# Patient Record
Sex: FEMALE | Race: WHITE | NOT HISPANIC OR LATINO | Employment: FULL TIME | URBAN - METROPOLITAN AREA
[De-identification: names, ages, dates, MRNs, and addresses within clinical notes are randomized per-mention and may not be internally consistent; named-entity substitution may affect disease eponyms.]

---

## 2017-01-06 ENCOUNTER — ROUTINE PRENATAL (OUTPATIENT)
Dept: OBSTETRICS AND GYNECOLOGY | Facility: CLINIC | Age: 27
End: 2017-01-06
Attending: OBSTETRICS & GYNECOLOGY
Payer: COMMERCIAL

## 2017-01-06 ENCOUNTER — LAB VISIT (OUTPATIENT)
Dept: LAB | Facility: OTHER | Age: 27
End: 2017-01-06
Attending: OBSTETRICS & GYNECOLOGY
Payer: COMMERCIAL

## 2017-01-06 VITALS
WEIGHT: 132.38 LBS | BODY MASS INDEX: 23.45 KG/M2 | DIASTOLIC BLOOD PRESSURE: 76 MMHG | SYSTOLIC BLOOD PRESSURE: 118 MMHG

## 2017-01-06 DIAGNOSIS — Z34.03 ENCOUNTER FOR SUPERVISION OF NORMAL FIRST PREGNANCY IN THIRD TRIMESTER: Primary | ICD-10-CM

## 2017-01-06 DIAGNOSIS — Z34.02 ENCOUNTER FOR SUPERVISION OF NORMAL FIRST PREGNANCY IN SECOND TRIMESTER: ICD-10-CM

## 2017-01-06 LAB
BASOPHILS # BLD AUTO: 0.02 K/UL
BASOPHILS NFR BLD: 0.2 %
DIFFERENTIAL METHOD: ABNORMAL
EOSINOPHIL # BLD AUTO: 0.1 K/UL
EOSINOPHIL NFR BLD: 0.5 %
ERYTHROCYTE [DISTWIDTH] IN BLOOD BY AUTOMATED COUNT: 13.3 %
HCT VFR BLD AUTO: 32.7 %
HGB BLD-MCNC: 11.2 G/DL
LYMPHOCYTES # BLD AUTO: 2.3 K/UL
LYMPHOCYTES NFR BLD: 20.9 %
MCH RBC QN AUTO: 31.5 PG
MCHC RBC AUTO-ENTMCNC: 34.3 %
MCV RBC AUTO: 92 FL
MONOCYTES # BLD AUTO: 0.9 K/UL
MONOCYTES NFR BLD: 7.8 %
NEUTROPHILS # BLD AUTO: 7.8 K/UL
NEUTROPHILS NFR BLD: 70.6 %
PLATELET # BLD AUTO: 213 K/UL
PMV BLD AUTO: 9.9 FL
RBC # BLD AUTO: 3.55 M/UL
WBC # BLD AUTO: 11.1 K/UL

## 2017-01-06 PROCEDURE — 99999 PR PBB SHADOW E&M-EST. PATIENT-LVL I: CPT | Mod: PBBFAC,,, | Performed by: OBSTETRICS & GYNECOLOGY

## 2017-01-06 PROCEDURE — 85025 COMPLETE CBC W/AUTO DIFF WBC: CPT

## 2017-01-06 PROCEDURE — 0502F SUBSEQUENT PRENATAL CARE: CPT | Mod: S$GLB,,, | Performed by: OBSTETRICS & GYNECOLOGY

## 2017-01-09 ENCOUNTER — PATIENT MESSAGE (OUTPATIENT)
Dept: OBSTETRICS AND GYNECOLOGY | Facility: CLINIC | Age: 27
End: 2017-01-09

## 2017-01-16 ENCOUNTER — TELEPHONE (OUTPATIENT)
Dept: OBSTETRICS AND GYNECOLOGY | Facility: CLINIC | Age: 27
End: 2017-01-16

## 2017-01-16 NOTE — TELEPHONE ENCOUNTER
Let pt know i cannot scan into her chart but I can email/fax or give it to her at her appt on Friday. Pt prefers email.

## 2017-01-16 NOTE — TELEPHONE ENCOUNTER
Pt would like to know if she can get an order for a breast pump scanned onto her my [facility] portal.

## 2017-01-20 ENCOUNTER — ROUTINE PRENATAL (OUTPATIENT)
Dept: OBSTETRICS AND GYNECOLOGY | Facility: CLINIC | Age: 27
End: 2017-01-20
Attending: OBSTETRICS & GYNECOLOGY
Payer: COMMERCIAL

## 2017-01-20 VITALS
SYSTOLIC BLOOD PRESSURE: 110 MMHG | BODY MASS INDEX: 24.21 KG/M2 | DIASTOLIC BLOOD PRESSURE: 70 MMHG | WEIGHT: 136.69 LBS

## 2017-01-20 DIAGNOSIS — Z34.03 ENCOUNTER FOR SUPERVISION OF NORMAL FIRST PREGNANCY IN THIRD TRIMESTER: ICD-10-CM

## 2017-01-20 DIAGNOSIS — Z23 NEED FOR TDAP VACCINATION: Primary | ICD-10-CM

## 2017-01-20 PROCEDURE — 90471 IMMUNIZATION ADMIN: CPT | Mod: S$GLB,,, | Performed by: OBSTETRICS & GYNECOLOGY

## 2017-01-20 PROCEDURE — 0502F SUBSEQUENT PRENATAL CARE: CPT | Mod: S$GLB,,, | Performed by: OBSTETRICS & GYNECOLOGY

## 2017-01-20 PROCEDURE — 90715 TDAP VACCINE 7 YRS/> IM: CPT | Mod: S$GLB,,, | Performed by: OBSTETRICS & GYNECOLOGY

## 2017-01-20 PROCEDURE — 99999 PR PBB SHADOW E&M-EST. PATIENT-LVL II: CPT | Mod: PBBFAC,,, | Performed by: OBSTETRICS & GYNECOLOGY

## 2017-01-20 NOTE — PROGRESS NOTES
Ordering Physician: Dr. Sky  Order Type: Verbal     During visit today patient received injection of Tdap to left deltoid. Patient tolerated well, no allergic reaction noted. Requested patient to remain 10 minutes after injection.     Pre-Pain Scale:None     Post Pain Scale:None

## 2017-01-20 NOTE — LETTER
January 20, 2017    Earlene Nassar  [address]  [place] [address]         [facility]  [address]  [address] [place] [address]  Phone: [phone]  Fax: [phone] January 20, 2017     Patient: Earlene Nassar   YOB: 1990   Date of Visit: 1/20/2017       To Whom It May Concern:    Please allow my patient, Earlene Nassar, to wear tennis shoes at work to prevent swelling during the remainder of her pregnancy.    If you have any questions or concerns, please don't hesitate to call.    Sincerely,        Martin Sky MD

## 2017-01-20 NOTE — MR AVS SNAPSHOT
[facility]  [address]  [address]  [facility] [address]  Phone: [phone]  Fax: [phone]                  Earlene Nassar   2017 2:45 PM   Routine Prenatal    Description:  Female : 1990   Provider:  Martin Sky MD   Department:  [facility] [place]           Reason for Visit     Routine Prenatal Visit           Diagnoses this Visit        Comments    Need for Tdap vaccination    -  Primary     Encounter for supervision of normal first pregnancy in third trimester                To Do List           Future Appointments        Provider Department Dept Phone    2017 3:30 PM NURSE SCHEDULE, [facility] [phone]    2/3/2017 3:15 PM Martin Syk MD [facility] [phone]    2017 3:15 PM Martin Sky MD [facility] [phone]      Goals (5 Years of Data)     None      Follow-Up and Disposition     Return in about 2 weeks (around 2/3/2017).      [facility]     [facility] Line -  Assistance  Registered nurses in the [facility] provide clinical advisement, health education, appointment booking, and other advisory services.  Call for this free service at [phone].             Medications           Message regarding Medications     Verify the changes and/or additions to your medication regime listed below are the same as discussed with your clinician today.  If any of these changes or additions are incorrect, please notify your healthcare provider.             Verify that the below list of medications is an accurate representation of the medications you are currently taking.  If none reported, the list may be blank. If incorrect, please contact your healthcare provider. Carry this list with you in case of emergency.           Current Medications     doxylamine-pyridoxine (DICLEGIS) 10-10 mg TbEC 1 tab PO q nightly for nausea. Add 1 tab PO in am in nausea  persists. Add additional 1 tab PO in afternoon if needed    prenatal #108-iron,carbonyl-FA 30-1 mg Tab Take by mouth once daily.           Clinical Reference Information           Prenatal Vitals     Enc. Date GA Prenatal Vitals Prenatal Pulse Pain Level Urine Albumin/Glucose Edema Presentation Dilation/Effacement/Station    1/20/17 31w0d 110/70 / 62 kg (136 lb 11 oz) 31 cm / 148 / Present   Negative / Negative None / None / None      1/6/17 29w0d 118/76 / 60.1 kg (132 lb 6.2 oz) 28 cm / 133 / Present   Negative / Negative None / None / None      12/2/16 24w0d 110/68 / 56.1 kg (123 lb 10.9 oz) 24 cm / 132 / Present   Negative / Negative None / None / None      11/4/16 20w0d 112/70 / 53.4 kg (117 lb 9.9 oz) 20 cm / 148 / Present   Negative / Negative None / None / None      10/6/16 15w6d 104/62 / 51 kg (112 lb 5.2 oz)  / 156 / Absent   Negative / Negative None / None / None      9/6/16 11w4d 116/78  /  / Absent   Negative / Negative None / None / None      8/19/16 7w6d 116/76 / 47.8 kg (105 lb 6.1 oz)             Vital Signs - Last Recorded  Most recent update: 1/20/2017  2:57 PM by Dee Heredia MA    BP Wt LMP BMI       110/70 62 kg (136 lb 11 oz) 06/17/2016 (Exact Date) 24.21 kg/m2       Allergies as of 1/20/2017     Codeine      Immunizations Administered on Date of Encounter - 1/20/2017     Name Date Dose VIS Date Route    TDAP  Incomplete 0.5 mL 2/24/2015 Intramuscular      Orders Placed During Today's Visit      Normal Orders This Visit    Tdap Vaccine (Adult)

## 2017-02-03 ENCOUNTER — ROUTINE PRENATAL (OUTPATIENT)
Dept: OBSTETRICS AND GYNECOLOGY | Facility: CLINIC | Age: 27
End: 2017-02-03
Attending: OBSTETRICS & GYNECOLOGY
Payer: COMMERCIAL

## 2017-02-03 VITALS
DIASTOLIC BLOOD PRESSURE: 76 MMHG | WEIGHT: 142.94 LBS | BODY MASS INDEX: 25.33 KG/M2 | SYSTOLIC BLOOD PRESSURE: 110 MMHG

## 2017-02-03 DIAGNOSIS — Z34.03 ENCOUNTER FOR SUPERVISION OF NORMAL FIRST PREGNANCY IN THIRD TRIMESTER: ICD-10-CM

## 2017-02-03 DIAGNOSIS — O47.03 THREATENED PREMATURE LABOR, THIRD TRIMESTER: Primary | ICD-10-CM

## 2017-02-03 PROCEDURE — 0502F SUBSEQUENT PRENATAL CARE: CPT | Mod: S$GLB,,, | Performed by: OBSTETRICS & GYNECOLOGY

## 2017-02-03 PROCEDURE — 99999 PR PBB SHADOW E&M-EST. PATIENT-LVL II: CPT | Mod: PBBFAC,,, | Performed by: OBSTETRICS & GYNECOLOGY

## 2017-02-03 PROCEDURE — 87086 URINE CULTURE/COLONY COUNT: CPT

## 2017-02-03 NOTE — LETTER
February 3, 2017    Earlene Nassar  [address]  [place] [address]         [facility]  [address]  [address] [place] [address]  Phone: [phone]  Fax: [phone] February 3, 2017     Patient: Earlene Nassar   YOB: 1990   Date of Visit: 2/3/2017       To Whom It May Concern:    It is my medical opinion that Earlene Nassar is unable to travel for work at this time due to complications in pregnancy. If you have any questions or concerns, please don't hesitate to call.    Sincerely,        Martin Sky MD

## 2017-02-03 NOTE — MR AVS SNAPSHOT
[facility]  [address]  [address]  [facility] [address]  Phone: [phone]  Fax: [phone]                  Earlene Nassar   2/3/2017 2:45 PM   Routine Prenatal    Description:  Female : 1990   Provider:  Martin Sky MD   Department:  [facility]           Reason for Visit     Routine Prenatal Visit           Diagnoses this Visit        Comments    Threatened premature labor, third trimester    -  Primary     Encounter for supervision of normal first pregnancy in third trimester                To Do List           Future Appointments        Provider Department Dept Phone    2017 1:00 PM Yas Pruitt NP [facility] [phone]    2017 3:15 PM Martin Sky MD [facility] [phone]    2017 9:15 AM Martin Sky MD [facility] [phone]    3/3/2017 3:45 PM Meli Fernandes MD [facility] [phone]      Goals (5 Years of Data)     None      Follow-Up and Disposition     Return in about 1 week (around 2/10/2017).    Follow-up and Disposition History      [facility]     [facility] On Call Nurse Care Line -  Assistance  Registered nurses in the [facility] On Call Center provide clinical advisement, health education, appointment booking, and other advisory services.  Call for this free service at [phone].             Medications           Message regarding Medications     Verify the changes and/or additions to your medication regime listed below are the same as discussed with your clinician today.  If any of these changes or additions are incorrect, please notify your healthcare provider.             Verify that the below list of medications is an accurate representation of the medications you are currently taking.  If none reported, the list may be blank. If incorrect, please contact your healthcare provider. Carry this list with you in case of emergency.           Current  Medications     doxylamine-pyridoxine (DICLEGIS) 10-10 mg TbEC 1 tab PO q nightly for nausea. Add 1 tab PO in am in nausea persists. Add additional 1 tab PO in afternoon if needed    prenatal #108-iron,carbonyl-FA 30-1 mg Tab Take by mouth once daily.           Clinical Reference Information           Prenatal Vitals     Enc. Date GA Prenatal Vitals Prenatal Pulse Pain Level Urine Albumin/Glucose Edema Presentation Dilation/Effacement/Station    2/3/17 33w0d 110/76 / 64.8 kg (142 lb 15.5 oz) 33 cm / 142 / Present   Negative / Negative None / None / None      1/20/17 31w0d 110/70 / 62 kg (136 lb 11 oz) 31 cm / 148 / Present   Negative / Negative None / None / None      1/6/17 29w0d 118/76 / 60.1 kg (132 lb 6.2 oz) 28 cm / 133 / Present   Negative / Negative None / None / None      12/2/16 24w0d 110/68 / 56.1 kg (123 lb 10.9 oz) 24 cm / 132 / Present   Negative / Negative None / None / None      11/4/16 20w0d 112/70 / 53.4 kg (117 lb 9.9 oz) 20 cm / 148 / Present   Negative / Negative None / None / None      10/6/16 15w6d 104/62 / 51 kg (112 lb 5.2 oz)  / 156 / Absent   Negative / Negative None / None / None      9/6/16 11w4d 116/78  /  / Absent   Negative / Negative None / None / None      8/19/16 7w6d 116/76 / 47.8 kg (105 lb 6.1 oz)             Your Vitals Were     BP Weight Last Period BMI       110/76 64.8 kg (142 lb 15.5 oz) 06/17/2016 (Exact Date) 25.33 kg/m2       Allergies as of 2/3/2017     Codeine      Immunizations Administered on Date of Encounter - 2/3/2017     None      Orders Placed During Today's Visit      Normal Orders This Visit    Urine culture       Language Assistance Services     ATTENTION: Language assistance services are available, free of charge. Please call [phone].      ATENCIÓN: Si habla orsarioañol, tiene a peres disposición servicios gratuitos de asistencia lingüística. Llame al [phone].     RAMA Ý: N?u b?n nói Ti?ng Vi?t, có các d?ch v? h? tr? ngôn ng? mi?n phí dành cho b?n. G?i  s? [phone].         [facility] complies with applicable Federal civil rights laws and does not discriminate on the basis of race, color, national origin, age, disability, or sex.

## 2017-02-03 NOTE — PROGRESS NOTES
Having upper back pain, worse with sitting better with standing. Drives on hour back and forth to work which does not help; neg CVAT recommend ortho consult and PT.  SVE 1-2/70/-2.  Recommend stopping traveling to [facility] for work,  Okay to work in [employer] at this time.  Come back early next week for repeat SVE and FFN and BMZ injection.

## 2017-02-03 NOTE — LETTER
February 3, 2017    Earlene Nassar  [address]  [place] [address]         [facility]  [address]  [address] [place] [address]  Phone: [phone]  Fax: [phone] February 3, 2017     Patient: Earlene Nassar   YOB: 1990   Date of Visit: 2/3/2017       To Whom It May Concern:    It is my medical opinion that Earlene Nassar is unable to travel to the [place] office for work at this time due to complications in pregnancy. If you have any questions or concerns, please don't hesitate to call.    Sincerely,        Martin Sky MD

## 2017-02-07 ENCOUNTER — ROUTINE PRENATAL (OUTPATIENT)
Dept: OBSTETRICS AND GYNECOLOGY | Facility: CLINIC | Age: 27
End: 2017-02-07
Payer: COMMERCIAL

## 2017-02-07 VITALS
BODY MASS INDEX: 25.03 KG/M2 | WEIGHT: 141.31 LBS | DIASTOLIC BLOOD PRESSURE: 82 MMHG | SYSTOLIC BLOOD PRESSURE: 120 MMHG

## 2017-02-07 DIAGNOSIS — Z3A.33 33 WEEKS GESTATION OF PREGNANCY: ICD-10-CM

## 2017-02-07 DIAGNOSIS — Z34.00 GRAVIDA 1, CURRENTLY PREGNANT: ICD-10-CM

## 2017-02-07 DIAGNOSIS — O26.893 BACK PAIN DURING PREGNANCY IN THIRD TRIMESTER: Primary | ICD-10-CM

## 2017-02-07 DIAGNOSIS — M54.9 BACK PAIN DURING PREGNANCY IN THIRD TRIMESTER: Primary | ICD-10-CM

## 2017-02-07 DIAGNOSIS — O47.03 THREATENED PRETERM LABOR, THIRD TRIMESTER: ICD-10-CM

## 2017-02-07 LAB — BACTERIA UR CULT: NO GROWTH

## 2017-02-07 PROCEDURE — 99999 PR PBB SHADOW E&M-EST. PATIENT-LVL II: CPT | Mod: PBBFAC,,, | Performed by: NURSE PRACTITIONER

## 2017-02-07 PROCEDURE — 82731 ASSAY OF FETAL FIBRONECTIN: CPT

## 2017-02-07 PROCEDURE — 0502F SUBSEQUENT PRENATAL CARE: CPT | Mod: S$GLB,,, | Performed by: NURSE PRACTITIONER

## 2017-02-07 NOTE — PROGRESS NOTES
Chief Complaint:  Obstetric Problem Visit    HPI: Earlene Nassar is a 26 y.o., , at 33w4d wks here for FU visit and FFN. At her last visit she reported bilateral upper back discomfort that was worse with sitting and relieved with standing. She was traveling long distances in the car at this time as well. She reports the upper back discomfort is more musculoskeletal related and not associated with contractions and she denies contractions, vaginal bleeding, leaking fluids, abnormal vaginal discharge,  or GI complaints. Fetal movement detected at this time.     Physical Exam:   FHT: 148  FH: 33  Cervix: 1    Plan:  1. Upper back pain and dilation-- exam with no change since previous visit. FFN today and pending results steroid injection. Discussed/Precautions given. Pt is Rh Positive. UA in office neg. No other reported symptoms, keep fu apt with dr. Sky and also discussed spotting can happen after cervical exam

## 2017-02-07 NOTE — PATIENT INSTRUCTIONS
FETAL KICK COUNTS  You are the best monitor for how well your baby is doing. The American Congress of Obstetricians and Gynecologists (ACOG) recommends that you time how long it takes you to feel 10 kicks, flutters, swishes, or rolls. Ideally, you want to feel at least 10 movements within 2 hours. You will likely feel 10 movements in less time than that. Please start counting your babys movements twice a day using the following guidelines:  Fetal Kick Counts:  1. Count in the morning to make sure baby moves 5 times within one hour  2. Count in the evening to make sure baby moves 5 times within on hour  NOTE: count movements of any kind: kicks, clutters, swishes, rolls  If at any time you feel the baby is not moving as much as he/she usually does please follow the below:  1. Have something to eat/drink  2. Lie down on your left side in a quiet room with you hand on uterus  3. Count the movements your baby makes in the next hour  4. If you are not able to feel 10 movements in the hour notify provider immediately  As baby grows and you get closer to your due date, the movements will change. With less room inside of your uterus your baby becomes less vigorous. Continue to monitor movements as outlined above.  Do Not compare your babys movements to other pregnant women or previous pregnancies. Each baby has an individual pattern of activity and development. R  Remember you know your jesus better than anyone else. If there is sudden changes in the movement of your baby, notify provider immediately.

## 2017-02-08 ENCOUNTER — TELEPHONE (OUTPATIENT)
Dept: OBSTETRICS AND GYNECOLOGY | Facility: CLINIC | Age: 27
End: 2017-02-08

## 2017-02-08 ENCOUNTER — PATIENT MESSAGE (OUTPATIENT)
Dept: OBSTETRICS AND GYNECOLOGY | Facility: CLINIC | Age: 27
End: 2017-02-08

## 2017-02-08 ENCOUNTER — CLINICAL SUPPORT (OUTPATIENT)
Dept: OBSTETRICS AND GYNECOLOGY | Facility: CLINIC | Age: 27
End: 2017-02-08
Payer: COMMERCIAL

## 2017-02-08 DIAGNOSIS — R87.89 POSITIVE FETAL FIBRONECTIN AT 22 WEEKS TO 34 WEEKS GESTATION: Primary | ICD-10-CM

## 2017-02-08 DIAGNOSIS — O09.899 POSITIVE FETAL FIBRONECTIN AT 22 WEEKS TO 34 WEEKS GESTATION: Primary | ICD-10-CM

## 2017-02-08 DIAGNOSIS — O47.03 THREATENED PRETERM LABOR, THIRD TRIMESTER: Primary | ICD-10-CM

## 2017-02-08 LAB — FIBRONECTIN FETAL SPEC QL: POSITIVE

## 2017-02-08 PROCEDURE — 96372 THER/PROPH/DIAG INJ SC/IM: CPT | Mod: S$GLB,,, | Performed by: NURSE PRACTITIONER

## 2017-02-08 PROCEDURE — 99999 PR PBB SHADOW E&M-EST. PATIENT-LVL I: CPT | Mod: PBBFAC,,,

## 2017-02-08 RX ORDER — BETAMETHASONE SODIUM PHOSPHATE AND BETAMETHASONE ACETATE 3; 3 MG/ML; MG/ML
12 INJECTION, SUSPENSION INTRA-ARTICULAR; INTRALESIONAL; INTRAMUSCULAR; SOFT TISSUE DAILY
Qty: 4 ML | Refills: 0 | Status: SHIPPED | OUTPATIENT
Start: 2017-02-08 | End: 2017-02-10

## 2017-02-08 RX ORDER — BETAMETHASONE SODIUM PHOSPHATE AND BETAMETHASONE ACETATE 3; 3 MG/ML; MG/ML
12 INJECTION, SUSPENSION INTRA-ARTICULAR; INTRALESIONAL; INTRAMUSCULAR; SOFT TISSUE EVERY 24 HOURS
Status: COMPLETED | OUTPATIENT
Start: 2017-02-08 | End: 2017-02-09

## 2017-02-08 RX ADMIN — BETAMETHASONE SODIUM PHOSPHATE AND BETAMETHASONE ACETATE 12 MG: 3; 3 INJECTION, SUSPENSION INTRA-ARTICULAR; INTRALESIONAL; INTRAMUSCULAR; SOFT TISSUE at 03:02

## 2017-02-08 NOTE — TELEPHONE ENCOUNTER
Discussed with Dr. Sky and patient about positive FFN. Education provided of reliability/predictability of FFN, precautions given and pelvic rest discussed. Pt to work in [place] office and start steroid. Orders entered and pt to come today for steroid injection

## 2017-02-08 NOTE — PROGRESS NOTES
Ordering Physician: Asya    Order Type: Verbal     During visit today patient received injection of celestone to right ventrogluteal. Patient tolerated well, no allergic reaction noted. Requested patient to remain 10 minutes after injection.     Pre-Pain Scale:None     Post Pain Scale:None

## 2017-02-09 ENCOUNTER — CLINICAL SUPPORT (OUTPATIENT)
Dept: OBSTETRICS AND GYNECOLOGY | Facility: CLINIC | Age: 27
End: 2017-02-09
Payer: COMMERCIAL

## 2017-02-09 DIAGNOSIS — O47.03 THREATENED PRETERM LABOR, THIRD TRIMESTER: Primary | ICD-10-CM

## 2017-02-09 PROCEDURE — 96372 THER/PROPH/DIAG INJ SC/IM: CPT | Mod: S$GLB,,, | Performed by: OBSTETRICS & GYNECOLOGY

## 2017-02-09 RX ADMIN — BETAMETHASONE SODIUM PHOSPHATE AND BETAMETHASONE ACETATE 12 MG: 3; 3 INJECTION, SUSPENSION INTRA-ARTICULAR; INTRALESIONAL; INTRAMUSCULAR; SOFT TISSUE at 03:02

## 2017-02-09 NOTE — PROGRESS NOTES
Ordering Physician: Dr. Sky    Order Type: Verbal     During visit today patient received injection of Betamethasone to left glut. Patient tolerated well, no allergic reaction noted. Requested patient to remain 10 minutes after injection.     Pre-Pain Scale:None     Post Pain Scale:None

## 2017-02-13 ENCOUNTER — TELEPHONE (OUTPATIENT)
Dept: OBSTETRICS AND GYNECOLOGY | Facility: CLINIC | Age: 27
End: 2017-02-13

## 2017-02-13 ENCOUNTER — ROUTINE PRENATAL (OUTPATIENT)
Dept: OBSTETRICS AND GYNECOLOGY | Facility: CLINIC | Age: 27
End: 2017-02-13
Payer: COMMERCIAL

## 2017-02-13 VITALS
BODY MASS INDEX: 24.86 KG/M2 | WEIGHT: 140.31 LBS | DIASTOLIC BLOOD PRESSURE: 90 MMHG | SYSTOLIC BLOOD PRESSURE: 120 MMHG

## 2017-02-13 DIAGNOSIS — R87.89 POSITIVE FETAL FIBRONECTIN AT 22 WEEKS TO 34 WEEKS GESTATION: ICD-10-CM

## 2017-02-13 DIAGNOSIS — Z34.00 GRAVIDA 1, CURRENTLY PREGNANT: ICD-10-CM

## 2017-02-13 DIAGNOSIS — Z3A.34 34 WEEKS GESTATION OF PREGNANCY: ICD-10-CM

## 2017-02-13 DIAGNOSIS — O28.8 NON-REACTIVE NST (NON-STRESS TEST): Primary | ICD-10-CM

## 2017-02-13 DIAGNOSIS — O09.899 POSITIVE FETAL FIBRONECTIN AT 22 WEEKS TO 34 WEEKS GESTATION: ICD-10-CM

## 2017-02-13 PROCEDURE — 99999 PR PBB SHADOW E&M-EST. PATIENT-LVL II: CPT | Mod: PBBFAC,,, | Performed by: NURSE PRACTITIONER

## 2017-02-13 PROCEDURE — 59025 FETAL NON-STRESS TEST: CPT | Mod: S$GLB,,, | Performed by: NURSE PRACTITIONER

## 2017-02-13 PROCEDURE — 0502F SUBSEQUENT PRENATAL CARE: CPT | Mod: S$GLB,,, | Performed by: NURSE PRACTITIONER

## 2017-02-13 NOTE — PROGRESS NOTES
Chief Complaint  NST Monitoring    HPI:  Pt is a 26 y.o.  at 34w3d gestation here for NST monitoring. Patient is currently getting NSTs for cramping. Was seen in office about 1.5 weeks ago with back pain and was dilated 1 at that visit. Seen on FU with reports of MS back pain, no changes in dilation, but FFN was positive. Since that time she has completed her steroids and she reports starting with pelivc pressure low in the pelvis last night around 8/9 oclock and feels she will have this pressure every hour. Denies VB/leaking fluids/GI/ complaints. She also reports she isn't sure if this is fetal movement in the pelvis. She doesn't describe as painful but mor of an hourly pressure that resolves in about 30 seconds.    Procdedure:  Indication: Pelvic pressure/cramping/+FFN  Baseline: 135  Variability: Moderate  Accelerations: present  Decelerations: absent  Tocometry: no contractions   Cervix: SVE with no changes in cervix since last visit with cervix 1.5 cm  Manual 118/76  Assessment:  1. NST finding: reactive. Reassurance provided today, case discussed with Dr. Mejia. Hydration and discussed real/vs false labor contracetion. Keep apt with Dr. Sky on Friday

## 2017-02-13 NOTE — TELEPHONE ENCOUNTER
34 3/7 week OB c/o cramping every hour and pressure since last night.  She spoke with the after hours nurse but wanted to come in to be checked.  Reassured her that it was normal, instructed her to increase PO hydration and rest.  Labor precautions given.  Scheduled with Yas today.

## 2017-02-17 ENCOUNTER — ROUTINE PRENATAL (OUTPATIENT)
Dept: OBSTETRICS AND GYNECOLOGY | Facility: CLINIC | Age: 27
End: 2017-02-17
Attending: OBSTETRICS & GYNECOLOGY
Payer: COMMERCIAL

## 2017-02-17 VITALS
BODY MASS INDEX: 25.19 KG/M2 | SYSTOLIC BLOOD PRESSURE: 118 MMHG | WEIGHT: 142.19 LBS | DIASTOLIC BLOOD PRESSURE: 80 MMHG

## 2017-02-17 DIAGNOSIS — Z36.85 ANTENATAL SCREENING FOR STREPTOCOCCUS B: Primary | ICD-10-CM

## 2017-02-17 DIAGNOSIS — Z34.03 ENCOUNTER FOR SUPERVISION OF NORMAL FIRST PREGNANCY IN THIRD TRIMESTER: ICD-10-CM

## 2017-02-17 PROCEDURE — 87081 CULTURE SCREEN ONLY: CPT

## 2017-02-17 PROCEDURE — 0502F SUBSEQUENT PRENATAL CARE: CPT | Mod: S$GLB,,, | Performed by: OBSTETRICS & GYNECOLOGY

## 2017-02-17 PROCEDURE — 99999 PR PBB SHADOW E&M-EST. PATIENT-LVL I: CPT | Mod: PBBFAC,,, | Performed by: OBSTETRICS & GYNECOLOGY

## 2017-02-17 NOTE — MR AVS SNAPSHOT
[facility]  [address]  [address]  [facility] [address]  Phone: [phone]  Fax: [phone]                  Earlene Nassar   2017 3:15 PM   Routine Prenatal    Description:  Female : 1990   Provider:  Martin Sky MD   Department:  [facility]           Diagnoses this Visit        Comments     screening for streptococcus B    -  Primary     Encounter for supervision of normal first pregnancy in third trimester                To Do List           Future Appointments        Provider Department Dept Phone    2017 9:15 AM Martin Sky MD [facility] [phone]    3/3/2017 3:45 PM Meli Fernandes MD [facility] [phone]    3/10/2017 3:30 PM Martin Sky MD [facility] [phone]      Goals (5 Years of Data)     None      Follow-Up and Disposition     Return in about 1 week (around 2017).    Follow-up and Disposition History      [facility]     [facility] -  Assistance  Registered nurses in the [facility] On Call Center provide clinical advisement, health education, appointment booking, and other advisory services.  Call for this free service at [phone].             Medications           Message regarding Medications     Verify the changes and/or additions to your medication regime listed below are the same as discussed with your clinician today.  If any of these changes or additions are incorrect, please notify your healthcare provider.        STOP taking these medications     doxylamine-pyridoxine (DICLEGIS) 10-10 mg TbEC 1 tab PO q nightly for nausea. Add 1 tab PO in am in nausea persists. Add additional 1 tab PO in afternoon if needed           Verify that the below list of medications is an accurate representation of the medications you are currently taking.  If none reported, the list may be blank. If incorrect, please contact your healthcare provider.  Carry this list with you in case of emergency.           Current Medications     prenatal #108-iron,carbonyl-FA 30-1 mg Tab Take by mouth once daily.           Clinical Reference Information           Prenatal Vitals     Enc. Date GA Prenatal Vitals Prenatal Pulse Pain Level Urine Albumin/Glucose Edema Presentation Dilation/Effacement/Station    2/17/17 35w0d 118/80 / 64.5 kg (142 lb 3.2 oz) 34 cm / 142 / Present   Negative / Negative None / None / None Vertex 4 / 90 / -2    2/13/17 34w3d 120/90 (A) / 63.6 kg (140 lb 5.2 oz)  /  / Present   Negative / Negative None / None / None  1    2/7/17 33w4d 120/82 / 64.1 kg (141 lb 5 oz) 33 cm / 148 / Present   Negative / Negative None / None / None  1    2/3/17 33w0d 110/76 / 64.8 kg (142 lb 15.5 oz) 33 cm / 142 / Present   Negative / Negative None / None / None      1/20/17 31w0d 110/70 / 62 kg (136 lb 11 oz) 31 cm / 148 / Present   Negative / Negative None / None / None      1/6/17 29w0d 118/76 / 60.1 kg (132 lb 6.2 oz) 28 cm / 133 / Present   Negative / Negative None / None / None      12/2/16 24w0d 110/68 / 56.1 kg (123 lb 10.9 oz) 24 cm / 132 / Present   Negative / Negative None / None / None      11/4/16 20w0d 112/70 / 53.4 kg (117 lb 9.9 oz) 20 cm / 148 / Present   Negative / Negative None / None / None      10/6/16 15w6d 104/62 / 51 kg (112 lb 5.2 oz)  / 156 / Absent   Negative / Negative None / None / None      9/6/16 11w4d 116/78  /  / Absent   Negative / Negative None / None / None      8/19/16 7w6d 116/76 / 47.8 kg (105 lb 6.1 oz)             Your Vitals Were     BP Weight Last Period BMI       118/80 64.5 kg (142 lb 3.2 oz) 06/17/2016 (Exact Date) 25.19 kg/m2       Allergies as of 2/17/2017     Codeine      Immunizations Administered on Date of Encounter - 2/17/2017     None      Orders Placed During Today's Visit      Normal Orders This Visit    Strep B Screen, Vaginal / Rectal       Language Assistance Services     ATTENTION: Language assistance services are  available, free of charge. Please call [phone].      ATENCIÓN: Si habla lizet, tiene a peres disposición servicios gratuitos de asistencia lingüística. Llame al [phone].     CHÚ Ý: N?u b?n nói Ti?ng Vi?t, có các d?ch v? h? tr? ngôn ng? mi?n phí dành cho b?n. G?i s? [phone].         [facility] complies with applicable Federal civil rights laws and does not discriminate on the basis of race, color, national origin, age, disability, or sex.

## 2017-02-18 ENCOUNTER — HOSPITAL ENCOUNTER (EMERGENCY)
Facility: OTHER | Age: 27
Discharge: HOME | End: 2017-02-18
Attending: OBSTETRICS & GYNECOLOGY
Payer: COMMERCIAL

## 2017-02-18 DIAGNOSIS — O47.03 THREATENED PRETERM LABOR, THIRD TRIMESTER: ICD-10-CM

## 2017-02-18 DIAGNOSIS — O26.893 PREGNANCY RELATED LOW BACK PAIN IN THIRD TRIMESTER, ANTEPARTUM: ICD-10-CM

## 2017-02-18 DIAGNOSIS — O09.519 PREGNANCY, HIGH-RISK, MATERNAL AGE 35+ PRIMIGRAVIDA: Primary | ICD-10-CM

## 2017-02-18 DIAGNOSIS — M54.50 PREGNANCY RELATED LOW BACK PAIN IN THIRD TRIMESTER, ANTEPARTUM: ICD-10-CM

## 2017-02-18 LAB
BILIRUB SERPL-MCNC: NORMAL MG/DL
BLOOD URINE, POC: NORMAL
COLOR, POC UA: NORMAL
GLUCOSE UR QL STRIP: NORMAL
KETONES UR QL STRIP: NORMAL
LEUKOCYTE ESTERASE URINE, POC: NORMAL
NITRITE, POC UA: NORMAL
PH, POC UA: 6
PROTEIN, POC: NORMAL
SPECIFIC GRAVITY, POC UA: 1
UROBILINOGEN, POC UA: NORMAL

## 2017-02-18 PROCEDURE — 59025 FETAL NON-STRESS TEST: CPT

## 2017-02-18 PROCEDURE — 99284 EMERGENCY DEPT VISIT MOD MDM: CPT | Mod: 25

## 2017-02-18 PROCEDURE — 59025 FETAL NON-STRESS TEST: CPT | Mod: 26,,, | Performed by: OBSTETRICS & GYNECOLOGY

## 2017-02-18 PROCEDURE — 99282 EMERGENCY DEPT VISIT SF MDM: CPT | Mod: 25,,, | Performed by: OBSTETRICS & GYNECOLOGY

## 2017-02-18 NOTE — ED AVS SNAPSHOT
[facility]  [address]  [facility] [address]               Earlene Nassar   2017 10:58 PM   ED    Description:  Female : 1990   Department:  [facility]           Your Care was Coordinated By:     Provider Role From To    Olinda Beckwith MD Attending Provider 17 [id number] --      Reason for Visit     Contractions           Diagnoses this Visit        Comments    Pregnancy, high-risk, maternal age 35+ primigravida    -  Primary     Pregnancy related low back pain in third trimester, antepartum         Threatened  labor, third trimester           ED Disposition     ED Disposition Condition Comment    Discharge             To Do List           Follow-up Information     Follow up with Martin Sky MD In 5 days.    Specialties:  Obstetrics and Gynecology, Obstetrics, Gynecology    Contact information:    [address]  [address]  [facility] [address]  [phone]        [facility] On Call     [facility] On Call Nurse Care Line -  Assistance  Registered nurses in the [facility] provide clinical advisement, health education, appointment booking, and other advisory services.  Call for this free service at [phone].             Medications           Message regarding Medications     Verify the changes and/or additions to your medication regime listed below are the same as discussed with your clinician today.  If any of these changes or additions are incorrect, please notify your healthcare provider.             Verify that the below list of medications is an accurate representation of the medications you are currently taking.  If none reported, the list may be blank. If incorrect, please contact your healthcare provider. Carry this list with you in case of emergency.           Current Medications     prenatal #108-iron,carbonyl-FA 30-1 mg Tab Take by mouth once daily.           Clinical Reference Information            Your Vitals Were     Last Period                   06/17/2016 (Exact Date)           Allergies as of 2/18/2017        Reactions    Codeine     Per pt's mother she had rash as a child but took narcotics after Tonsillectomy without problems      Immunizations Administered on Date of Encounter - 2/18/2017     None      ED Micro, Lab, POCT     Start Ordered       Status Ordering Provider    02/18/17 0104 02/18/17 230  POCT urinalysis, dipstick or tablet reag  Once      Final result       ED Imaging Orders     None        Discharge Instructions       Contact your primary OB or after hours at [phone] if you experience any of the following:    Contractions every 7-10 minutes for 1 or more hours.   A sudden gush or constant leaking of fluid.  Heavy vaginal bleeding.   If you experience a constant headache, blurry vision, pain underneath your right rib, or sudden swelling of your hands, feet, and face.   If you are 28 weeks pregnant or greater, you can measure kick counts with a goal of 10 or more movements within 2 hours.     Remember to stay hydrated; drink 8-10 bottles of water a day.     Maintain all follow-up appointments.     Discharge References/Attachments     BACK PAIN (LOW): SELF-CARE (ENGLISH)    BACK PAIN DURING PREGNANCY, RELIEVING: TAILOR SIT, TRUNK TURN (ENGLISH)    BACK PAIN DURING PREGNANCY, RELIEVING: WALL STRETCH, BODY BEND (ENGLISH)      Your Scheduled Appointments     Feb 22, 2017  9:15 AM CST   Routine Prenatal Visit with Martin Sky MD   [facility] ([facility]    [address]  [address]  [facility] [id number] [phone]            Mar 03, 2017  3:45 PM CST   Routine Prenatal Visit with Meli Fernandes MD   [facility] ([facility])    [address]  [address]  [facility] [address] [phone]            Mar 10, 2017  3:30 PM CST   Routine Prenatal Visit with Mratin Sky MD   [facility]  ([facility])    [address]  [address]  [facility] [address]   [phone]               [facility] complies with applicable Federal civil rights laws and does not discriminate on the basis of race, color, national origin, age, disability, or sex.        Language Assistance Services     ATTENTION: Language assistance services are available, free of charge. Please call [phone].      ATENCIÓN: Si habla español, tiene a peres disposición servicios gratuitos de asistencia lingüística. Llame al [phone].     CHÚ Ý: N?u b?n nói Ti?ng Vi?t, có các d?ch v? h? tr? ngôn ng? mi?n phí dành cho b?n. G?i s? [phone].

## 2017-02-19 NOTE — ED PROVIDER NOTES
Encounter Date: 2/18/2017       History     Chief Complaint   Patient presents with    Contractions     Every 20 minutes, irregular. All day long.      Review of patient's allergies indicates:   Allergen Reactions    Codeine      Per pt's mother she had rash as a child but took narcotics after Tonsillectomy without problems     HPI Comments: 25 yo G1 @ 35 1/7 wga presents for evaluation of low back pain x 1 day. Patient also notes she has been feeling uterine contractions every 20 minutes off and on. She was seen in the office yesterday and noted to be dilated 4 cm. The back pain is constant but has occasional waves of sharper pain. The pain does not radiate.      2 weeks ago, she had a + FFN and was dilated 1-2 cm and received a course betamethasone.      The history is provided by the patient.     History reviewed. No pertinent past medical history.  No past medical history pertinent negatives.  Past Surgical History   Procedure Laterality Date    Tonsillectomy      Adenoidectomy       Family History   Problem Relation Age of Onset    No Known Problems Mother     Heart disease Father     No Known Problems Maternal Grandmother     No Known Problems Maternal Grandfather     No Known Problems Paternal Grandmother     No Known Problems Paternal Grandfather     Breast cancer Neg Hx     Colon cancer Neg Hx     Ovarian cancer Neg Hx      Social History   Substance Use Topics    Smoking status: Never Smoker    Smokeless tobacco: Never Used    Alcohol use No     Review of Systems   Constitutional: Negative.    Respiratory: Negative.    Gastrointestinal: Negative for abdominal pain, diarrhea, nausea and vomiting.   Genitourinary: Negative for vaginal bleeding, vaginal discharge and vaginal pain.   Musculoskeletal: Positive for back pain. Negative for gait problem.   Neurological: Negative.        Physical Exam   Initial Vitals   BP Pulse Resp Temp SpO2   -- -- -- -- --          BP: 123/84 Pulse: 90s RR:  14  Visit Vitals    LMP 2016 (Exact Date)       Physical Exam    Nursing note and vitals reviewed.  Constitutional: She appears well-developed and well-nourished.   HENT:   Head: Normocephalic and atraumatic.   Nose: Nose normal.   Cardiovascular: Normal rate and regular rhythm.   Pulmonary/Chest: Breath sounds normal.   Abdominal: Soft. There is no tenderness.   gravid   Musculoskeletal: She exhibits no edema or tenderness.   Neurological: She is alert and oriented to person, place, and time.   Skin: Skin is warm and dry.   Psychiatric: She has a normal mood and affect. Her behavior is normal. Judgment and thought content normal.     OB LABOR EXAM:   Pre-Term Labor: No.   Membranes ruptured: No.   Method: Sterile vaginal exam per MD.   Vaginal Bleeding: none present.     Dilatation: 4.   Station: -2.   Amniotic Fluid Color: no fluid.     Comments: / soft, posterior       ED Course   Procedures    FETAL ASSESSMENT REPORT    RE: Earlene Nassar  MRN:  [id number]  :  1990  AGE:  26 y.o.    Date:  2017    Allergies: Flavio Henao is a 26 y.o.  at 35w1d gestational age here today for a NST.    3/24/2017, by Last Menstrual Period    MEDICATIONS AT TIME OF TEST:  No current facility-administered medications for this encounter.      Current Outpatient Prescriptions   Medication Sig Dispense Refill    prenatal #108-iron,carbonyl-FA 30-1 mg Tab Take by mouth once daily.         Indication: rule out uterine contractions    Interpretation:  130 BPM baseline    Variability:  Good {> 6 bpm)    Accelerations:  Reactive    Decelerations:  none  Tocodynometry: Acontractile  Assessment: reactive    Plan:  NST reactive      Olinda Beckwith MD  2017; 11:33 PM          Labs Reviewed   POCT URINALYSIS, DIPSTICK OR TABLET REAGENT, AUTOMATED, WITH MICROSCOP - Normal             Medical Decision Making:   History:   Old Medical Records: I decided to obtain old medical records.  Old  Records Summarized: records from clinic visits.       <> Summary of Records: Prenatal record reviewed.  Initial Assessment:   Low back pain in pregnancy  Advanced cervical dilation  acontractile  Clinical Tests:   Medical Tests: Ordered and Reviewed  ED Management:  Patient evaluated; very comfortable, mostly concerned as she was so dilated yesterday and is not sure if low back pain could be contractions.  It does not appear to be today.      PPROM & PTL precautions reviewed. Pelvic rest advised.     Patient ok to go home. Fetal status reassuring.                    ED Course     Clinical Impression:   The primary encounter diagnosis was Pregnancy, high-risk, maternal age 35+ primigravida. Diagnoses of Pregnancy related low back pain in third trimester, antepartum and Threatened  labor, third trimester were also pertinent to this visit.          Olinda Beckwith MD  17 [id number]

## 2017-02-19 NOTE — DISCHARGE INSTRUCTIONS
Contact your primary OB or after hours at [phone] if you experience any of the following:    Contractions every 7-10 minutes for 1 or more hours.   A sudden gush or constant leaking of fluid.  Heavy vaginal bleeding.   If you experience a constant headache, blurry vision, pain underneath your right rib, or sudden swelling of your hands, feet, and face.   If you are 28 weeks pregnant or greater, you can measure kick counts with a goal of 10 or more movements within 2 hours.     Remember to stay hydrated; drink 8-10 bottles of water a day.     Maintain all follow-up appointments.

## 2017-02-20 LAB — BACTERIA SPEC AEROBE CULT: NORMAL

## 2017-02-22 ENCOUNTER — ROUTINE PRENATAL (OUTPATIENT)
Dept: OBSTETRICS AND GYNECOLOGY | Facility: CLINIC | Age: 27
End: 2017-02-22
Attending: OBSTETRICS & GYNECOLOGY
Payer: COMMERCIAL

## 2017-02-22 VITALS
DIASTOLIC BLOOD PRESSURE: 82 MMHG | BODY MASS INDEX: 25.64 KG/M2 | SYSTOLIC BLOOD PRESSURE: 120 MMHG | WEIGHT: 144.75 LBS

## 2017-02-22 DIAGNOSIS — Z34.03 ENCOUNTER FOR SUPERVISION OF NORMAL FIRST PREGNANCY IN THIRD TRIMESTER: Primary | ICD-10-CM

## 2017-02-22 PROCEDURE — 0502F SUBSEQUENT PRENATAL CARE: CPT | Mod: S$GLB,,, | Performed by: OBSTETRICS & GYNECOLOGY

## 2017-02-22 PROCEDURE — 99999 PR PBB SHADOW E&M-EST. PATIENT-LVL I: CPT | Mod: PBBFAC,,, | Performed by: OBSTETRICS & GYNECOLOGY

## 2017-02-22 NOTE — MR AVS SNAPSHOT
[facility]  [address]  [address]  [facility] [address]  Phone: [phone]  Fax: [phone]                  Earlene Nassar   2017 9:15 AM   Routine Prenatal    Description:  Female : 1990   Provider:  Martin Sky MD   Department[facility]  [facility]           Diagnoses this Visit        Comments    Encounter for supervision of normal first pregnancy in third trimester    -  Primary            To Do List           Future Appointments        Provider Department Dept Phone    3/3/2017 3:45 PM Meli Fernandes MD [facility] [phone]    3/10/2017 3:30 PM Martin Sky MD [facility] [phone]      Goals (5 Years of Data)     None      [facility]     [facility] Care Line - / Assistance  Registered nurses in the [facility] provide clinical advisement, health education, appointment booking, and other advisory services.  Call for this free service at [phone].             Medications           Message regarding Medications     Verify the changes and/or additions to your medication regime listed below are the same as discussed with your clinician today.  If any of these changes or additions are incorrect, please notify your healthcare provider.             Verify that the below list of medications is an accurate representation of the medications you are currently taking.  If none reported, the list may be blank. If incorrect, please contact your healthcare provider. Carry this list with you in case of emergency.           Current Medications     prenatal #108-iron,carbonyl-FA 30-1 mg Tab Take by mouth once daily.           Clinical Reference Information           Prenatal Vitals     Enc. Date GA Prenatal Vitals Prenatal Pulse Pain Level Urine Albumin/Glucose Edema Presentation Dilation/Effacement/Station    17 35w5d 120/82 / 65.6 kg (144 lb 11.7 oz) 36 cm / 152 / Present   Negative / Negative  None / None / None Vertex 4 / 90 / -2    2/18/17 35w1d Admission Dept: [facility] OB ER    2/17/17 35w0d 118/80 / 64.5 kg (142 lb 3.2 oz) 34 cm / 142 / Present   Negative / Negative None / None / None Vertex 4 / 90 / -2    2/13/17 34w3d 120/90 (A) / 63.6 kg (140 lb 5.2 oz)  /  / Present   Negative / Negative None / None / None  1    2/7/17 33w4d 120/82 / 64.1 kg (141 lb 5 oz) 33 cm / 148 / Present   Negative / Negative None / None / None  1    2/3/17 33w0d 110/76 / 64.8 kg (142 lb 15.5 oz) 33 cm / 142 / Present   Negative / Negative None / None / None      1/20/17 31w0d 110/70 / 62 kg (136 lb 11 oz) 31 cm / 148 / Present   Negative / Negative None / None / None      1/6/17 29w0d 118/76 / 60.1 kg (132 lb 6.2 oz) 28 cm / 133 / Present   Negative / Negative None / None / None      12/2/16 24w0d 110/68 / 56.1 kg (123 lb 10.9 oz) 24 cm / 132 / Present   Negative / Negative None / None / None      11/4/16 20w0d 112/70 / 53.4 kg (117 lb 9.9 oz) 20 cm / 148 / Present   Negative / Negative None / None / None      10/6/16 15w6d 104/62 / 51 kg (112 lb 5.2 oz)  / 156 / Absent   Negative / Negative None / None / None      9/6/16 11w4d 116/78  /  / Absent   Negative / Negative None / None / None      8/19/16 7w6d 116/76 / 47.8 kg (105 lb 6.1 oz)             Your Vitals Were     BP Weight Last Period BMI       120/82 65.6 kg (144 lb 11.7 oz) 06/17/2016 (Exact Date) 25.64 kg/m2       Allergies as of 2/22/2017     Codeine      Immunizations Administered on Date of Encounter - 2/22/2017     None      Language Assistance Services     ATTENTION: Language assistance services are available, free of charge. Please call [phone].      ATENCIÓN: Si habla español, tiene a peres disposición servicios gratuitos de asistencia lingüística. Llame al [phone].     CHÚ Ý: N?u b?n nói Ti?ng Vi?t, có các d?ch v? h? tr? ngôn ng? mi?n phí dành cho b?n. G?i s? [phone].         [facility] complies with applicable Federal civil  rights laws and does not discriminate on the basis of race, color, national origin, age, disability, or sex.

## 2017-03-03 ENCOUNTER — ROUTINE PRENATAL (OUTPATIENT)
Dept: OBSTETRICS AND GYNECOLOGY | Facility: CLINIC | Age: 27
End: 2017-03-03
Payer: COMMERCIAL

## 2017-03-03 VITALS
DIASTOLIC BLOOD PRESSURE: 82 MMHG | WEIGHT: 147.81 LBS | BODY MASS INDEX: 26.18 KG/M2 | SYSTOLIC BLOOD PRESSURE: 114 MMHG

## 2017-03-03 DIAGNOSIS — Z34.03 ENCOUNTER FOR SUPERVISION OF NORMAL FIRST PREGNANCY IN THIRD TRIMESTER: Primary | ICD-10-CM

## 2017-03-03 DIAGNOSIS — Z3A.37 37 WEEKS GESTATION OF PREGNANCY: ICD-10-CM

## 2017-03-03 PROCEDURE — 0502F SUBSEQUENT PRENATAL CARE: CPT | Mod: S$GLB,,, | Performed by: OBSTETRICS & GYNECOLOGY

## 2017-03-03 PROCEDURE — 99999 PR PBB SHADOW E&M-EST. PATIENT-LVL II: CPT | Mod: PBBFAC,,, | Performed by: OBSTETRICS & GYNECOLOGY

## 2017-03-03 NOTE — MR AVS SNAPSHOT
[facility]  [address]  [address]  [facility] [address]  Phone: [phone]  Fax: [phone]                  Earlene Nassar   3/3/2017 3:45 PM   Routine Prenatal    Description:  Female : 1990   Provider:  Meli Fernandes MD   Department:  [facility]           Reason for Visit     Routine Prenatal Visit                To Do List           Future Appointments        Provider Department Dept Phone    3/10/2017 3:30 PM Martin Sky MD [facility] [phone]      Goals (5 Years of Data)     None      [facility]     [facility] On Call Nurse [facility] Line -  Assistance  Registered nurses in the [facility] On Call Center provide clinical advisement, health education, appointment booking, and other advisory services.  Call for this free service at [phone].             Medications           Message regarding Medications     Verify the changes and/or additions to your medication regime listed below are the same as discussed with your clinician today.  If any of these changes or additions are incorrect, please notify your healthcare provider.             Verify that the below list of medications is an accurate representation of the medications you are currently taking.  If none reported, the list may be blank. If incorrect, please contact your healthcare provider. Carry this list with you in case of emergency.           Current Medications     prenatal #108-iron,carbonyl-FA 30-1 mg Tab Take by mouth once daily.           Clinical Reference Information           Prenatal Vitals     Enc. Date GA Prenatal Vitals Prenatal Pulse Pain Level Urine Albumin/Glucose Edema Presentation Dilation/Effacement/Station    3/3/17 37w0d 114/82 / 67 kg (147 lb 13.1 oz)    Negative / Negative       17 35w5d 120/82 / 65.6 kg (144 lb 11.7 oz) 36 cm / 152 / Present   Negative / Negative None / None / None Vertex  / -2    17 35w1d Admission Dept: [facility]  OB ER    2/17/17 35w0d 118/80 / 64.5 kg (142 lb 3.2 oz) 34 cm / 142 / Present   Negative / Negative None / None / None Vertex 4 / 90 / -2    2/13/17 34w3d 120/90 (A) / 63.6 kg (140 lb 5.2 oz)  /  / Present   Negative / Negative None / None / None  1    2/7/17 33w4d 120/82 / 64.1 kg (141 lb 5 oz) 33 cm / 148 / Present   Negative / Negative None / None / None  1    2/3/17 33w0d 110/76 / 64.8 kg (142 lb 15.5 oz) 33 cm / 142 / Present   Negative / Negative None / None / None      1/20/17 31w0d 110/70 / 62 kg (136 lb 11 oz) 31 cm / 148 / Present   Negative / Negative None / None / None      1/6/17 29w0d 118/76 / 60.1 kg (132 lb 6.2 oz) 28 cm / 133 / Present   Negative / Negative None / None / None      12/2/16 24w0d 110/68 / 56.1 kg (123 lb 10.9 oz) 24 cm / 132 / Present   Negative / Negative None / None / None      11/4/16 20w0d 112/70 / 53.4 kg (117 lb 9.9 oz) 20 cm / 148 / Present   Negative / Negative None / None / None      10/6/16 15w6d 104/62 / 51 kg (112 lb 5.2 oz)  / 156 / Absent   Negative / Negative None / None / None      9/6/16 11w4d 116/78  /  / Absent   Negative / Negative None / None / None      8/19/16 7w6d 116/76 / 47.8 kg (105 lb 6.1 oz)             Your Vitals Were     BP Weight Last Period BMI       114/82 67 kg (147 lb 13.1 oz) 06/17/2016 (Exact Date) 26.18 kg/m2       Allergies as of 3/3/2017     Codeine      Immunizations Administered on Date of Encounter - 3/3/2017     None      Language Assistance Services     ATTENTION: Language assistance services are available, free of charge. Please call [phone].      ATENCIÓN: Si habla español, tiene a peres disposición servicios gratuitos de asistencia lingüística. Lexis fitzpatrick [phone].     RAMA Ý: N?u b?n nói Ti?ng Vi?t, có các d?ch v? h? tr? ngôn ng? mi?n phí dành cho b?n. G?i s? [phone].         [facility] complies with applicable Federal civil rights laws and does not discriminate on the basis of race, color, national origin,  age, disability, or sex.

## 2017-03-09 ENCOUNTER — ROUTINE PRENATAL (OUTPATIENT)
Dept: OBSTETRICS AND GYNECOLOGY | Facility: CLINIC | Age: 27
End: 2017-03-09
Attending: OBSTETRICS & GYNECOLOGY
Payer: COMMERCIAL

## 2017-03-09 ENCOUNTER — HOSPITAL ENCOUNTER (OUTPATIENT)
Dept: PERINATAL CARE | Facility: OTHER | Age: 27
Discharge: HOME | End: 2017-03-09
Attending: OBSTETRICS & GYNECOLOGY
Payer: COMMERCIAL

## 2017-03-09 VITALS — WEIGHT: 149.5 LBS | DIASTOLIC BLOOD PRESSURE: 84 MMHG | BODY MASS INDEX: 26.48 KG/M2 | SYSTOLIC BLOOD PRESSURE: 122 MMHG

## 2017-03-09 DIAGNOSIS — O36.8130 DECREASED FETAL MOVEMENT, THIRD TRIMESTER, NOT APPLICABLE OR UNSPECIFIED FETUS: ICD-10-CM

## 2017-03-09 DIAGNOSIS — Z34.90 PREGNANCY: ICD-10-CM

## 2017-03-09 DIAGNOSIS — Z3A.39 39 WEEKS GESTATION OF PREGNANCY: Primary | ICD-10-CM

## 2017-03-09 DIAGNOSIS — O16.3 HYPERTENSION AFFECTING PREGNANCY IN THIRD TRIMESTER: ICD-10-CM

## 2017-03-09 PROCEDURE — 76815 OB US LIMITED FETUS(S): CPT | Mod: 26,,, | Performed by: OBSTETRICS & GYNECOLOGY

## 2017-03-09 PROCEDURE — 99999 PR PBB SHADOW E&M-EST. PATIENT-LVL II: CPT | Mod: PBBFAC,,, | Performed by: OBSTETRICS & GYNECOLOGY

## 2017-03-09 PROCEDURE — 59025 FETAL NON-STRESS TEST: CPT | Mod: 26,59,, | Performed by: OBSTETRICS & GYNECOLOGY

## 2017-03-09 PROCEDURE — 76815 OB US LIMITED FETUS(S): CPT

## 2017-03-09 PROCEDURE — 59025 FETAL NON-STRESS TEST: CPT

## 2017-03-09 PROCEDURE — 0502F SUBSEQUENT PRENATAL CARE: CPT | Mod: S$GLB,,, | Performed by: OBSTETRICS & GYNECOLOGY

## 2017-03-09 RX ORDER — METHYLERGONOVINE MALEATE 0.2 MG/ML
200 INJECTION INTRAVENOUS
Status: CANCELLED | OUTPATIENT
Start: 2017-03-09

## 2017-03-09 RX ORDER — OXYTOCIN/RINGER'S LACTATE 20/1000 ML
41.65 PLASTIC BAG, INJECTION (ML) INTRAVENOUS CONTINUOUS
Status: CANCELLED | OUTPATIENT
Start: 2017-03-09 | End: 2017-03-10

## 2017-03-09 RX ORDER — ONDANSETRON 4 MG/1
8 TABLET, ORALLY DISINTEGRATING ORAL EVERY 8 HOURS PRN
Status: CANCELLED | OUTPATIENT
Start: 2017-03-09

## 2017-03-09 RX ORDER — OXYCODONE AND ACETAMINOPHEN 10; 325 MG/1; MG/1
1 TABLET ORAL EVERY 4 HOURS PRN
Status: CANCELLED | OUTPATIENT
Start: 2017-03-09

## 2017-03-09 RX ORDER — CARBOPROST TROMETHAMINE 250 UG/ML
250 INJECTION, SOLUTION INTRAMUSCULAR
Status: CANCELLED | OUTPATIENT
Start: 2017-03-09

## 2017-03-09 RX ORDER — OXYCODONE AND ACETAMINOPHEN 5; 325 MG/1; MG/1
1 TABLET ORAL EVERY 4 HOURS PRN
Status: CANCELLED | OUTPATIENT
Start: 2017-03-09

## 2017-03-09 RX ORDER — MISOPROSTOL 100 UG/1
200 TABLET ORAL
Status: CANCELLED | OUTPATIENT
Start: 2017-03-09

## 2017-03-09 RX ORDER — OXYTOCIN/RINGER'S LACTATE 20/1000 ML
20 PLASTIC BAG, INJECTION (ML) INTRAVENOUS ONCE
Status: CANCELLED | OUTPATIENT
Start: 2017-03-09 | End: 2017-03-09

## 2017-03-09 NOTE — MR AVS SNAPSHOT
[facility]  [address]  [address]  [facility] [address]  Phone: [phone]  Fax: [phone]                  Earlene Nassar   3/9/2017 2:15 PM   Routine Prenatal    Description:  Female : 1990   Provider:  Martin Sky MD   Department:  [facility]           Reason for Visit     Routine Prenatal Visit           Diagnoses this Visit        Comments    Decreased fetal movement, third trimester, not applicable or unspecified fetus    -  Primary            To Do List           Goals (5 Years of Data)     None      [facility]     [facility] Nurse Care Line -  Assistance  Registered nurses in the [facility] provide clinical advisement, health education, appointment booking, and other advisory services.  Call for this free service at [phone].             Medications           Message regarding Medications     Verify the changes and/or additions to your medication regime listed below are the same as discussed with your clinician today.  If any of these changes or additions are incorrect, please notify your healthcare provider.             Verify that the below list of medications is an accurate representation of the medications you are currently taking.  If none reported, the list may be blank. If incorrect, please contact your healthcare provider. Carry this list with you in case of emergency.           Current Medications     prenatal #108-iron,carbonyl-FA 30-1 mg Tab Take by mouth once daily.           Clinical Reference Information           Prenatal Vitals     Enc. Date GA Prenatal Vitals Prenatal Pulse Pain Level Urine Albumin/Glucose Edema Presentation Dilation/Effacement/Station    3/9/17 37w6d 122/84 / 67.8 kg (149 lb 7.9 oz) 37 cm / 145 / Present  0 Negative / Negative None / None / None Vertex 5 / 90 / -2    3/3/17 37w0d 114/82 / 67 kg (147 lb 13.1 oz) 37 cm / 145 / Present   Negative / Negative None / None / None Vertex 4.5  / 90 / -2    2/22/17 35w5d 120/82 / 65.6 kg (144 lb 11.7 oz) 36 cm / 152 / Present   Negative / Negative None / None / None Vertex 4 / 90 / -2    2/18/17 35w1d Admission Dept: [facility] OB ER    2/17/17 35w0d 118/80 / 64.5 kg (142 lb 3.2 oz) 34 cm / 142 / Present   Negative / Negative None / None / None Vertex 4 / 90 / -2    2/13/17 34w3d 120/90 (A) / 63.6 kg (140 lb 5.2 oz)  /  / Present   Negative / Negative None / None / None  1    2/7/17 33w4d 120/82 / 64.1 kg (141 lb 5 oz) 33 cm / 148 / Present   Negative / Negative None / None / None  1    2/3/17 33w0d 110/76 / 64.8 kg (142 lb 15.5 oz) 33 cm / 142 / Present   Negative / Negative None / None / None      1/20/17 31w0d 110/70 / 62 kg (136 lb 11 oz) 31 cm / 148 / Present   Negative / Negative None / None / None      1/6/17 29w0d 118/76 / 60.1 kg (132 lb 6.2 oz) 28 cm / 133 / Present   Negative / Negative None / None / None      12/2/16 24w0d 110/68 / 56.1 kg (123 lb 10.9 oz) 24 cm / 132 / Present   Negative / Negative None / None / None      11/4/16 20w0d 112/70 / 53.4 kg (117 lb 9.9 oz) 20 cm / 148 / Present   Negative / Negative None / None / None      10/6/16 15w6d 104/62 / 51 kg (112 lb 5.2 oz)  / 156 / Absent   Negative / Negative None / None / None      9/6/16 11w4d 116/78  /  / Absent   Negative / Negative None / None / None      8/19/16 7w6d 116/76 / 47.8 kg (105 lb 6.1 oz)             Your Vitals Were     BP Weight Last Period BMI       122/84 67.8 kg (149 lb 7.9 oz) 06/17/2016 (Exact Date) 26.48 kg/m2       Allergies as of 3/9/2017     Codeine      Immunizations Administered on Date of Encounter - 3/9/2017     None      Orders Placed During Today's Visit     Future Labs/Procedures Expected by Expires    Prenatal Testing-Future (1 visit)  As directed 3/9/2018    US OB/GYN Procedure (Viewpoint) - Extended List - Future  As directed 3/9/2018      Language Assistance Services     ATTENTION: Language assistance services are available, free of charge. Please call  [phone].      ATENCIÓN: Si habla español, tiene a peres disposición servicios gratuitos de asistencia lingüística. Llame al [phone].     CHÚ Ý: N?u b?n nói Ti?ng Vi?t, có các d?ch v? h? tr? ngôn ng? mi?n phí dành cho b?n. G?i s? [phone].         [facility] complies with applicable Federal civil rights laws and does not discriminate on the basis of race, color, national origin, age, disability, or sex.

## 2017-03-09 NOTE — PROGRESS NOTES
Reports fetal movement but not as much.  Ultrasound backed up.  To PN testing for NST/HEIDY if all is well then wants induction next week at 39 wks.

## 2017-03-09 NOTE — H&P
[facility]  History & Physical  Obstetrics      SUBJECTIVE:     Chief Complaint/Reason for Admission: induction of labor    History of Present Illness:  Earlene Nassar is a 26 y.o.  female with an Estimated Date of Delivery: 3/24/17 admitted for induction of labor.  Her current obstetrical history is significant for advanced cervical dilation s/p BMZ at 34 wks.          (Not in a hospital admission)    Review of patient's allergies indicates:   Allergen Reactions    Codeine      Per pt's mother she had rash as a child but took narcotics after Tonsillectomy without problems        History reviewed. No pertinent past medical history.  Past Surgical History:   Procedure Laterality Date    ADENOIDECTOMY      TONSILLECTOMY       Family History   Problem Relation Age of Onset    No Known Problems Mother     Heart disease Father     No Known Problems Maternal Grandmother     No Known Problems Maternal Grandfather     No Known Problems Paternal Grandmother     No Known Problems Paternal Grandfather     Breast cancer Neg Hx     Colon cancer Neg Hx     Ovarian cancer Neg Hx      Social History   Substance Use Topics    Smoking status: Never Smoker    Smokeless tobacco: Never Used    Alcohol use No       Review of Systems  Constitutional: no fever or chills  Eyes: no visual changes  Respiratory: no cough or shortness of breath  Cardiovascular: no chest pain or palpitations  Gastrointestinal: no nausea or vomiting, tolerating diet  Genitourinary: no hematuria or dysuria     OBJECTIVE:     Vital Signs (Most Recent):  BP: 122/84 (17 1429)    Physical Exam:  General:  alert and normal appearing gravid female   Skin:  Skin color, texture, turgor normal. No rashes or lesions   HEENT:  conjunctivae/corneas clear. PERRL.   Lungs:  clear to auscultation bilaterally   Heart:  regular rate and rhythm, S1, S2 normal, no murmur, click, rub or gallop   Breasts:     Abdomen:  soft, non-tender; bowel  sounds normal   Uterine Size:     Presentations:  cephalic   FHT:  Reactive   Pelvis:    Cervix:     Dilation: 5    Effacement: 90    Station:  -2    Consistency: soft    Position: anterior     Laboratory:  Lab Results   Component Value Date    GROUPTRH A POS 2016    HEPBSAG Negative 2016        Diagnostic Results:      ASSESSMENT/PLAN:     39 gestation.     Conditions: N/A     Risks, benefits, alternatives and possible complications have been discussed in detail with the patient.  Pre-admission, admission, and post admission procedures and expectations were discussed in detail.  All questions answered, all appropriate consents will be signed at the Hospital. Admit start pitocin and AROM once patient is yumiko.

## 2017-03-10 ENCOUNTER — TELEPHONE (OUTPATIENT)
Dept: OBSTETRICS AND GYNECOLOGY | Facility: CLINIC | Age: 27
End: 2017-03-10

## 2017-03-10 NOTE — TELEPHONE ENCOUNTER
----- Message from Martin Sky MD sent at 3/10/2017  8:39 AM CST -----  Call patient her labs from yesterday are normal I will see her next week at her induction.  Please schedule her 6 wk pp visit today.

## 2017-03-15 ENCOUNTER — TELEPHONE (OUTPATIENT)
Dept: OBSTETRICS AND GYNECOLOGY | Facility: CLINIC | Age: 27
End: 2017-03-15

## 2017-03-15 ENCOUNTER — ROUTINE PRENATAL (OUTPATIENT)
Dept: OBSTETRICS AND GYNECOLOGY | Facility: CLINIC | Age: 27
End: 2017-03-15
Payer: COMMERCIAL

## 2017-03-15 VITALS
DIASTOLIC BLOOD PRESSURE: 78 MMHG | WEIGHT: 150.69 LBS | SYSTOLIC BLOOD PRESSURE: 118 MMHG | BODY MASS INDEX: 26.69 KG/M2

## 2017-03-15 DIAGNOSIS — O26.893 VAGINAL DISCHARGE DURING PREGNANCY IN THIRD TRIMESTER: Primary | ICD-10-CM

## 2017-03-15 DIAGNOSIS — N89.8 VAGINAL DISCHARGE DURING PREGNANCY IN THIRD TRIMESTER: Primary | ICD-10-CM

## 2017-03-15 LAB
CANDIDA RRNA VAG QL PROBE: NEGATIVE
G VAGINALIS RRNA GENITAL QL PROBE: NEGATIVE
T VAGINALIS RRNA GENITAL QL PROBE: NEGATIVE

## 2017-03-15 PROCEDURE — 99999 PR PBB SHADOW E&M-EST. PATIENT-LVL III: CPT | Mod: PBBFAC,,, | Performed by: NURSE PRACTITIONER

## 2017-03-15 PROCEDURE — 0502F SUBSEQUENT PRENATAL CARE: CPT | Mod: S$GLB,,, | Performed by: NURSE PRACTITIONER

## 2017-03-15 NOTE — PATIENT INSTRUCTIONS
Topic  General Pregnancy Information Recommended   (Unless Otherwise Contraindicated Or Restrictions Given To You By Your OB Doctor)      1. Anticipated course of prenatal care       Visits: will be Every 4 wks until 28 weeks, then every 2 weeks until 36 weeks, and then weekly until delivery.    Urine will be collected at each Obstetric visit        2. Nutrition and weight gain     Daily pre-igor vitamin (recommend taking at night)    Additional 300 calories needed daily   No Sushi, hotdogs, unpasteurized products (milk/cheeses). No large fish such as: shark, marcos mackerel, tile, sword fish    Incorporate 12 ounces of smaller seafood/week and no more than 6oz of albacore tuna    Caffeine: 200 mg/day or 2 cups of caffeine/day    Weight gain recommendations are based off of BMI before pregnancy. Generally patients who with normal weight prior pregnancy it is recommended 25-35 pounds of weight gain during the pregnancy with an estimated weekly gain of 1 pound/wk in 2nd and 3rd Trimester.    4. Sexual Activity   Sexual activity is okay unless you are put on restrictions by your provider. I recommend urinating after intercourse    5. Exercise   Generally pre-pregnancy routine is okay to continue    Drink plenty fluids for hydration    Stop any activity that causes heavy cramping like a period or bright red bleeding and contact your provider   No extreme or contact sports    No exercise on your back for an extended period of time after 20 weeks    6. Hot Tub/Saunas   Avoid hot tubes and saunas    7. Vaccines   Influenza vaccine is recommended by CDC during flu season    Tdap (pertussis or whopping cough) recommended each pregnancy between 27 and 36 weeks    Tdap booster recommended for family and other planned direct caregivers    8. Water   Water is an important nutrient in a good diet. However, it cannot be stressed enough that during pregnancy water is essential. The body has increased circulation  through blood vessels, and without a large increase in water, pregnant women will be dehydrated. It plays an important role in decreasing constipation, preventing  contractions, decreasing swelling, and preventing dizziness. We recommend that you drink 8-10 glasses of water per day.    9. Smoking/Alcohol/Illicit Drug Use   No safe Level    Can lead to problems with pregnancy    Growth of the developing fetus     labor (delivery before 37 weeks)     rupture of the membranes (water breaking before 37 weeks)    Premature separation of the placenta (which may cause bleeding)    American College of Obstetricians and Gynecologists endorses abstinence    Can lead to babies with disabilities    10. Environmental or work hazards   Unless otherwise restricted you may continue work throughout the pregnancy    Notify your provider of any work hazards or chemical exposure concerns   11. Travel     Safe to travel up to 35 weeks    Continue to wear a seatbelt and airbags are still recommended    Drink plenty fluids    Blood clots are a concern during pregnancy with long travel. Recommend compression stockings and moving around at least every 2 hours and staying hydrated.    12. Domestic Violence     Please notify office immediately of any concerns or violence so that we can help direct you to assistance needed    [place] [employer]: [phone]    13. Childbirth classes     List of Childbirth classes from [facility] is available    14. Selecting a Pediatrician   Selecting a pediatrician before delivery is recommended   You can interview pediatricians before delivery    15. Fetal Monitoring     A simple test of your babys well-being is a kick count. After 26 weeks, fetal motion of any kind should be monitored. Further discussion at that time   16.  Labor Signs     Water break, leaking fluids from Vagina prior 37 weeks   Regular contractions,  Contractions that are more than 5-6/hour, getting stronger and painful with lower back pain, does not go away with rest and fluids    17. Postpartum Family Planning     Multiple options available from short term methods to long term reversible and irreversible methods    Discuss with provider as you get closer to delivery    18. Breastfeeding     Classes offered at [facility] and it is recommended to take a class    19. Lifting  In 2013, the National [place] for Occupational Safety and Health (NIOSH) published clinical guidelines for occupational lifting in uncomplicated pregnancies. The recommended weight limits are based on gestational age, intermittent versus repetitive lifting, time (hours/day) spent lifting, and lifting height from floor and distance in 3 front of body. In this guideline, the maximum permissible weight for a woman less than 20 weeks of gestation performing infrequent lifting is 36 pounds (16 kgs) and the maximum permissible weight at ?20 weeks is 26 pounds (12 kgs). For repetitive lifting ?1 hour/day, the maximum weights in the first and second half of pregnancy are 18 pounds (8 kgs) and 13 pounds (6 kgs), respectively, and for repetitive lifting <1 hour/day, the maximum weights are 30 pounds (14 kgs) and 22 pounds (10 kgs), respectively. Although not based on high quality evidence, these guidelines are a reasonable reference for counseling pregnant women     20. Scheduling and Provider Availability     Your Obstetric Doctor is usually here weekly but not every day. We recommend you make 3-4 advanced appointments at a time to accommodate your personal needs and work/school obligations.    We ask that you come 15 minutes prior your scheduled appointment.    For same day appointments (not routine appointments) there is a Nurse Practitioner or another obstetric provider available. Please let the  aware you are an OB patient requesting a same day appointment.

## 2017-03-15 NOTE — MR AVS SNAPSHOT
[facility]  [address]  [place] [address]  Phone: [phone]  Fax: [phone]                  Earlene Nassar   3/15/2017 1:20 PM   Routine Prenatal    Description:  Female : 1990   Provider:  Yas Pruitt NP   Department:  [facility]           Reason for Visit     Vaginal Discharge           Diagnoses this Visit        Comments    Vaginal discharge during pregnancy in third trimester    -  Primary            To Do List           Future Appointments        Provider Department Dept Phone    2017 9:45 AM Martin Sky MD [facility] [phone]      Goals (5 Years of Data)     None      Follow-Up and Disposition     Return for Routine OB.    Follow-up and Disposition History      [facility]     [facility] -  Assistance  Registered nurses in the [facility] provide clinical advisement, health education, appointment booking, and other advisory services.  Call for this free service at [phone].             Medications           Message regarding Medications     Verify the changes and/or additions to your medication regime listed below are the same as discussed with your clinician today.  If any of these changes or additions are incorrect, please notify your healthcare provider.             Verify that the below list of medications is an accurate representation of the medications you are currently taking.  If none reported, the list may be blank. If incorrect, please contact your healthcare provider. Carry this list with you in case of emergency.           Current Medications     prenatal #108-iron,carbonyl-FA 30-1 mg Tab Take by mouth once daily.           Clinical Reference Information           Prenatal Vitals     Enc. Date GA Prenatal Vitals Prenatal Pulse Pain Level Urine Albumin/Glucose Edema Presentation Dilation/Effacement/Station    3/15/17 38w5d 118/78 / 68.3 kg (150 lb 11 oz)   / 150 / Present  0 Negative / Negative None / None / None  5 / 90    3/9/17 37w6d 122/84 / 67.8 kg (149 lb 7.9 oz) 37 cm / 145 / Present  0 Negative / Negative None / None / None Vertex 5 / 90 / -2    3/3/17 37w0d 114/82 / 67 kg (147 lb 13.1 oz) 37 cm / 145 / Present   Negative / Negative None / None / None Vertex 4.5 / 90 / -2    2/22/17 35w5d 120/82 / 65.6 kg (144 lb 11.7 oz) 36 cm / 152 / Present   Negative / Negative None / None / None Vertex 4 / 90 / -2    2/18/17 35w1d Admission Dept: [facility] OB ER    2/17/17 35w0d 118/80 / 64.5 kg (142 lb 3.2 oz) 34 cm / 142 / Present   Negative / Negative None / None / None Vertex 4 / 90 / -2    2/13/17 34w3d 120/90 (A) / 63.6 kg (140 lb 5.2 oz)  /  / Present   Negative / Negative None / None / None  1    2/7/17 33w4d 120/82 / 64.1 kg (141 lb 5 oz) 33 cm / 148 / Present   Negative / Negative None / None / None  1    2/3/17 33w0d 110/76 / 64.8 kg (142 lb 15.5 oz) 33 cm / 142 / Present   Negative / Negative None / None / None      1/20/17 31w0d 110/70 / 62 kg (136 lb 11 oz) 31 cm / 148 / Present   Negative / Negative None / None / None      1/6/17 29w0d 118/76 / 60.1 kg (132 lb 6.2 oz) 28 cm / 133 / Present   Negative / Negative None / None / None      12/2/16 24w0d 110/68 / 56.1 kg (123 lb 10.9 oz) 24 cm / 132 / Present   Negative / Negative None / None / None      11/4/16 20w0d 112/70 / 53.4 kg (117 lb 9.9 oz) 20 cm / 148 / Present   Negative / Negative None / None / None      10/6/16 15w6d 104/62 / 51 kg (112 lb 5.2 oz)  / 156 / Absent   Negative / Negative None / None / None      9/6/16 11w4d 116/78  /  / Absent   Negative / Negative None / None / None      8/19/16 7w6d 116/76 / 47.8 kg (105 lb 6.1 oz)             Your Vitals Were     BP Weight Last Period BMI       118/78 68.3 kg (150 lb 11 oz) 06/17/2016 (Exact Date) 26.69 kg/m2       Allergies as of 3/15/2017     Codeine      Immunizations Administered on Date of Encounter - 3/15/2017     None      Orders Placed During  Today's Visit      Normal Orders This Visit    Vaginosis Screen by DNA Probe       Instructions    Topic  General Pregnancy Information Recommended   (Unless Otherwise Contraindicated Or Restrictions Given To You By Your OB Doctor)      1. Anticipated course of prenatal care       Visits: will be Every 4 wks until 28 weeks, then every 2 weeks until 36 weeks, and then weekly until delivery.    Urine will be collected at each Obstetric visit        2. Nutrition and weight gain     Daily pre- vitamin (recommend taking at night)    Additional 300 calories needed daily   No Sushi, hotdogs, unpasteurized products (milk/cheeses). No large fish such as: shark, marcos mackerel, tile, sword fish    Incorporate 12 ounces of smaller seafood/week and no more than 6oz of albacore tuna    Caffeine: 200 mg/day or 2 cups of caffeine/day    Weight gain recommendations are based off of BMI before pregnancy. Generally patients who with normal weight prior pregnancy it is recommended 25-35 pounds of weight gain during the pregnancy with an estimated weekly gain of 1 pound/wk in 2nd and 3rd Trimester.    4. Sexual Activity   Sexual activity is okay unless you are put on restrictions by your provider. I recommend urinating after intercourse    5. Exercise   Generally pre-pregnancy routine is okay to continue    Drink plenty fluids for hydration    Stop any activity that causes heavy cramping like a period or bright red bleeding and contact your provider   No extreme or contact sports    No exercise on your back for an extended period of time after 20 weeks    6. Hot Tub/Saunas   Avoid hot tubes and saunas    7. Vaccines   Influenza vaccine is recommended by CDC during flu season    Tdap (pertussis or whopping cough) recommended each pregnancy between 27 and 36 weeks    Tdap booster recommended for family and other planned direct caregivers    8. Water   Water is an important nutrient in a good diet. However, it  cannot be stressed enough that during pregnancy water is essential. The body has increased circulation through blood vessels, and without a large increase in water, pregnant women will be dehydrated. It plays an important role in decreasing constipation, preventing  contractions, decreasing swelling, and preventing dizziness. We recommend that you drink 8-10 glasses of water per day.    9. Smoking/Alcohol/Illicit Drug Use   No safe Level    Can lead to problems with pregnancy    Growth of the developing fetus     labor (delivery before 37 weeks)     rupture of the membranes (water breaking before 37 weeks)    Premature separation of the placenta (which may cause bleeding)    American College of Obstetricians and Gynecologists endorses abstinence    Can lead to babies with disabilities    10. Environmental or work hazards   Unless otherwise restricted you may continue work throughout the pregnancy    Notify your provider of any work hazards or chemical exposure concerns   11. Travel     Safe to travel up to 35 weeks    Continue to wear a seatbelt and airbags are still recommended    Drink plenty fluids    Blood clots are a concern during pregnancy with long travel. Recommend compression stockings and moving around at least every 2 hours and staying hydrated.    12. Domestic Violence     Please notify office immediately of any concerns or violence so that we can help direct you to assistance needed    [place] [employer] Domestic [employer]: [phone]    13. Childbirth classes     List of Childbirth classes from [facility] is available    14. Selecting a Pediatrician   Selecting a pediatrician before delivery is recommended   You can interview pediatricians before delivery    15. Fetal Monitoring     A simple test of your babys well-being is a kick count. After 26 weeks, fetal motion of any kind should be monitored. Further discussion at that time   16.  Labor  Signs     Water break, leaking fluids from Vagina prior 37 weeks   Regular contractions, Contractions that are more than 5-6/hour, getting stronger and painful with lower back pain, does not go away with rest and fluids    17. Postpartum Family Planning     Multiple options available from short term methods to long term reversible and irreversible methods    Discuss with provider as you get closer to delivery    18. Breastfeeding     Classes offered at [facility] and it is recommended to take a class    19. Lifting  In 2013, the National [place] for Occupational Safety and Health (NIOSH) published clinical guidelines for occupational lifting in uncomplicated pregnancies. The recommended weight limits are based on gestational age, intermittent versus repetitive lifting, time (hours/day) spent lifting, and lifting height from floor and distance in 3 front of body. In this guideline, the maximum permissible weight for a woman less than 20 weeks of gestation performing infrequent lifting is 36 pounds (16 kgs) and the maximum permissible weight at ?20 weeks is 26 pounds (12 kgs). For repetitive lifting ?1 hour/day, the maximum weights in the first and second half of pregnancy are 18 pounds (8 kgs) and 13 pounds (6 kgs), respectively, and for repetitive lifting <1 hour/day, the maximum weights are 30 pounds (14 kgs) and 22 pounds (10 kgs), respectively. Although not based on high quality evidence, these guidelines are a reasonable reference for counseling pregnant women     20. Scheduling and Provider Availability     Your Obstetric Doctor is usually here weekly but not every day. We recommend you make 3-4 advanced appointments at a time to accommodate your personal needs and work/school obligations.    We ask that you come 15 minutes prior your scheduled appointment.    For same day appointments (not routine appointments) there is a Nurse Practitioner or another obstetric provider available. Please let the   aware you are an OB patient requesting a same day appointment.             Language Assistance Services     ATTENTION: Language assistance services are available, free of charge. Please call [phone].      ATENCIÓN: Si habla lizet, tiene a peres disposición servicios gratuitos de asistencia lingüística. Llame al [phone].     CHÚ Ý: N?u b?n nói Ti?ng Vi?t, có các d?ch v? h? tr? ngôn ng? mi?n phí dành cho b?n. G?i s? [phone].         [facility] complies with applicable Federal civil rights laws and does not discriminate on the basis of race, color, national origin, age, disability, or sex.

## 2017-03-15 NOTE — TELEPHONE ENCOUNTER
[facility], 38 weeks and having foul smelling green discharge, pt being induced on Friday at midnight.

## 2017-03-15 NOTE — PROGRESS NOTES
Chief Complaint:  Obstetric Problem Visit    HPI: Earlene Nassar is a 26 y.o., , at 38w5d wks here reporting having a light green/yellow discharge clump that resolved, now with normal white discharge. She denies any vaginal bleeding, leaking fluids, abnormal vaginal discharge,  or GI complaints. Fetal movement detected at this time. She is scheduled for induction tomorrow night. She was previously 5 cm and 90% effaced    Physical Exam:   FHT: 150  Cervix: 5 CM  Thick white vaginal discharge, no odor  Wt 68.3 kg (150 lb 11 oz)  LMP 2016 (Exact Date)  BMI 26.69 kg/m2  BP: 118/78      Plan:  1. Vaginal Discharge vs mucus plug--affirm ordered. Possible more of mucus plug this morning. Pt continue to be 5cm. She is scheduled for induction tomorrow at midnight. Will call with affirm results    RTC prn and as jun

## 2017-03-15 NOTE — TELEPHONE ENCOUNTER
38 5/7 week OB states she has a green discharge with an odor.  sometimes its watery but also can be a clump of discharge.  She is being induced Thursday night/Friday AM at midnight.  Scheduled with Yas today.

## 2017-03-16 ENCOUNTER — TELEPHONE (OUTPATIENT)
Dept: OBSTETRICS AND GYNECOLOGY | Facility: CLINIC | Age: 27
End: 2017-03-16

## 2017-03-17 ENCOUNTER — ANESTHESIA EVENT (OUTPATIENT)
Dept: OBSTETRICS AND GYNECOLOGY | Facility: OTHER | Age: 27
End: 2017-03-17
Payer: COMMERCIAL

## 2017-03-17 ENCOUNTER — ANESTHESIA (OUTPATIENT)
Dept: OBSTETRICS AND GYNECOLOGY | Facility: OTHER | Age: 27
End: 2017-03-17
Payer: COMMERCIAL

## 2017-03-17 ENCOUNTER — HOSPITAL ENCOUNTER (INPATIENT)
Facility: OTHER | Age: 27
LOS: 2 days | Discharge: HOME | End: 2017-03-19
Attending: OBSTETRICS & GYNECOLOGY | Admitting: OBSTETRICS & GYNECOLOGY
Payer: COMMERCIAL

## 2017-03-17 DIAGNOSIS — Z3A.39 39 WEEKS GESTATION OF PREGNANCY: ICD-10-CM

## 2017-03-17 DIAGNOSIS — Z34.90 PREGNANCY: ICD-10-CM

## 2017-03-17 LAB
ABO + RH BLD: NORMAL
BASOPHILS # BLD AUTO: 0.01 K/UL
BASOPHILS # BLD AUTO: 0.01 K/UL
BASOPHILS NFR BLD: 0.1 %
BASOPHILS NFR BLD: 0.1 %
BLD GP AB SCN CELLS X3 SERPL QL: NORMAL
DIFFERENTIAL METHOD: ABNORMAL
DIFFERENTIAL METHOD: ABNORMAL
EOSINOPHIL # BLD AUTO: 0 K/UL
EOSINOPHIL # BLD AUTO: 0 K/UL
EOSINOPHIL NFR BLD: 0.1 %
EOSINOPHIL NFR BLD: 0.2 %
ERYTHROCYTE [DISTWIDTH] IN BLOOD BY AUTOMATED COUNT: 13.2 %
ERYTHROCYTE [DISTWIDTH] IN BLOOD BY AUTOMATED COUNT: 13.3 %
HCT VFR BLD AUTO: 29.7 %
HCT VFR BLD AUTO: 34.8 %
HGB BLD-MCNC: 10 G/DL
HGB BLD-MCNC: 11.9 G/DL
HIV 1+2 AB+HIV1 P24 AG SERPL QL IA: NEGATIVE
HIV1+2 IGG SERPL QL IA.RAPID: NEGATIVE
LYMPHOCYTES # BLD AUTO: 3.4 K/UL
LYMPHOCYTES # BLD AUTO: 4.3 K/UL
LYMPHOCYTES NFR BLD: 22.8 %
LYMPHOCYTES NFR BLD: 26.1 %
MCH RBC QN AUTO: 31.1 PG
MCH RBC QN AUTO: 31.5 PG
MCHC RBC AUTO-ENTMCNC: 33.7 %
MCHC RBC AUTO-ENTMCNC: 34.2 %
MCV RBC AUTO: 92 FL
MCV RBC AUTO: 92 FL
MONOCYTES # BLD AUTO: 1.7 K/UL
MONOCYTES # BLD AUTO: 2 K/UL
MONOCYTES NFR BLD: 15.2 %
MONOCYTES NFR BLD: 9.2 %
NEUTROPHILS # BLD AUTO: 12.7 K/UL
NEUTROPHILS # BLD AUTO: 7.4 K/UL
NEUTROPHILS NFR BLD: 57.5 %
NEUTROPHILS NFR BLD: 67.4 %
PLATELET # BLD AUTO: 178 K/UL
PLATELET # BLD AUTO: 192 K/UL
PMV BLD AUTO: 10.1 FL
PMV BLD AUTO: 10.5 FL
RBC # BLD AUTO: 3.22 M/UL
RBC # BLD AUTO: 3.78 M/UL
RPR SER QL: NORMAL
WBC # BLD AUTO: 12.88 K/UL
WBC # BLD AUTO: 18.87 K/UL

## 2017-03-17 PROCEDURE — 3E033VJ INTRODUCTION OF OTHER HORMONE INTO PERIPHERAL VEIN, PERCUTANEOUS APPROACH: ICD-10-PCS | Performed by: OBSTETRICS & GYNECOLOGY

## 2017-03-17 PROCEDURE — 59409 OBSTETRICAL CARE: CPT | Mod: QK,,, | Performed by: ANESTHESIOLOGY

## 2017-03-17 PROCEDURE — 27200710 HC EPIDURAL INFUSION PUMP SET: Performed by: ANESTHESIOLOGY

## 2017-03-17 PROCEDURE — 11000001 HC ACUTE MED/SURG PRIVATE ROOM

## 2017-03-17 PROCEDURE — 72100002 HC LABOR CARE, 1ST 8 HOURS

## 2017-03-17 PROCEDURE — 0DQP0ZZ REPAIR RECTUM, OPEN APPROACH: ICD-10-PCS | Performed by: OBSTETRICS & GYNECOLOGY

## 2017-03-17 PROCEDURE — 59400 OBSTETRICAL CARE: CPT | Mod: 22,GB,, | Performed by: OBSTETRICS & GYNECOLOGY

## 2017-03-17 PROCEDURE — 63600175 PHARM REV CODE 636 W HCPCS: Performed by: OBSTETRICS & GYNECOLOGY

## 2017-03-17 PROCEDURE — 25000003 PHARM REV CODE 250

## 2017-03-17 PROCEDURE — 25000003 PHARM REV CODE 250: Performed by: ANESTHESIOLOGY

## 2017-03-17 PROCEDURE — 36415 COLL VENOUS BLD VENIPUNCTURE: CPT

## 2017-03-17 PROCEDURE — 25000003 PHARM REV CODE 250: Performed by: OBSTETRICS & GYNECOLOGY

## 2017-03-17 PROCEDURE — 85025 COMPLETE CBC W/AUTO DIFF WBC: CPT

## 2017-03-17 PROCEDURE — 10907ZC DRAINAGE OF AMNIOTIC FLUID, THERAPEUTIC FROM PRODUCTS OF CONCEPTION, VIA NATURAL OR ARTIFICIAL OPENING: ICD-10-PCS | Performed by: OBSTETRICS & GYNECOLOGY

## 2017-03-17 PROCEDURE — 86850 RBC ANTIBODY SCREEN: CPT

## 2017-03-17 PROCEDURE — 51702 INSERT TEMP BLADDER CATH: CPT

## 2017-03-17 PROCEDURE — 63600175 PHARM REV CODE 636 W HCPCS

## 2017-03-17 PROCEDURE — 27800517 HC TRAY,EPIDURAL-CONTINUOUS: Performed by: ANESTHESIOLOGY

## 2017-03-17 PROCEDURE — 86703 HIV-1/HIV-2 1 RESULT ANTBDY: CPT

## 2017-03-17 PROCEDURE — 86701 HIV-1ANTIBODY: CPT

## 2017-03-17 PROCEDURE — 62326 NJX INTERLAMINAR LMBR/SAC: CPT | Performed by: ANESTHESIOLOGY

## 2017-03-17 PROCEDURE — 86592 SYPHILIS TEST NON-TREP QUAL: CPT

## 2017-03-17 PROCEDURE — 72200005 HC VAGINAL DELIVERY LEVEL II

## 2017-03-17 PROCEDURE — 86900 BLOOD TYPING SEROLOGIC ABO: CPT

## 2017-03-17 RX ORDER — OXYTOCIN/RINGER'S LACTATE 20/1000 ML
2 PLASTIC BAG, INJECTION (ML) INTRAVENOUS CONTINUOUS
Status: DISCONTINUED | OUTPATIENT
Start: 2017-03-17 | End: 2017-03-19

## 2017-03-17 RX ORDER — OXYCODONE AND ACETAMINOPHEN 5; 325 MG/1; MG/1
1 TABLET ORAL EVERY 4 HOURS PRN
Qty: 30 TABLET | Refills: 0 | Status: SHIPPED | OUTPATIENT
Start: 2017-03-17 | End: 2017-05-18

## 2017-03-17 RX ORDER — METHYLERGONOVINE MALEATE 0.2 MG/ML
200 INJECTION INTRAVENOUS
Status: DISCONTINUED | OUTPATIENT
Start: 2017-03-17 | End: 2017-03-19 | Stop reason: HOSPADM

## 2017-03-17 RX ORDER — OXYCODONE AND ACETAMINOPHEN 10; 325 MG/1; MG/1
1 TABLET ORAL EVERY 4 HOURS PRN
Status: DISCONTINUED | OUTPATIENT
Start: 2017-03-17 | End: 2017-03-19

## 2017-03-17 RX ORDER — OXYCODONE AND ACETAMINOPHEN 5; 325 MG/1; MG/1
1 TABLET ORAL EVERY 4 HOURS PRN
Status: DISCONTINUED | OUTPATIENT
Start: 2017-03-17 | End: 2017-03-19 | Stop reason: HOSPADM

## 2017-03-17 RX ORDER — OXYTOCIN/RINGER'S LACTATE 20/1000 ML
41.65 PLASTIC BAG, INJECTION (ML) INTRAVENOUS CONTINUOUS
Status: ACTIVE | OUTPATIENT
Start: 2017-03-17 | End: 2017-03-17

## 2017-03-17 RX ORDER — DIPHENHYDRAMINE HYDROCHLORIDE 50 MG/ML
25 INJECTION INTRAMUSCULAR; INTRAVENOUS EVERY 4 HOURS PRN
Status: DISCONTINUED | OUTPATIENT
Start: 2017-03-17 | End: 2017-03-19 | Stop reason: HOSPADM

## 2017-03-17 RX ORDER — METOCLOPRAMIDE HYDROCHLORIDE 5 MG/ML
10 INJECTION INTRAMUSCULAR; INTRAVENOUS ONCE
Status: DISCONTINUED | OUTPATIENT
Start: 2017-03-17 | End: 2017-03-19

## 2017-03-17 RX ORDER — SIMETHICONE 80 MG
1 TABLET,CHEWABLE ORAL EVERY 6 HOURS PRN
Status: DISCONTINUED | OUTPATIENT
Start: 2017-03-17 | End: 2017-03-19 | Stop reason: HOSPADM

## 2017-03-17 RX ORDER — BUPIVACAINE HYDROCHLORIDE 2.5 MG/ML
INJECTION, SOLUTION EPIDURAL; INFILTRATION; INTRACAUDAL
Status: COMPLETED
Start: 2017-03-17 | End: 2017-03-17

## 2017-03-17 RX ORDER — CALCIUM CARBONATE 500(1250)
500 TABLET,CHEWABLE ORAL DAILY PRN
Status: DISCONTINUED | OUTPATIENT
Start: 2017-03-17 | End: 2017-03-17

## 2017-03-17 RX ORDER — DIPHENHYDRAMINE HCL 25 MG
25 CAPSULE ORAL EVERY 4 HOURS PRN
Status: DISCONTINUED | OUTPATIENT
Start: 2017-03-17 | End: 2017-03-19 | Stop reason: HOSPADM

## 2017-03-17 RX ORDER — HYDROCORTISONE 25 MG/G
CREAM TOPICAL 3 TIMES DAILY PRN
Status: DISCONTINUED | OUTPATIENT
Start: 2017-03-17 | End: 2017-03-19 | Stop reason: HOSPADM

## 2017-03-17 RX ORDER — FAMOTIDINE 10 MG/ML
20 INJECTION INTRAVENOUS ONCE
Status: DISCONTINUED | OUTPATIENT
Start: 2017-03-17 | End: 2017-03-19

## 2017-03-17 RX ORDER — FENTANYL CITRATE 50 UG/ML
INJECTION, SOLUTION INTRAMUSCULAR; INTRAVENOUS
Status: COMPLETED
Start: 2017-03-17 | End: 2017-03-17

## 2017-03-17 RX ORDER — ACETAMINOPHEN 325 MG/1
650 TABLET ORAL EVERY 6 HOURS PRN
Status: DISCONTINUED | OUTPATIENT
Start: 2017-03-17 | End: 2017-03-19 | Stop reason: HOSPADM

## 2017-03-17 RX ORDER — ZOLPIDEM TARTRATE 5 MG/1
5 TABLET ORAL NIGHTLY PRN
Status: DISCONTINUED | OUTPATIENT
Start: 2017-03-17 | End: 2017-03-19 | Stop reason: HOSPADM

## 2017-03-17 RX ORDER — POLYETHYLENE GLYCOL 3350 17 G/17G
17 POWDER, FOR SOLUTION ORAL DAILY
Status: DISCONTINUED | OUTPATIENT
Start: 2017-03-17 | End: 2017-03-19 | Stop reason: HOSPADM

## 2017-03-17 RX ORDER — SODIUM CHLORIDE, SODIUM LACTATE, POTASSIUM CHLORIDE, CALCIUM CHLORIDE 600; 310; 30; 20 MG/100ML; MG/100ML; MG/100ML; MG/100ML
INJECTION, SOLUTION INTRAVENOUS CONTINUOUS
Status: DISCONTINUED | OUTPATIENT
Start: 2017-03-17 | End: 2017-03-19

## 2017-03-17 RX ORDER — ONDANSETRON 8 MG/1
8 TABLET, ORALLY DISINTEGRATING ORAL EVERY 8 HOURS PRN
Status: DISCONTINUED | OUTPATIENT
Start: 2017-03-17 | End: 2017-03-19 | Stop reason: HOSPADM

## 2017-03-17 RX ORDER — FENTANYL/BUPIVACAINE/NS/PF 2MCG/ML-.1
PLASTIC BAG, INJECTION (ML) INJECTION CONTINUOUS PRN
Status: DISCONTINUED | OUTPATIENT
Start: 2017-03-17 | End: 2017-03-17

## 2017-03-17 RX ORDER — FENTANYL/BUPIVACAINE/NS/PF 2MCG/ML-.1
PLASTIC BAG, INJECTION (ML) INJECTION CONTINUOUS
Status: DISCONTINUED | OUTPATIENT
Start: 2017-03-17 | End: 2017-03-19

## 2017-03-17 RX ORDER — AMOXICILLIN 250 MG
1 CAPSULE ORAL NIGHTLY
Status: DISCONTINUED | OUTPATIENT
Start: 2017-03-17 | End: 2017-03-19 | Stop reason: HOSPADM

## 2017-03-17 RX ORDER — CALCIUM CARBONATE 200(500)MG
500 TABLET,CHEWABLE ORAL DAILY PRN
Status: DISCONTINUED | OUTPATIENT
Start: 2017-03-17 | End: 2017-03-19 | Stop reason: HOSPADM

## 2017-03-17 RX ORDER — IBUPROFEN 600 MG/1
600 TABLET ORAL EVERY 6 HOURS PRN
Status: DISCONTINUED | OUTPATIENT
Start: 2017-03-17 | End: 2017-03-19 | Stop reason: HOSPADM

## 2017-03-17 RX ORDER — SODIUM CITRATE AND CITRIC ACID MONOHYDRATE 334; 500 MG/5ML; MG/5ML
30 SOLUTION ORAL ONCE
Status: DISCONTINUED | OUTPATIENT
Start: 2017-03-17 | End: 2017-03-19

## 2017-03-17 RX ORDER — MISOPROSTOL 200 UG/1
800 TABLET ORAL ONCE
Status: COMPLETED | OUTPATIENT
Start: 2017-03-17 | End: 2017-03-17

## 2017-03-17 RX ORDER — ONDANSETRON 8 MG/1
8 TABLET, ORALLY DISINTEGRATING ORAL EVERY 8 HOURS PRN
Status: DISCONTINUED | OUTPATIENT
Start: 2017-03-17 | End: 2017-03-17

## 2017-03-17 RX ORDER — CARBOPROST TROMETHAMINE 250 UG/ML
250 INJECTION, SOLUTION INTRAMUSCULAR
Status: DISCONTINUED | OUTPATIENT
Start: 2017-03-17 | End: 2017-03-19 | Stop reason: HOSPADM

## 2017-03-17 RX ORDER — OXYCODONE AND ACETAMINOPHEN 5; 325 MG/1; MG/1
1 TABLET ORAL EVERY 4 HOURS PRN
Status: DISCONTINUED | OUTPATIENT
Start: 2017-03-17 | End: 2017-03-19

## 2017-03-17 RX ORDER — CEFAZOLIN SODIUM 2 G/50ML
2 SOLUTION INTRAVENOUS
Status: COMPLETED | OUTPATIENT
Start: 2017-03-17 | End: 2017-03-17

## 2017-03-17 RX ORDER — LIDOCAINE HYDROCHLORIDE 10 MG/ML
INJECTION INFILTRATION; PERINEURAL
Status: COMPLETED
Start: 2017-03-17 | End: 2017-03-17

## 2017-03-17 RX ORDER — FENTANYL/BUPIVACAINE/NS/PF 2MCG/ML-.1
PLASTIC BAG, INJECTION (ML) INJECTION
Status: DISPENSED
Start: 2017-03-17 | End: 2017-03-17

## 2017-03-17 RX ORDER — OXYTOCIN/RINGER'S LACTATE 20/1000 ML
20 PLASTIC BAG, INJECTION (ML) INTRAVENOUS ONCE
Status: DISCONTINUED | OUTPATIENT
Start: 2017-03-17 | End: 2017-03-17

## 2017-03-17 RX ORDER — OXYCODONE AND ACETAMINOPHEN 10; 325 MG/1; MG/1
1 TABLET ORAL EVERY 4 HOURS PRN
Status: DISCONTINUED | OUTPATIENT
Start: 2017-03-17 | End: 2017-03-19 | Stop reason: HOSPADM

## 2017-03-17 RX ORDER — MISOPROSTOL 200 UG/1
200 TABLET ORAL
Status: DISCONTINUED | OUTPATIENT
Start: 2017-03-17 | End: 2017-03-19 | Stop reason: HOSPADM

## 2017-03-17 RX ADMIN — Medication 10 ML/HR: at 04:03

## 2017-03-17 RX ADMIN — IBUPROFEN 600 MG: 600 TABLET, FILM COATED ORAL at 04:03

## 2017-03-17 RX ADMIN — CEFAZOLIN SODIUM 2 G: 2 SOLUTION INTRAVENOUS at 10:03

## 2017-03-17 RX ADMIN — MISOPROSTOL 800 MCG: 200 TABLET ORAL at 09:03

## 2017-03-17 RX ADMIN — BUPIVACAINE HYDROCHLORIDE 5 ML: 2.5 INJECTION, SOLUTION EPIDURAL; INFILTRATION; INTRACAUDAL; PERINEURAL at 04:03

## 2017-03-17 RX ADMIN — STANDARDIZED SENNA CONCENTRATE AND DOCUSATE SODIUM 1 TABLET: 8.6; 5 TABLET, FILM COATED ORAL at 08:03

## 2017-03-17 RX ADMIN — POLYETHYLENE GLYCOL 3350 17 G: 17 POWDER, FOR SOLUTION ORAL at 04:03

## 2017-03-17 RX ADMIN — Medication 333 MILLI-UNITS/MIN: at 09:03

## 2017-03-17 RX ADMIN — IBUPROFEN 600 MG: 600 TABLET, FILM COATED ORAL at 10:03

## 2017-03-17 RX ADMIN — CEFAZOLIN SODIUM 2 G: 2 SOLUTION INTRAVENOUS at 04:03

## 2017-03-17 RX ADMIN — SODIUM CHLORIDE, SODIUM LACTATE, POTASSIUM CHLORIDE, AND CALCIUM CHLORIDE: .6; .31; .03; .02 INJECTION, SOLUTION INTRAVENOUS at 01:03

## 2017-03-17 RX ADMIN — FENTANYL CITRATE 100 MCG: 50 INJECTION, SOLUTION INTRAMUSCULAR; INTRAVENOUS at 04:03

## 2017-03-17 RX ADMIN — CALCIUM CARBONATE (ANTACID) CHEW TAB 500 MG 500 MG: 500 CHEW TAB at 04:03

## 2017-03-17 RX ADMIN — LIDOCAINE HYDROCHLORIDE 200 MG: 10 INJECTION, SOLUTION INFILTRATION; PERINEURAL at 09:03

## 2017-03-17 RX ADMIN — Medication 2 MILLI-UNITS/MIN: at 01:03

## 2017-03-17 RX ADMIN — OXYCODONE AND ACETAMINOPHEN 1 TABLET: 5; 325 TABLET ORAL at 12:03

## 2017-03-17 NOTE — PLAN OF CARE
Problem: Patient Care Overview  Goal: Plan of Care Review  Outcome: Ongoing (interventions implemented as appropriate)  Requested patient call lactation for latch assistance; developed the following breastfeeding plan of care with patient: she will breastfeed baby on cue until content at least 8 times in 24 hours observing for signs of milk transfer; she will wake baby prn;

## 2017-03-17 NOTE — L&D DELIVERY NOTE
Rapid progression to complete.  Patient pushing initially with good maternal effort with the vtx  on the perineum but after 6 contractions, no delivery of the head and maternal exhaustion decision was made to cut episitomy which facilitated delivery of the vtx and body without difficulty.  Nuchal loose and reduced.  Cord clamped and cut.  Placenta delivered spontaneous intact.  Mild atony responded to IV pitocin, uterine massage and cytotec 800mcg pr.  Perineum inspected 4th degree midline laceration extending less than 1cm deep into mucosa.  Rectal mucosa repaired with running stitch 4-0 vicryl.  External and internal anal sphincter repaired using 0 vicryl in an overlapping fashion.  Then 2nd degree laceration with succal laceration repaired with 2-0 vicryl.  Excellent hemostasis.  Lap and needle count correct x 2.         Delivery Information for  Max Nassar    Birth information:  YOB: 2015   Time of birth: 9:15 AM   Sex: male   Head Delivery Date/Time: 3/17/2017  9:15 AM   Delivery type: Vaginal, Spontaneous Delivery   Gestational Age: 39w0d    Delivery Providers    Delivering clinician:  CHANTEL PANG   Other personnel:   Provider Role   GRACE TRIPLETT JENNIFER L. MCCAFFERY, RENETTA RAMIREZ, JOEL KIMBLE                    Measurements    Weight:  3402 g Length:  50.8 cm   Head circum.:  34.3 cm Chest circum.:  33 cm           Assessment    Living status:  Yes   Apgars    1 Minute:   5 Minute:   10 Minute 15 Minute 20 Minute   Skin Color: 1  1       Heart Rate: 2  2       Reflex Irritability: 2  2       Muscle Tone: 2  2       Respiratory Effort: 2  2       Total: 9  9                  Apgars Assigned By:  KASHIF ROY RN/ DANNIE RAMIREZ RN          Assisted Delivery Details:    Forceps attempted?:  No   Vacuum extractor attempted?:  No             Shoulder Dystocia    Shoulder dystocia present?:  No                                             Presentation  and Position    Presentation: Vertex   Position:                    Interventions/Resuscitation    Method:  Bulb Suctioning, Tactile Stimulation        Cord    Vessels:  3 vessels   Complications:  Nuchal   Nuchal Intervention:  reduced   Nuchal Cord Description:  loose nuchal cord   Number of Loops:  1   Delayed Cord Clamping?:  No   Cord Clamped Date/Time:  3/17/2017  9:15 AM   Cord Blood Disposition:  Sent with Baby   Gases Sent?:  No   Stem Cell Collection (by MD):  No        Placenta    Date and time:  3/17/2017  9:19 AM   Removal:  Spontaneous   Appearance:  Intact   Placenta disposition:  Discarded            Labor Events:       labor: No     Labor Onset Date/Time:         Dilation Complete Date/Time:         Start Pushing Date/Time:       Rupture Date/Time: 17  0450         Rupture type:           Fluid Amount:        Fluid Color:        Fluid Odor:        Membrane Status (PeriCalm): ARM (Artificial Rupture)      Rupture Date/Time (PeriCalm): 2017 04:51:00      Fluid Amount (PeriCalm): Moderate      Fluid Color (PeriCalm): Clear       steroids: Full Course     Antibiotics given for GBS: No     Induction: oxytocin     Indications for induction:  Elective     Augmentation: amniotomy     Indications for augmentation:       Labor complications: None     Additional complications:          Cervical ripening:                     Delivery:      Episiotomy: Median     Indication for Episiotomy: Instrumented Delivery     Perineal Lacerations: 4th Repaired:  Yes   Periurethral Laceration: none Repaired:     Labial Laceration: none Repaired:     Sulcus Laceration: none Repaired:     Vaginal Laceration: No Repaired:     Cervical Laceration: No Repaired:     Repair suture:       Repair # of packets: 5     Blood loss (ml): 690     Vaginal Sweep Performed: Yes     Surgicount Correct: Yes       Other providers:       Anesthesia    Method:  Epidural              Details (if  applicable):  Trial of Labor      Categorization:      Priority:     Indications for :     Incision Type:       Additional  information:  Forceps:    Vacuum:    Breech:    Observed anomalies    Other (Comments):

## 2017-03-17 NOTE — ANESTHESIA PREPROCEDURE EVALUATION
2017  Earlene Nassar is a 26 y.o. female G1 who presents at 39 weeks for IOL secondary to cervical dilation (5 cm). No previous problems with anesthesia (T&A as a child)    OB History    Para Term  AB SAB TAB Ectopic Multiple Living   1               # Outcome Date GA Lbr Andrew/2nd Weight Sex Delivery Anes PTL Lv   1 Current               Obstetric Comments   Menarche ~14       Wt Readings from Last 1 Encounters:   17 0100 68 kg (150 lb)       BP Readings from Last 3 Encounters:   17 127/88   03/15/17 118/78   17 122/84       Patient Active Problem List   Diagnosis    Pregnancy       Past Surgical History:   Procedure Laterality Date    ADENOIDECTOMY      TONSILLECTOMY         Social History     Social History    Marital status: Single     Spouse name: N/A    Number of children: N/A    Years of education: N/A     Occupational History    Not on file.     Social History Main Topics    Smoking status: Never Smoker    Smokeless tobacco: Never Used    Alcohol use No    Drug use: No    Sexual activity: Not Currently     Partners: Male     Other Topics Concern    Not on file     Social History Narrative         Chemistry        Component Value Date/Time     2017 1647    K 3.5 2017 1647     2017 1647    CO2 21 (L) 2017 1647    BUN 6 2017 1647    CREATININE 0.5 2017 1647    GLU 86 2017 1647        Component Value Date/Time    CALCIUM 9.1 2017 1647    ALKPHOS 178 (H) 2017 1647    AST 16 2017 1647    ALT 15 2017 1647    BILITOT 0.3 2017 1647            Lab Results   Component Value Date    WBC 12.88 (H) 2017    HGB 11.9 (L) 2017    HCT 34.8 (L) 2017    MCV 92 2017     2017       [facility] Anesthesia Evaluation    I have reviewed the Patient Summary  Reports.     I have reviewed the Medications.     Review of Systems  Anesthesia Hx:  No problems with previous Anesthesia  Neg history of prior surgery. Denies Family Hx of Anesthesia complications.   Denies Personal Hx of Anesthesia complications.   Hematology/Oncology:  Hematology Normal   Oncology Normal     EENT/Dental:EENT/Dental Normal   Cardiovascular:  Cardiovascular Normal     Pulmonary:  Pulmonary Normal    Renal/:  Renal/ Normal     Hepatic/GI:  Hepatic/GI Normal    Musculoskeletal:  Musculoskeletal Normal    Neurological:  Neurology Normal    Endocrine:  Endocrine Normal    Psych:  Psychiatric Normal           Physical Exam  General:  Well nourished    Airway/Jaw/Neck:  Airway Findings: Mouth Opening: Normal Tongue: Normal  General Airway Assessment: Adult  Mallampati: II  Improves to II with phonation.  TM Distance: 4 - 6 cm      Dental:  Dental Findings: In tact   Chest/Lungs:  Chest/Lungs Findings: Normal Respiratory Rate     Heart/Vascular:  Heart Findings: Rate: Normal  Rhythm: Regular Rhythm  Vascular Findings:  Vascular Access: Peripheral IV(s)        Mental Status:  Mental Status Findings:  Cooperative, Alert and Oriented         Anesthesia Plan  Type of Anesthesia, risks & benefits discussed:  Anesthesia Type:  CSE, epidural, general, spinal  Patient's Preference:   Intra-op Monitoring Plan: standard ASA monitors  Intra-op Monitoring Plan Comments:   Post Op Pain Control Plan:   Post Op Pain Control Plan Comments:   Induction:   IV  Beta Blocker:  Patient is not currently on a Beta-Blocker (No further documentation required).       Informed Consent: Patient understands risks and agrees with Anesthesia plan.  Questions answered. Anesthesia consent signed with patient.  ASA Score: 2     Day of Surgery Review of History & Physical:    H&P update referred to the surgeon.         Ready For Surgery From Anesthesia Perspective.

## 2017-03-17 NOTE — PROGRESS NOTES
Labor Progress Note        Subjective:      Patient currently doing well without complaints    Objective:      Temp:  [97.3 °F (36.3 °C)-97.9 °F (36.6 °C)] 97.9 °F (36.6 °C)  Pulse:  [] 77  Resp:  [16] 16  SpO2:  [87 %-100 %] 100 %  BP: ()/(60-98) 116/79  Body mass index is 26.57 kg/(m^2).     General: no acute distress  Electronic Fetal Monitoring:  FHT: 140 bpm, accelerations present, decelerations absent Category: 2, overall reassuring occ mild variable noted                 TOCO: Contractions: irregular, every 2 minutes   Cx: C/C/0 vtx     Assessment:     1. IUP at  here for labor induction for adv cervical dilation  2.   Group & Rh   Date Value Ref Range Status   2017 A POS  Final     3.   Lab Results   Component Value Date    RUBELLAIGGAN 26.1 (H) 2016    immune  4. GBS negative  5. Category 2, overall reassuring FHT     Plan:     1. Continue active management of labor  2. Expect  soon  Will set up delivery table and begin pushing

## 2017-03-17 NOTE — LACTATION NOTE
Provided basic lactation education; requested patient call lactation for assistance with breastfeeding;

## 2017-03-17 NOTE — ANESTHESIA PROCEDURE NOTES
Epidural    Patient location during procedure: OB   Reason for block: primary anesthetic   Diagnosis: iup in labor   Start time: 3/17/2017 4:22 AM  Timeout: 3/17/2017 4:21 AM  End time: 3/17/2017 4:40 AM  Staffing  Anesthesiologist: SHEMAR COHEN  Resident/CRNA: AMANDA LOW  Performed by: resident/CRNA   Preanesthetic Checklist  Completed: patient identified, site marked, surgical consent, pre-op evaluation, timeout performed, IV checked, risks and benefits discussed, monitors and equipment checked, anesthesia consent given, hand hygiene performed and patient being monitored  Preparation  Patient position: sitting  Prep: ChloraPrep  Patient monitoring: Pulse Ox and Blood Pressure  Epidural  Skin Anesthetic: lidocaine 1%  Skin Wheal: 3 mL  Administration type: single shot  Approach: midline  Interspace: L3-4  Injection technique: FRANKIE saline  Needle and Epidural Catheter  Needle type: Tuohy   Needle gauge: 17  Needle length: 3.5 inches  Needle insertion depth: 5 cm  Catheter type: springwound  Catheter size: 19 G  Catheter at skin depth: 10 cm  Test dose: 3 mL of lidocaine 1.5% with Epi 1-to-200,000  Additional Documentation: incremental injection, negative aspiration for heme and CSF, no paresthesia on injection, no signs/symptoms of IV or SA injection, no significant complaints from patient and no significant pain on injection  Needle localization: anatomical landmarks  Medications:  Bolus administered: 10 mL of 0.125% bupivacaine  Opioid administered: 100 mcg of   fentanyl  Volume per aspiration: 5 mL  Time between aspirations: 3 minutes  Assessment  Ease of block: easy  Patient's tolerance of the procedure: comfortable throughout block and no complaints

## 2017-03-17 NOTE — H&P
H&P Addendum to H&P Dated 3/9/2017    Patient has been seen and examined. NO changes from prior exam on 3/9/17.    LMP 06/17/2016 (Exact Date)  FHR: reassuring.     A: Pregnancy 39  0/7 wga, advanced cervical dilation  GBS negative  Plan: will proceed with elective induction of labor with pitocin & AROM once in regular contraction pattern.

## 2017-03-17 NOTE — IP AVS SNAPSHOT
[facility], [place], operated by [facility]  [address] [place], [place] [address]  Phone: [phone]           Patient Discharge Instructions     Our goal is to set you up for success. This packet includes information on your condition, medications, and your home care. It will help you to care for yourself so you don't get sicker and need to go back to the hospital.     Please ask your nurse if you have any questions.        There are many details to remember when preparing to leave the hospital. Here is what you will need to do:    1. Take your medicine. If you are prescribed medications, review your Medication List in the following pages. You may have new medications to  at the pharmacy and others that you'll need to stop taking. Review the instructions for how and when to take your medications. Talk with your doctor or nurses if you are unsure of what to do.     2. Go to your follow-up appointments. Specific follow-up information is listed in the following pages. Your may be contacted by a transition nurse or clinical provider about future appointments. Be sure we have all of the phone numbers to reach you, if needed. Please contact your provider's office if you are unable to make an appointment.     3. Watch for warning signs. Your doctor or nurse will give you detailed warning signs to watch for and when to call for assistance. These instructions may also include educational information about your condition. If you experience any of warning signs to your health, call your doctor.               [facility]  Unless otherwise directed by your provider, please contact [facility], our nurse care line that is available for 24/7 assistance.     [phone] (toll-free)    Registered nurses in the [facility] provide clinical advisement, health education, appointment booking, and other advisory services.                    ** Verify the list of medication(s) below is accurate and up to  date. Carry this with you in case of emergency. If your medications have changed, please notify your healthcare provider.             Medication List      START taking these medications        Additional Info                      ibuprofen 600 MG tablet   Commonly known as:  ADVIL,MOTRIN   Quantity:  60 tablet   Refills:  0   Dose:  600 mg    Last time this was given:  600 mg on 3/18/2017  7:45 PM   Instructions:  Take 1 tablet (600 mg total) by mouth every 6 (six) hours as needed for Pain (cramping).     Begin Date    AM    Noon    PM    Bedtime       oxycodone-acetaminophen 5-325 mg per tablet   Commonly known as:  PERCOCET   Quantity:  30 tablet   Refills:  0   Dose:  1 tablet    Last time this was given:  1 tablet on 3/17/2017 12:09 PM   Instructions:  Take 1 tablet by mouth every 4 (four) hours as needed for Pain.     Begin Date    AM    Noon    PM    Bedtime       polyethylene glycol 17 gram Pwpk   Commonly known as:  GLYCOLAX   Quantity:  24 each   Refills:  0   Dose:  17 g    Last time this was given:  17 g on 3/19/2017  9:24 AM   Instructions:  Take 17 g by mouth once daily.     Begin Date    AM    Noon    PM    Bedtime         CONTINUE taking these medications        Additional Info                      prenatal #108-iron,carbonyl-FA 30-1 mg Tab   Refills:  0    Instructions:  Take by mouth once daily.     Begin Date    AM    Noon    PM    Bedtime            Where to Get Your Medications      These medications were sent to [facility]/pharmacy #[id number] - [place] - [address] [facility]  [address] [address]     Phone:  [phone]     ibuprofen 600 MG tablet    polyethylene glycol 17 gram Pwpk         You can get these medications from any pharmacy     Bring a paper prescription for each of these medications     oxycodone-acetaminophen 5-325 mg per tablet                  Please bring to all follow up appointments:    1. A copy of your discharge instructions.  2. All medicines you are currently  taking in their original bottles.  3. Identification and insurance card.    Please arrive 15 minutes ahead of scheduled appointment time.    Please call 24 hours in advance if you must reschedule your appointment and/or time.        Your Scheduled Appointments     Apr 28, 2017  9:45 AM CDT   Post Partum with Martin Sky MD   [facility] ([facility])    [address]  [address]  [facility] [address]   [phone]              Follow-up Information     Follow up with Martin Sky MD In 6 weeks.    Specialties:  Obstetrics and Gynecology, Obstetrics, Gynecology    Why:  post partum exam    Contact information:    [address]  [address]  [facility] [address] [phone]          Follow up with Martin Sky MD In 3 weeks.    Specialties:  Obstetrics and Gynecology, Obstetrics, Gynecology    Why:  laceration check    Contact information:    [address]  [address]  [facility] [id number] [phone]          Discharge Instructions     Future Orders    Activity as tolerated     Call MD for:  difficulty breathing or increased cough     Call MD for:  persistent dizziness, light-headedness, or visual disturbances     Call MD for:  persistent nausea and vomiting or diarrhea     Call MD for:  redness, tenderness, or signs of infection (pain, swelling, redness, odor or green/yellow discharge around incision site)     Call MD for:  severe persistent headache     Call MD for:  severe uncontrolled pain     Call MD for:  temperature >100.4     Diet general     Questions:    Total calories:      Fat restriction, if any:      Protein restriction, if any:      Na restriction, if any:      Fluid restriction:      Additional restrictions:      No dressing needed     Other restrictions (specify):     Comments:    Pelvic rest for 6 weeks.  No heavy lifting great than 30 lbs for 6 week.  No driving for at least 2 weeks, or as long as patient is taking Narcotics.   No baths as long as  having vaginal bleeding.        Discharge Instructions       Breastfeeding Discharge Instructions       Feed the baby at the earliest sign of hunger or comfort  o Hands to mouth, sucking motions  o Rooting or searching for something to suck on  o Dont wait for crying - it is a sign of distress     The feedings may be 8-12 times per 24hrs and will not follow a schedule   Avoid pacifiers and bottles for the first 4 weeks   Alternate the breast you start the feeding with, or start with the breast that feels the fullest   Switch breasts when the baby takes himself off the breast or falls asleep   Keep offering breasts until the baby looks full, no longer gives hunger signs, and stays asleep when placed on his back in the crib   If the baby is sleepy and wont wake for a feeding, put the baby skin-to-skin dressed in a diaper against the mothers bare chest   Sleep near your baby   The baby should be positioned and latched on to the breast correctly  o Chest-to-chest, chin in the breast  o Babys lips are flipped outward  o Babys mouth is stretched open wide like a shout  o Babys sucking should feel like tugging to the mother  - The baby should be drinking at the breast:  o You should hear swallowing or gulping throughout the feeding  o You should see milk on the babys lips when he comes off the breast  o Your breasts should be softer when the baby is finished feeding  o The baby should look relaxed at the end of feedings  o After the 4th day and your milk is in:  o The babys poop should turn bright yellow and be loose, watery, and seedy  o The baby should have at least 3-4 poops the size of the palm of your hand per day  o The baby should have at least 5-6 wet diapers per day  o The urine should be light yellow in color  You should drink when you are thirsty and eat a healthy diet when you are    hungry.     Take naps to get the rest you need.   Take medications and/or drink alcohol only with permission  of your obstetrician    or the babys pediatrician.  You can also call the Infant Risk Center,   ([phone]), Monday-Friday, 8am-5pm Central time, to get the most   up-to-date evidence-based information on the use of medications during   pregnancy and breastfeeding.      The baby should be examined by a pediatrician at 3-5 days of age.  Once your   milk comes in, the baby should be gaining at least ½ - 1oz each day and should be back to birthweight no later than 10-14 days of age.          Community Resources    [facility] Breastfeeding Warmline: [phone]   Local [facility] clinics: provide incentives and breastpumps to eligible mothers  [employer]he [employer] (LLLI):  mother-to-mother support group website        www.Little Borrowed Dressl.X-1  [facility] La Leche League mother-to-mother support groups:        www.mySBX.gamesGRABR        La Leche League [facility]   Dr. Mukund Curry website for latch videos and general information:        www.breastfeedinginc.ca  Infant Risk Center is a call center that provides information about the safety of taking medications while breastfeeding.  Call 8-200-656-0889, M-F, 8am-5pm, CT.  International Lactation Consultant Association provides resources for assistance:        www.ilca.org  [facility] Breastfeeding Coalition provides informationand resources for parents  and the community    http://louisianabreastfeeding.org     [employer] is a mom-to-mom support group:                             www.nolanesting.gamesGRABR//breastfeedng-support/  [employer] for Healthy Babies:  4-650-519-BABY(5407)  [employer]: a breastfeeding support group for women of color, 336.584.7180          Primary Diagnosis     Your primary diagnosis was:  Normal Vaginal Delivery      Admission Information     Date & Time Provider Department CSN    3/17/2017 12:03 AM Martin Sky MD [facility] [id number]      Care Providers     Provider Role Specialty Primary office phone    Martin Sky MD  "Attending Provider Obstetrics and Gynecology [phone]      Your Vitals Were     BP Pulse Temp Resp Height Weight    124/81 93 97.9 °F (36.6 °C) (Oral) 18 5' 3" (1.6 m) 68 kg (150 lb)    Last Period SpO2 BMI          06/17/2016 (Exact Date) 97% 26.57 kg/m2        Recent Lab Values     No lab values to display.      Allergies as of 3/19/2017     No Known Allergies      Advance Directives     An advance directive is a document which, in the event you are no longer able to make decisions for yourself, tells your healthcare team what kind of treatment you do or do not want to receive, or who you would like to make those decisions for you.  If you do not currently have an advance directive, [facility] encourages you to create one.  For more information call:  [phone]), [phone] ([phone]),  or log on to www.[email or web address].[email or web address]/vivian.        Language Assistance Services     ATTENTION: Language assistance services are available, free of charge. Please call [phone].      ATENCIÓN: Si habla español, tiene a peres disposición servicios gratuitos de asistencia lingüística. Llame al [phone].     RAMA Ý: N?u b?n nói Ti?ng Vi?t, có các d?ch v? h? tr? ngôn ng? mi?n phí dành cho b?n. G?i s? [phone].         [facility] complies with applicable Federal civil rights laws and does not discriminate on the basis of race, color, national origin, age, disability, or sex.        "

## 2017-03-17 NOTE — PROGRESS NOTES
Dr. Sky at patient's bedside. MD requesting donut or egg crate mattress for patient's bed; patient is having discomfort to her perineum. Charge RN notified. Will ask if available in hospital. In the mean time, assisted patient to sitting on pillow for extra comfort.

## 2017-03-17 NOTE — DISCHARGE INSTRUCTIONS
Breastfeeding Discharge Instructions       Feed the baby at the earliest sign of hunger or comfort  o Hands to mouth, sucking motions  o Rooting or searching for something to suck on  o Dont wait for crying - it is a sign of distress     The feedings may be 8-12 times per 24hrs and will not follow a schedule   Avoid pacifiers and bottles for the first 4 weeks   Alternate the breast you start the feeding with, or start with the breast that feels the fullest   Switch breasts when the baby takes himself off the breast or falls asleep   Keep offering breasts until the baby looks full, no longer gives hunger signs, and stays asleep when placed on his back in the crib   If the baby is sleepy and wont wake for a feeding, put the baby skin-to-skin dressed in a diaper against the mothers bare chest   Sleep near your baby   The baby should be positioned and latched on to the breast correctly  o Chest-to-chest, chin in the breast  o Babys lips are flipped outward  o Babys mouth is stretched open wide like a shout  o Babys sucking should feel like tugging to the mother  - The baby should be drinking at the breast:  o You should hear swallowing or gulping throughout the feeding  o You should see milk on the babys lips when he comes off the breast  o Your breasts should be softer when the baby is finished feeding  o The baby should look relaxed at the end of feedings  o After the 4th day and your milk is in:  o The babys poop should turn bright yellow and be loose, watery, and seedy  o The baby should have at least 3-4 poops the size of the palm of your hand per day  o The baby should have at least 5-6 wet diapers per day  o The urine should be light yellow in color  You should drink when you are thirsty and eat a healthy diet when you are    hungry.     Take naps to get the rest you need.   Take medications and/or drink alcohol only with permission of your obstetrician    or the babys pediatrician.  You can  also call the [facility] Risk [facility],   ([phone]), Monday-Friday, 8am-5pm Central time, to get the most   up-to-date evidence-based information on the use of medications during   pregnancy and breastfeeding.      The baby should be examined by a pediatrician at 3-5 days of age.  Once your   milk comes in, the baby should be gaining at least ½ - 1oz each day and should be back to birthweight no later than 10-14 days of age.          Community Resources    [facility] Breastfeeding Warmline: [phone]   Local [facility] clinics: provide incentives and breastpumps to eligible mothers  [employer] (LLLI):  mother-to-mother support group website        www.[employer]iders  Local La Leche League mother-to-mother support groups:        www.Primo1D        La Leche League [facility]   Dr. Mukund Curry website for latch videos and general information:        www.breastfeedinginc.ca  [facility] is a call center that provides information about the safety of taking medications while breastfeeding.  Call 1-786.866.2375, M-F, 8am-5pm, [place].  International Lactation Consultant Association provides resources for assistance:        www.ilca.org  [facility] Breastfeeding Coalition provides informationand resources for parents  and the community    http://Nemours Foundationastfeeding.org     [employer] is a mom-to-mom support group:                             www.[employer].com//breastfeedng-support/  [employer] for Healthy Babies:  0-102-177-BABY(7085)  [employer]: a breastfeeding support group for women of color, 651.510.5196

## 2017-03-18 PROCEDURE — 25000003 PHARM REV CODE 250: Performed by: OBSTETRICS & GYNECOLOGY

## 2017-03-18 PROCEDURE — 11000001 HC ACUTE MED/SURG PRIVATE ROOM

## 2017-03-18 RX ADMIN — POLYETHYLENE GLYCOL 3350 17 G: 17 POWDER, FOR SOLUTION ORAL at 09:03

## 2017-03-18 RX ADMIN — STANDARDIZED SENNA CONCENTRATE AND DOCUSATE SODIUM 1 TABLET: 8.6; 5 TABLET, FILM COATED ORAL at 07:03

## 2017-03-18 RX ADMIN — IBUPROFEN 600 MG: 600 TABLET, FILM COATED ORAL at 04:03

## 2017-03-18 RX ADMIN — IBUPROFEN 600 MG: 600 TABLET, FILM COATED ORAL at 07:03

## 2017-03-18 NOTE — PLAN OF CARE
Problem: Patient Care Overview  Goal: Plan of Care Review  Outcome: Ongoing (interventions implemented as appropriate)  Pt ambulating without difficulty; morgan in place. Patient safety maintained, side rails up, bed low and locked position.  Pain well controlled with PRN pain medication. Fundus midline, firm, with moderate lochia. VSS. Significant other at bedside; parents responding to infant cues and bonding appropriately. Will continue to monitor.

## 2017-03-18 NOTE — LACTATION NOTE
"   03/18/17 1300   Maternal Infant Assessment   Breast Density soft   Nipple(s) everted   Infant Assessment   Sucking Reflex present   Rooting Reflex present   Swallow Reflex present   LATCH Score   Latch 1-->repeated attempts, holds nipple in mouth, stimulate to suck   Audible Swallowing 1-->a few with stimulation   Type Of Nipple 2-->everted (after stimulation)   Comfort (Breast/Nipple) 1-->filling, red/small blisters/bruises, mild/mod discomfort   Hold (Positioning) 1-->minimal assist, teach one side: mother does other, staff holds   Score (less than 7 for 2/more consecutive times, consult Lactation Consultant) 6   Maternal Infant Feeding   Maternal Emotional State assist needed   Infant Positioning cross-cradle;clutch/"football"   Signs of Milk Transfer audible swallow;infant jaw motion present   Time Spent (min) 30-60 min   Nipple Shape After Feeding, Left compression, improves with positioning, asymmetric latch   Nipple Shape After Feeding, Right compression, improves with positioning and asymmetric latch   Latch Assistance yes   Feeding Infant   Effective Latch During Feeding yes   Audible Swallow yes   Suck/Swallow Coordination present   Lactation Interventions   Attachment Promotion breastfeeding assistance provided;counseling provided;skin-to-skin contact encouraged;face-to-face positioning promoted   Breastfeeding Assistance assisted with positioning;feeding cue recognition promoted;feeding on demand promoted;feeding session observed;infant latch-on verified;infant suck/swallow verified;support offered   Maternal Breastfeeding Support lactation counseling provided   Latch Promotion positioning assisted;infant moved to breast     "

## 2017-03-18 NOTE — LACTATION NOTE
03/18/17 1800   Maternal Infant Assessment   Breast Density soft   Areola elastic   Nipple(s) everted   Infant Assessment   Sucking Reflex present   Rooting Reflex present   Swallow Reflex present   LATCH Score   Latch 2-->grasps breast, tongue down, lips flanged, rhythmic sucking   Audible Swallowing 2-->spontaneous and intermittent (24 hrs old)   Type Of Nipple 2-->everted (after stimulation)   Comfort (Breast/Nipple) 1-->filling, red/small blisters/bruises, mild/mod discomfort   Hold (Positioning) 1-->minimal assist, teach one side: mother does other, staff holds   Score (less than 7 for 2/more consecutive times, consult Lactation Consultant) 8   Maternal Infant Feeding   Maternal Emotional State assist needed;relaxed   Infant Positioning cross-cradle   Signs of Milk Transfer audible swallow;infant jaw motion present   Time Spent (min) 30-60 min   Nipple Shape After Feeding, Left very minimal compression   Nipple Shape After Feeding, Right very minimal compression   Latch Assistance yes  (on first side, independent on 2nd side)   Breastfeeding Education milk expression, hand   Feeding Infant   Effective Latch During Feeding yes   Audible Swallow yes   Suck/Swallow Coordination present   Lactation Interventions   Attachment Promotion breastfeeding assistance provided;counseling provided;skin-to-skin contact encouraged   Breastfeeding Assistance assisted with positioning;feeding cue recognition promoted;feeding on demand promoted;feeding session observed;infant latch-on verified;infant suck/swallow verified   Maternal Breastfeeding Support lactation counseling provided   Latch Promotion positioning assisted;infant moved to breast

## 2017-03-18 NOTE — PROGRESS NOTES
PostPartum Progress Note        Subjective:      Post-Partum Day #1 after vaginal delivery complicated by fourth degree perineal laceration.    Patient is without complaints. Lochia decreasing. Breast feeding. Pain is well controlled. Patient is ambulating. Tolerating Full Regular diet.Overall mother and baby are doing well.     Objective:      Temp:  [97.6 °F (36.4 °C)-98.8 °F (37.1 °C)] 98 °F (36.7 °C)  Pulse:  [] 93  Resp:  [16-18] 18  BP: (115-137)/(71-87) 121/78    Intake/Output Summary (Last 24 hours) at 17 1043  Last data filed at 17 0600   Gross per 24 hour   Intake                0 ml   Output             4950 ml   Net            -4950 ml     Body mass index is 26.57 kg/(m^2).    General: no acute distress  Abdomen: soft, non-tender, non-distended; Fundus firm and below the umbilicus  Extremities: non-tender, symmetric, trace edema    Group & Rh   Date Value Ref Range Status   2017 A POS  Final     Recent Results (from the past 336 hour(s))   CBC auto differential    Collection Time: 17  7:31 PM   Result Value Ref Range    WBC 18.87 (H) 3.90 - 12.70 K/uL    Hemoglobin 10.0 (L) 12.0 - 16.0 g/dL    Hematocrit 29.7 (L) 37.0 - 48.5 %    Platelets 178 150 - 350 K/uL   CBC with Auto Differential    Collection Time: 17  1:00 AM   Result Value Ref Range    WBC 12.88 (H) 3.90 - 12.70 K/uL    Hemoglobin 11.9 (L) 12.0 - 16.0 g/dL    Hematocrit 34.8 (L) 37.0 - 48.5 %    Platelets 192 150 - 350 K/uL   CBC auto differential    Collection Time: 17  4:47 PM   Result Value Ref Range    WBC 13.07 (H) 3.90 - 12.70 K/uL    Hemoglobin 12.5 12.0 - 16.0 g/dL    Hematocrit 37.6 37.0 - 48.5 %    Platelets 229 150 - 350 K/uL          Assessment:     26 y.o.  S/P  Post-Partum Day #1  - Doing Well      Plan:     1. Continue routine postpartum care  2. Plan for D/C in AM   3. Plan for circumcision of  today

## 2017-03-18 NOTE — ANESTHESIA POSTPROCEDURE EVALUATION
"Anesthesia Post Evaluation    Patient: Earlene Nassar    Procedure(s) Performed: * No procedures listed *    Final Anesthesia Type: epidural  Patient location during evaluation: labor & delivery  Patient participation: Yes- Able to Participate  Level of consciousness: awake and alert and oriented  Post-procedure vital signs: reviewed and stable  Pain management: adequate  Airway patency: patent  PONV status at discharge: No PONV  Anesthetic complications: no      Cardiovascular status: blood pressure returned to baseline  Respiratory status: unassisted and spontaneous ventilation  Hydration status: euvolemic  Follow-up not needed.        Visit Vitals    /78    Pulse 93    Temp 36.7 °C (98 °F) (Oral)    Resp 18    Ht 5' 3" (1.6 m)    Wt 68 kg (150 lb)    LMP 06/17/2016 (Exact Date)    SpO2 97%    Breastfeeding Yes    BMI 26.57 kg/m2       Pain/Janet Score: Pain Rating Prior to Med Admin: 2 (3/18/2017  4:28 AM)  Pain Rating Post Med Admin: 0 (3/17/2017 11:30 PM)      "

## 2017-03-18 NOTE — LACTATION NOTE
Lactation rounds. Reviewed positioning and latch technique and encouraged to call lactation with next feeding. Provided contact number on white board. Voices understanding.

## 2017-03-19 VITALS
WEIGHT: 150 LBS | OXYGEN SATURATION: 97 % | SYSTOLIC BLOOD PRESSURE: 124 MMHG | BODY MASS INDEX: 26.58 KG/M2 | HEART RATE: 93 BPM | TEMPERATURE: 98 F | DIASTOLIC BLOOD PRESSURE: 81 MMHG | HEIGHT: 63 IN | RESPIRATION RATE: 18 BRPM

## 2017-03-19 PROCEDURE — 25000003 PHARM REV CODE 250: Performed by: OBSTETRICS & GYNECOLOGY

## 2017-03-19 RX ORDER — IBUPROFEN 600 MG/1
600 TABLET ORAL EVERY 6 HOURS PRN
Qty: 60 TABLET | Refills: 0 | Status: SHIPPED | OUTPATIENT
Start: 2017-03-19 | End: 2017-05-18

## 2017-03-19 RX ORDER — POLYETHYLENE GLYCOL 3350 17 G/17G
17 POWDER, FOR SOLUTION ORAL DAILY
Qty: 24 EACH | Refills: 0 | Status: SHIPPED | OUTPATIENT
Start: 2017-03-19 | End: 2017-04-12 | Stop reason: ALTCHOICE

## 2017-03-19 RX ADMIN — POLYETHYLENE GLYCOL 3350 17 G: 17 POWDER, FOR SOLUTION ORAL at 09:03

## 2017-03-19 NOTE — PROGRESS NOTES
D/C instructions given to patient and spouse.  Questions answered. No distress noted.  Discharged home, transported to [facility] via escort.

## 2017-03-19 NOTE — PROGRESS NOTES
"POSTPARTUM PROGRESS NOTE     Earlene Nassar is a 26 y.o. female PPD #2 status post Spontaneous vaginal delivery at 39w0d in a pregnancy complicated by 4th degree laceration & repair. Patient is doing well this morning. She denies nausea, vomiting, fever or chills.  Patient reports mild abdominal pain that is well relieved by oral pain medications, she has only mild laceration pain & has only taken 1 Percocet. Lochia is mild to moderate  and decreasing. Patient is voiding without difficulty and ambulating with no difficulty. She has passed flatus, and has not had BM.  Patient does plan to breast feed; and her milk has "come in" but she is having some cracking of the nipples & baby has lost too much weight and needed supplementation.    Objective:       Temp:  [98 °F (36.7 °C)-98.1 °F (36.7 °C)] 98 °F (36.7 °C)  Pulse:  [82-94] 82  Resp:  [18] 18  BP: (127-144)/(79-89) 127/79    General:   alert, appears stated age and cooperative   Lungs:   clear to auscultation bilaterally   Heart:   regular rate and rhythm, S1, S2 normal, no murmur, click, rub or gallop   Abdomen:  soft, non-tender; bowel sounds normal; no masses,  no organomegaly   Uterus:  firm located below the umblicus.    Breasts Firm; nonengorged; nontender; linear cracking on both nipples; no signs of infection; nipples everted   Perineum: Sutures clean, dry and intact   Extremities: pedal edema trace;  +2 pulses     Lab Review  No results found for this or any previous visit (from the past 4 hour(s)).    I/O  No intake or output data in the 24 hours ending 03/19/17 0936     Assessment:     Patient Active Problem List   Diagnosis    Pregnancy    Normal vaginal delivery    Obstetric vaginal laceration, delivered, current hospitalization        Plan:   1. Postpartum care:  - Patient doing well. Continue routine management and advances.  - Continue PO pain meds. Pain well controlled.  - Heme: H/h 10/29.7 showing moderate anemia due to intrapartum blood " loss. The patient is asymptomatic of the anemia - no intervention is indicated    - Encourage ambulation  - Circumcision done  - Lactation consulted      2. 4th degree perineal laceration with repair - healing well; no sx of infection; patient has not had BM yet - encouraged to continue Miralax & stool softener and increased fiber & water daily      Dispo: As patient meets milestones, will plan to discharge home today.    Olinda Beckwith

## 2017-03-19 NOTE — DISCHARGE SUMMARY
Delivery Discharge Summary  Obstetrics      Primary OB Clinician: [unfilled]    Admission date: 3/17/2017  Discharge date: 2017    Disposition: To home, self care    Admit Dx:      Patient Active Problem List   Diagnosis    Pregnancy    Normal vaginal delivery    Obstetric vaginal laceration, delivered, current hospitalization     Discharge Dx:    Patient Active Problem List   Diagnosis    Pregnancy    Normal vaginal delivery    Obstetric vaginal laceration, delivered, current hospitalization       Procedure:  with episiotomy & 4th degree perineal laceration    Hospital Course:  Earlene Nassar is a 26 y.o. now , PPD #2 who was admitted on 3/17/2017 at 39 0/7 wga for induction. On initial assessment, vital signs were stable and physical exam was normal. Infant was in cephalic presentation. Patient was subsequently admitted to labor and delivery unit with signed consents. Labor course was managed with pitocin augmentation & AROM.  Patient delivered a single viable  male. Please see delivery note for further details. Pt was in stable condition post delivery and was transferred to the Mother-Baby Unit. Her postpartum course was uncomplicated. On discharge day, patient's pain is controlled with oral pain medications. Pt is tolerating ambulation without SOB or CP, and PO diet without N/V. Reports lochia is mild. Denies any HA, vision changes, F/C, LE swelling. Denies any breast pain/soreness.  Pt in stable condition and ready for discharge. She has been instructed to continue as indicated as well as pain medications as needed and to follow up in the OB clinic in 3 & 6 weeks with her obstetrics provider.    Pertinent studies:  Postpartum CBC  Lab Results   Component Value Date    WBC 18.87 (H) 2017    HGB 10.0 (L) 2017    HCT 29.7 (L) 2017    MCV 92 2017     2017         Tubal Ligation: n/a  Feeding Method: both breast and bottle  Rh Immune Globulin  Given(A POS): N/A  Rubella Vaccine Given: N/A  Tdap Vaccine Given: N/A    Delivery:    Episiotomy: Median   Lacerations: 4th   Repair suture:     Repair # of packets: 5   Blood loss (ml): 690     Birth information:  YOB: 2015   Time of birth: 9:15 AM   Sex: male   Delivery type: Vaginal, Spontaneous Delivery   Gestational Age: 39w0d    Delivery Clinician:      Other providers:       Additional  information:  Forceps:    Vacuum:    Breech:    Observed anomalies      Living?:           APGARS  One minute Five minutes Ten minutes   Skin color:         Heart rate:         Grimace:         Muscle tone:         Breathing:         Totals: 9  9        Placenta: Delivered:       appearance      Patient Instructions:   Current Discharge Medication List      START taking these medications    Details   ibuprofen (ADVIL,MOTRIN) 600 MG tablet Take 1 tablet (600 mg total) by mouth every 6 (six) hours as needed for Pain (cramping).  Qty: 60 tablet, Refills: 0      polyethylene glycol (GLYCOLAX) 17 gram PwPk Take 17 g by mouth once daily.  Qty: 24 each, Refills: 0         CONTINUE these medications which have NOT CHANGED    Details   oxycodone-acetaminophen (PERCOCET) 5-325 mg per tablet Take 1 tablet by mouth every 4 (four) hours as needed for Pain.  Qty: 30 tablet, Refills: 0      prenatal #108-iron,carbonyl-FA 30-1 mg Tab Take by mouth once daily.               Discharge Procedure Orders  Diet general     Activity as tolerated     Other restrictions (specify):   Order Comments: Pelvic rest for 6 weeks.  No heavy lifting great than 30 lbs for 6 week.  No driving for at least 2 weeks, or as long as patient is taking Narcotics.   No baths as long as having vaginal bleeding.     Call MD for:  temperature >100.4     Call MD for:  persistent nausea and vomiting or diarrhea     Call MD for:  severe uncontrolled pain     Call MD for:  redness, tenderness, or signs of infection (pain, swelling, redness, odor or green/yellow  discharge around incision site)     Call MD for:  difficulty breathing or increased cough     Call MD for:  severe persistent headache     Call MD for:  persistent dizziness, light-headedness, or visual disturbances     No dressing needed         Olinda Beckwith

## 2017-03-19 NOTE — LACTATION NOTE
Discharge instructions reviewed with mother, including contact numbers and available resources. Due to concerns regarding infant's weight loss, borderline jaundice levels, and lack of void/stool since yesterday afternoon, encouraged frequent feedings as often as able. Also recommended waking for feedings at least every 3 hours. Feeding observed and mother independently latched baby to both breasts comfortably, with nutritive suckling and audible swallowing observed. Stressed to offer both breasts with every feeding, alternating starting breast. Reviewed what to expect as milk is coming in, how to tell baby is getting enough, manual expression of breastmilk, cue based feeding on demand, skin to skin, etc. Encouraged to call with any questions or concerns. Mother voices understanding.     03/19/17 1100   Maternal Infant Assessment   Breast Density filling;soft   Areola elastic  (and bulbous when filling)   Nipple(s) everted   Infant Assessment   Sucking Reflex present   Rooting Reflex present   Swallow Reflex present   LATCH Score   Latch 2-->grasps breast, tongue down, lips flanged, rhythmic sucking   Audible Swallowing 2-->spontaneous and intermittent (24 hrs old)   Type Of Nipple 2-->everted (after stimulation)   Comfort (Breast/Nipple) 1-->filling, red/small blisters/bruises, mild/mod discomfort   Hold (Positioning) 2-->no assist from staff, mother able to position/hold infant   Score (less than 7 for 2/more consecutive times, consult Lactation Consultant) 9   Maternal Infant Feeding   Infant Positioning cross-cradle   Signs of Milk Transfer audible swallow;infant jaw motion present   Time Spent (min) 30-60 min   Latch Assistance no   Breastfeeding Education milk expression, hand   Feeding Infant   Audible Swallow yes   Suck/Swallow Coordination present   Lactation Interventions   Attachment Promotion breastfeeding assistance provided;counseling provided   Breastfeeding Assistance feeding cue recognition  promoted;feeding on demand promoted;feeding session observed;infant latch-on verified;infant suck/swallow verified;other (see comments)  (encouraged to wake for feedings at least every 3 hours )   Maternal Breastfeeding Support encouragement offered;lactation counseling provided   Latch Promotion positioning assisted;infant moved to breast

## 2017-03-21 ENCOUNTER — TELEPHONE (OUTPATIENT)
Dept: LACTATION | Facility: CLINIC | Age: 27
End: 2017-03-21

## 2017-03-21 NOTE — TELEPHONE ENCOUNTER
"Lactation Note: Mother reports her milk is in and baby is nursing well from both breasts every 2 hours. He is having a wet and soiled diaper at every feeding and they are yellow seedy and loose medium to large in size. Her nipples are healing and have scabs and "do not hurt". Mother took baby to pediatrician at [facility] yesterday at 3 days of age and the baby weighed 6-14 nude. She is taking the baby back for a weight check in a week next Monday. Verbalized no questions or concerns. Has warm line number. Instructed to call warm line if there is any question or concern with his weight after follow up.   "

## 2017-03-25 ENCOUNTER — TELEPHONE (OUTPATIENT)
Dept: LACTATION | Facility: CLINIC | Age: 27
End: 2017-03-25

## 2017-03-25 NOTE — TELEPHONE ENCOUNTER
Lactation note: pt called warmline to discuss overactive letdown. Baby's weight at peds office was 7#4oz up 6 oz from Monday.suggested to pt to nurse baby one breast per feeding and call lc if she needs more questions.

## 2017-03-27 ENCOUNTER — TELEPHONE (OUTPATIENT)
Dept: OBSTETRICS AND GYNECOLOGY | Facility: CLINIC | Age: 27
End: 2017-03-27

## 2017-03-27 NOTE — TELEPHONE ENCOUNTER
[facility] calling, states that they need the pt's date of delivery and type of delivery for this patient. Please call [phone] ext. [phone]

## 2017-03-30 ENCOUNTER — POSTPARTUM VISIT (OUTPATIENT)
Dept: OBSTETRICS AND GYNECOLOGY | Facility: CLINIC | Age: 27
End: 2017-03-30
Payer: COMMERCIAL

## 2017-03-30 ENCOUNTER — TELEPHONE (OUTPATIENT)
Dept: OBSTETRICS AND GYNECOLOGY | Facility: CLINIC | Age: 27
End: 2017-03-30

## 2017-03-30 VITALS
HEIGHT: 63 IN | BODY MASS INDEX: 23.2 KG/M2 | SYSTOLIC BLOOD PRESSURE: 116 MMHG | WEIGHT: 130.94 LBS | DIASTOLIC BLOOD PRESSURE: 94 MMHG

## 2017-03-30 DIAGNOSIS — B37.2 SKIN YEAST INFECTION: Primary | ICD-10-CM

## 2017-03-30 DIAGNOSIS — Z91.89 BREASTFEEDING PROBLEM: ICD-10-CM

## 2017-03-30 PROCEDURE — 99999 PR PBB SHADOW E&M-EST. PATIENT-LVL III: CPT | Mod: PBBFAC,,, | Performed by: NURSE PRACTITIONER

## 2017-03-30 PROCEDURE — 99213 OFFICE O/P EST LOW 20 MIN: CPT | Mod: 24,S$GLB,, | Performed by: NURSE PRACTITIONER

## 2017-03-30 RX ORDER — FLUCONAZOLE 150 MG/1
150 TABLET ORAL ONCE
Qty: 1 TABLET | Refills: 0 | Status: SHIPPED | OUTPATIENT
Start: 2017-03-30 | End: 2017-03-30

## 2017-03-30 NOTE — PROGRESS NOTES
"Chief Complaint: breast itching and red spots      HPI: 26 y.o. about 2 wks pp of vaginal delivery with 4th degree lac, reports breast itching and more on the left breast and noticed some red spots on this breast. She is currently breastfeeding and pumping breasts but reports difficulty with baby staying latched, baby is not sleeping well and crying, did loose weight was ~6.12 and birth weight was 7.8lb. Currently 7.7lb. She reports feeding baby for 10 minutes on the left and then he pulls away and then tries to stimulate to eat more but he doesn't always eat more. She has concerns about baby and his weight and also about the breast. No erythema/fever/chills/n/v/warmth at breast/lump at breast or engorgement, no sharp shooting pain. No nipple pain    Pt hasn't taken percocet since leaving hospital and also reports bleeding is decreasing      ROS   Systemic: Not feeling tired (fatigue).  No fever chills   Gastrointestinal: No nausea, vomiting, no abdominal pain.  No diarrhea.  Genitourinary: No dysuria. No Pelvic Pain  Skin: No rash.  BP (!) 116/94  Ht 5' 3" (1.6 m)  Wt 59.4 kg (130 lb 15.3 oz)  LMP 06/17/2016 (Exact Date)  BMI 23.2 kg/m2      Physical Exam  Vital Signs: ° Normal.  General Appearance: ° well developed.  ° Well nourished.  Neck: °Symmetrical °Trachea appearance mid-line  Eyes: °Extra-ocular movements normal   Respiratory: °No respiratory distress noted, °no use of accessory muscles  Right Breast:  Appearance of the breast was normal.  ° Palpation of the breast revealed no abnormalities. No nipple discharge, no rashes, no dimpling, no LAD, no dimpling, no tenderness   Left Breast: Appearance of the breast was normal.  ° Palpation of the breast revealed no abnormalities. + milky nipple discharge, +small area of red satellite lesions at 3 and at 10 o'clock., no dimpling, no LAD, no dimpling, no tenderness   Psychiatric: Affect: ° Normal.  Neurological: ° No disorientation   Skin: °No lesions " noted          Plan:  1. Left breast skin yeast-- diflucan and claritn, discussed prevention measures, discussed mother love to nipple if needed. Discussed s/s of mastitis and breast yeast as well. Keep area dry and fee of moisture    2. Breastfeeding-- discussed options with patient and strategies. Baby is 2 wks tomorrow and not at baby weight. Discussed pumping to check amt baby is getting, also discussed supplementation right now and discuss with Pedarianne MD. Education provided today and questions answered.     RTC as scheduled next week

## 2017-03-30 NOTE — MR AVS SNAPSHOT
[facility]  [address], [address]  [facility] [address]  Phone: [phone]  Fax: [phone]                  Earlene Nassar   3/30/2017 2:00 PM   Postpartum Visit    Description:  Female : 1990   Provider:  Yas Pruitt NP   Department:  [facility]           Reason for Visit     itchy breast                To Do List           Future Appointments        Provider Department Dept Phone    2017 1:45 PM Martin Sky MD [facility] [phone]    2017 9:45 AM Martin Sky MD [facility] [phone]      Goals (5 Years of Data)     None      [facility]     [facility] Care [facility] -  Assistance  Unless otherwise directed by your provider, please contact [facility], our nurse care line that is available for  assistance.     Registered nurses in the [facility] On Call Center provide: appointment scheduling, clinical advisement, health education, and other advisory services.  Call: [phone] (toll free)               Medications           Message regarding Medications     Verify the changes and/or additions to your medication regime listed below are the same as discussed with your clinician today.  If any of these changes or additions are incorrect, please notify your healthcare provider.             Verify that the below list of medications is an accurate representation of the medications you are currently taking.  If none reported, the list may be blank. If incorrect, please contact your healthcare provider. Carry this list with you in case of emergency.           Current Medications     ibuprofen (ADVIL,MOTRIN) 600 MG tablet Take 1 tablet (600 mg total) by mouth every 6 (six) hours as needed for Pain (cramping).    oxycodone-acetaminophen (PERCOCET) 5-325 mg per tablet Take 1 tablet by mouth every 4 (four) hours as needed for Pain.    polyethylene glycol (GLYCOLAX) 17 gram PwPk Take 17 g by mouth  once daily.    prenatal #108-iron,carbonyl-FA 30-1 mg Tab Take by mouth once daily.           Clinical Reference Information           Prenatal Vitals     Enc. Date GA Prenatal Vitals Prenatal Pulse Pain Level Urine Albumin/Glucose Edema Presentation Dilation/Effacement/Station    3/30/17 39w0d 116/94 (A) / 59.4 kg (130 lb 15.3 oz)           3/17/17 39w0d Admission Dept: [facility] MOMBABY    3/15/17 38w5d 118/78 / 68.3 kg (150 lb 11 oz)  / 150 / Present  0 Negative / Negative None / None / None  5 / 90    3/9/17 37w6d 122/84 / 67.8 kg (149 lb 7.9 oz) 37 cm / 145 / Present  0 Negative / Negative None / None / None Vertex 5 / 90 / -2    3/3/17 37w0d 114/82 / 67 kg (147 lb 13.1 oz) 37 cm / 145 / Present   Negative / Negative None / None / None Vertex 4.5 / 90 / -2    2/22/17 35w5d 120/82 / 65.6 kg (144 lb 11.7 oz) 36 cm / 152 / Present   Negative / Negative None / None / None Vertex 4 / 90 / -2    2/18/17 35w1d Admission Dept: [facility] OB ER    2/17/17 35w0d 118/80 / 64.5 kg (142 lb 3.2 oz) 34 cm / 142 / Present   Negative / Negative None / None / None Vertex 4 / 90 / -2    2/13/17 34w3d 120/90 (A) / 63.6 kg (140 lb 5.2 oz)  /  / Present   Negative / Negative None / None / None  1    2/7/17 33w4d 120/82 / 64.1 kg (141 lb 5 oz) 33 cm / 148 / Present   Negative / Negative None / None / None  1    2/3/17 33w0d 110/76 / 64.8 kg (142 lb 15.5 oz) 33 cm / 142 / Present   Negative / Negative None / None / None      1/20/17 31w0d 110/70 / 62 kg (136 lb 11 oz) 31 cm / 148 / Present   Negative / Negative None / None / None      1/6/17 29w0d 118/76 / 60.1 kg (132 lb 6.2 oz) 28 cm / 133 / Present   Negative / Negative None / None / None      12/2/16 24w0d 110/68 / 56.1 kg (123 lb 10.9 oz) 24 cm / 132 / Present   Negative / Negative None / None / None      11/4/16 20w0d 112/70 / 53.4 kg (117 lb 9.9 oz) 20 cm / 148 / Present   Negative / Negative None / None / None      10/6/16 15w6d 104/62 / 51 kg (112 lb 5.2 oz)  / 156 / Absent   Negative /  "Negative None / None / None      9/6/16 11w4d 116/78  /  / Absent   Negative / Negative None / None / None      8/19/16 7w6d 116/76 / 47.8 kg (105 lb 6.1 oz)              Number of babies: 1   Height: 5' 3" (1.6 m)       Your Vitals Were     BP Height Weight Last Period BMI    116/94 5' 3" (1.6 m) 59.4 kg (130 lb 15.3 oz) 06/17/2016 (Exact Date) 23.2 kg/m2      Allergies as of 3/30/2017     Codeine      Immunizations Administered on Date of Encounter - 3/30/2017     None      Language Assistance Services     ATTENTION: Language assistance services are available, free of charge. Please call [phone].      ATENCIÓN: Si roque hinds, tiene a peres disposición servicios gratuitos de asistencia lingüística. Llame al [phone].     RAMA Ý: N?u b?n nói Ti?ng Vi?t, có các d?ch v? h? tr? ngôn ng? mi?n phí dành cho b?n. G?i s? [phone].         [facility] complies with applicable Federal civil rights laws and does not discriminate on the basis of race, color, national origin, age, disability, or sex.        "

## 2017-03-30 NOTE — PATIENT INSTRUCTIONS
Post-Partum Education:  1. Skin and Muscles--During the course of pregnancy, her skin and muscles stretched over her expanding abdomen. After pregnancy, it may take time for the skin to shrink to its pre-pregnancy state and for her abdominal muscles to become strong again. At 6-8 weeks core-strengthening exercises can help in recovery of her abdominal muscles.   2. Weight--Staying hydrated, good nutrition and adequate sleep are vital to losing weight and aid in feeling well.   3. Nutrition and Breastfeeding--If you are breast feeding it is advised to continue a daily prenatal vitamin, eat small meals with snacks which have a balance of protein, fluids, veggies, fruits and other complex carbohydrates.   4. Menstrual periods--A monthly cycle may not happen right away. If you are breast-feeding, you probably will not have a period for that first few months. If you are bottle feeding, the onset of menstruation is usually between 6-12 weeks after delivery. It is possible to get pregnant even if you are not menstruating so contraception is advised. The first period is usually heavier and more erratic than your normal period and may be associated with clots.   5. Incontinence--Urinary stress incontinence or the inability to fully empty her bladder after delivery is not uncommon after pregnancy. This is related to changes in hormones, swelling and the effect of delivery on the genitals. This should improve over time. Performing Kegel exercises and attempting to empty your bladder every two hours can help with urinary incontinence.  If the leaking of urine continues after 6 months, then she should notify the office.   6. Sexual Activity--We advise no intercourse until after your post-partum check-up. Once cleared to resume sexual intercourse you will probably require lubrication. There will be some discomfort when you reestablish sexual activity, but this should gradually diminish. It sometimes takes several months before  you are back to normal. If you are breast feeding, you might find that vaginal lubrication is a problem and this may be improved by a water soluble lubricant such as KY jelly. It is possible to get pregnant at any time after child birth, even if you have not had a menstrual period, so contraception is advised.   7. Stiches--Stitches will dissolve over a four week period and usually do not need to be removed. To aid with the healing process and help relieve discomfort we recommend: Use a spray bottle provided at the hospital to frequently clean the area.  Sitz baths or warm tub baths using approximately four inches of water in the bath tub, 2-3 per day, allowing the water to continuously run as it is slowly draining. You should continue this for approximately one week. The warmth will increase blood flow and enhances healing.  8. Vaginal discharge or lochia-- is normal and will occur for 2-3 weeks and occasionally longer. At first it will be bright red, and gradually change to pink by the second weeks and finally a yellowish discharge. It may have a slight odor and should disappear by 6 weeks. Sometimes excessive activity will cause return of the red color for several days. You may occasionally pass a blood clot. This is acceptable as long as your flow is not heavy or steady. Do not douche or use tampons until you come in for your 6 week check-up as this could cause an infection. If you feel your discharge is too heavy, please call us.  9. Sleep--It is important to get enough sleep. Lack of sleep can lead to depression, anxiety, unhappiness, inability to think, lack of judgment and more. Ask and accept help when needed.  10. Post-Partum Depression--is real and if symptoms develop you should notify your health care provider. Patients can experience baby-blues after delivery. This is related to hormonal changes and life adjustments after a baby. You may feel tearful at times. Baby Blues usually doesnt last past  6 weeks post-partum. When symptoms are more severe/not getting better/past 6 weeks concerns for post-partum depression should be evaluated. Symptoms of postpartum depression include: Feeling sad, anxious or empty, Having panic attacks, Difficulty feeling close to your baby or feeling overly involved with everything connected to your baby, Lack of energy, feeling very tired, Lack of interest in normal activities, Isolating yourself, Changes in sleeping or eating patterns, Feeling hopeless, helpless, guilty or worthless, Feeling gray or irritable, Problems concentrating or making simple decisions, Thoughts about hurting yourself or your baby, even if you will not act on them, Thoughts about death or suicide.   11. Bathing--You only shower for the first 4-6 weeks. Thereafter baths and swimming are acceptable.   12. Travel--It is advised no long travel via auto or plane in the first two weeks.   13. Driving--Avoid driving if you are taking any narcotics, otherwise, you may begin to drive after the second or fourth week depending on how you feel and clearance by your provider. Prior to this your leg reflexes are poor. Of course, when you do first drive it should be for short distances. If you had a  section make sure you have someone with you when you first drive.   14. Hemorrhoids--Hemorrhoids that appear for the first time late in pregnancy or as a result of delivery will usually get better and disappear. They may be compounded by constipation which is not uncommon during the first few weeks. Both problems are usually overcome by reestablishing good diets including 6-8 glasses of water per day, citrus fruits, roughage in your diet (celery, lettuce, greens, etc.) and if needed stool softeners (Colace, Senokot). If needed a mild laxative (milk of magnesia or Pericolace) is acceptable. Avoid straining with stools. If your hemorrhoids are very painful you should try sitz baths and local anesthetics as discussed  for care for stitches. Ice packs are also helpful in reducing swelling and pain.   15. BREASTS (IF YOU ARE BREAST-FEEDING)-- Cleanse your nipples with warm water only. Wear a bra with good support. To help prevent sore nipples, make sure the baby is properly positioned on the breast. The babys mouth should cover the nipple as much as possible. You should change the position of the baby with each nursing so the stress will be rotated to different parts of the nipple. In the early days of breast feeding, you should air dry your nipples for ten minutes after each nursing session.   16. Cracked Nipples--If your nipples are extremely sore, you may apply ice or ice water to the nipple for a few minutes before nursing to reduce the immediate pain when your baby first grasps the breast. You can also do this by taking a warm shower or applying warm compresses to the breast and then manually squeezing out some milk. This is also beneficial in preventing or relieving any engorgement (fullness) of the breasts. Engorgement often occurs 2-5 days after delivery when the milk comes in. Frequent nursing and varying the babys position on the breast are probably the most helpful things a breast-feeding mother can do to relieve engorgement.  17. Medications--Most medications which are permissible to take when you are pregnant are also permissible when you are breast-feeding (i.e. Tylenol), however you should call if you have questions.   18. BREASTS (IF YOU ARE NOT BREAST-FEEDING)--Breast milk production may still occur in 2-5 days after delivery. In order to alleviate discomfort from engorgement, keep your breasts well supported with a tight bra, apply ice packs to your breasts, take Tylenol or Advil for the discomfort.The fullness and discomfort will usually last 3-4 days. Breast engorgement which is associated with redness, tenderness, fever or cracked nipples could be a sign of breast infection (mastitis). Contact our office as  soon as possible for evaluation.   19. Swelling--Swelling of the legs may actually worsen when you get home. This is caused by the administration of large amounts of intravenous fluids you receive in the hospital and increased standing once you are discharged from the hospital. In most cases it disappears within the first couple of weeks. If you develop pain, redness, numbness in your leg/calf(s) or have concerns and swelling not better contact your health care provider.  20. When to Call--We are always available to you, but please call us when your problem begins. Remember, problems get worse at night. CALL our office if you have: Severe chills and/or fever greater than 100.5 degrees, Excessively heavy or prolonged vaginal bleeding, Frequency, burning or blood in your urine, Fainting, Swelling, redness or tenderness in one area of a breast, If you are experiencing excessive anxiety or troubled thoughts   21.  Section Mothers--In the hospital you should get up and walk as soon as possible. The nurses in the hospital will have you up within 24 hours. Your I.V. and catheter tubes will also be taken out at this time. It wont be easy at first but movement is important for your recovery. Your objective is to go home within 3 days. Listen to your body. It will tell you when to stop and when to go. You can breast-feed successfully. It may take time and patience on your part. A good way to breast-feed your baby is to lie on your side, since you incision may be tender. Another way is holding the baby football style, cradled under one arm, as a running back holds the football when he runs or, hold the child in your lap using pillows to protect your incision. Whatever way you choose to breast-feed you can and will have a normal relationship with your baby. Keep the incision clean with soap and water. No bandage is necessary unless the incision is draining. Do not rub on the incision after showering, just pat it dry.  If the incision appears swollen, red or is draining, please let us know. You should be seen by a provider 2 weeks after a  for an incision check

## 2017-04-07 ENCOUNTER — TELEPHONE (OUTPATIENT)
Dept: UROGYNECOLOGY | Facility: CLINIC | Age: 27
End: 2017-04-07

## 2017-04-07 ENCOUNTER — POSTPARTUM VISIT (OUTPATIENT)
Dept: OBSTETRICS AND GYNECOLOGY | Facility: CLINIC | Age: 27
End: 2017-04-07
Attending: OBSTETRICS & GYNECOLOGY
Payer: COMMERCIAL

## 2017-04-07 VITALS
SYSTOLIC BLOOD PRESSURE: 124 MMHG | WEIGHT: 128.88 LBS | DIASTOLIC BLOOD PRESSURE: 82 MMHG | HEIGHT: 63 IN | BODY MASS INDEX: 22.84 KG/M2

## 2017-04-07 DIAGNOSIS — R15.9 INCONTINENCE OF FECES, UNSPECIFIED FECAL INCONTINENCE TYPE: ICD-10-CM

## 2017-04-07 PROCEDURE — 99999 PR PBB SHADOW E&M-EST. PATIENT-LVL III: CPT | Mod: PBBFAC,,, | Performed by: OBSTETRICS & GYNECOLOGY

## 2017-04-07 PROCEDURE — 99499 UNLISTED E&M SERVICE: CPT | Mod: S$GLB,,, | Performed by: OBSTETRICS & GYNECOLOGY

## 2017-04-07 NOTE — TELEPHONE ENCOUNTER
Spoke to pt and scheduled her for Dr. Santos next available and informed her I'd send an appointment letter in the mail. Pt voiced understanding and call ended.

## 2017-04-07 NOTE — TELEPHONE ENCOUNTER
----- Message from Laney Barrera MA sent at 4/7/2017  3:22 PM CDT -----  Wax pt for consult to Dr. Alatorre for fecal incontinence and 4th degree laceration from delivery. Orders entered for consult, please call pt to schedule. Thanks

## 2017-04-10 ENCOUNTER — TELEPHONE (OUTPATIENT)
Dept: UROGYNECOLOGY | Facility: CLINIC | Age: 27
End: 2017-04-10

## 2017-04-10 NOTE — TELEPHONE ENCOUNTER
Dr. Sky's pt calling, pt states that she is experiencing fever of 101 degrees, sneezing, cough, and running nose. Pt is breast feeding and would like to know what she can take. Please call pt at [phone].

## 2017-04-10 NOTE — TELEPHONE ENCOUNTER
Wax post partum pt said that she is having sinus symptoms and running a fever of 101. Asking if Dr. Sky could possibly call her in a zpack because she just moved to the [facility] and her pcp is still on the [facility]. Allergies and pharmacy are up to date

## 2017-04-10 NOTE — TELEPHONE ENCOUNTER
----- Message from Kamala Alatorre MD sent at 2017  7:28 PM CDT -----  Can we please call patient and help her make appt with Dr. Tabitha SPENCER?  Alice, hope that sounds ok?  Thanks!  ----- Message -----     From: Martin Sky MD     Sent: 2017   6:01 PM       To: MD Rocael Harrison,  This patient is 3 weeks out of  with 4th degree laceration repair.  Initially I put her on miralax daily or every other day so that she wasn't having any hard BM so she can heal.  I saw her today, she stopped the miralax 4 days ago, her exam she is healing well and even has good tone on rectal exam, HOWEVER she states that she is having fecal incontinence when she has the urge and sometimes has small accident of soft stool.  I told her to stop the miralax and that I will get her in with you and she may need pelvic floor PT.  What is your recs and can I get her in with you soon?  Thank you!  Martin

## 2017-04-12 ENCOUNTER — INITIAL CONSULT (OUTPATIENT)
Dept: UROGYNECOLOGY | Facility: CLINIC | Age: 27
End: 2017-04-12
Payer: COMMERCIAL

## 2017-04-12 VITALS
DIASTOLIC BLOOD PRESSURE: 60 MMHG | SYSTOLIC BLOOD PRESSURE: 108 MMHG | HEIGHT: 63 IN | WEIGHT: 128.5 LBS | BODY MASS INDEX: 22.77 KG/M2

## 2017-04-12 DIAGNOSIS — R15.2 FECAL URGENCY: ICD-10-CM

## 2017-04-12 PROCEDURE — 1160F RVW MEDS BY RX/DR IN RCRD: CPT | Mod: S$GLB,,, | Performed by: OBSTETRICS & GYNECOLOGY

## 2017-04-12 PROCEDURE — 99999 PR PBB SHADOW E&M-EST. PATIENT-LVL III: CPT | Mod: PBBFAC,,, | Performed by: OBSTETRICS & GYNECOLOGY

## 2017-04-12 PROCEDURE — 99204 OFFICE O/P NEW MOD 45 MIN: CPT | Mod: S$GLB,,, | Performed by: OBSTETRICS & GYNECOLOGY

## 2017-04-12 RX ORDER — KETOROLAC TROMETHAMINE 10 MG/1
10 TABLET, FILM COATED ORAL EVERY 6 HOURS
Qty: 20 TABLET | Refills: 0 | Status: SHIPPED | OUTPATIENT
Start: 2017-04-12 | End: 2017-04-12 | Stop reason: SDUPTHER

## 2017-04-12 RX ORDER — KETOROLAC TROMETHAMINE 10 MG/1
10 TABLET, FILM COATED ORAL EVERY 6 HOURS
Qty: 20 TABLET | Refills: 0 | Status: SHIPPED | OUTPATIENT
Start: 2017-04-12 | End: 2018-05-31

## 2017-04-12 RX ORDER — DOCUSATE SODIUM 100 MG/1
100 CAPSULE, LIQUID FILLED ORAL 2 TIMES DAILY
Qty: 60 CAPSULE | Refills: 2 | Status: SHIPPED | OUTPATIENT
Start: 2017-04-12 | End: 2017-08-24

## 2017-04-12 RX ORDER — AZITHROMYCIN 250 MG/1
TABLET, FILM COATED ORAL
Qty: 6 TABLET | Refills: 0 | Status: SHIPPED | OUTPATIENT
Start: 2017-04-12 | End: 2017-04-15

## 2017-04-12 NOTE — PROGRESS NOTES
Subjective:       Patient ID: Earlene Nassar is a 26 y.o. female.    Chief Complaint:  Consult (4 weeks post partum c fecal incontinence)      History of Present Illness: 27yo P1 who is 4 weeks postpartum from  complicated by episiotomy & 4th degree laceration. After started taking miralax once per day for prevention, while taking that she had urgency with fecal incontinence with soft stool every other day. She stopped miralax 5 days ago, states her BM are now more formed and hard with incomplete emptying. Since stopping miralax denies fecal incontinence or smearing, but still complains of urgency. Denies splinting. She is not currently taking any stool softeners or fiber. Denies gas incontinence. Pain is well controlled with scheduled ibuprofen q6h. Denies vaginal discharge or drainage from tear. Denies stress or urge urinary incontinence. She was referred by Dr. Sky for evaluation.     Note from delivery summary: Perineum inspected 4th degree midline laceration extending less than 1cm deep into mucosa. Rectal mucosa repaired with running stitch 4-0 vicryl. External and internal anal sphincter repaired using 0 vicryl in an overlapping fashion. Then 2nd degree laceration with succal laceration repaired with 2-0 vicryl.    HPI      [facility] Peq Urogyn Hpi      Question    4/10/2017  3:02 PM CDT     General Urogynecology: Are you experiencing the following?       Dysuria (painful urination)  No     Nocturia:  waking up at night to empty your bladder   No     If you answered yes to the previous question, how many times does this happen per night?  N/a- I answered no to the previous question     Enuresis (urine loss during sleep)  No     Dribbling urine after you urinate  No     Hematuria (urine appears red)  No     Type of stream  Weak     Urinary Incontinence (General): Are you experiencing the following?       Past consultation for incontinence: Have you ever seen someone for the evaluation of incontinence?  No  "    If you answered yes to the previous question, please select all the therapies you have tried.   N/a- I answered no to the previous question     Please note the effectiveness of the therapies.       Need to wear protection to keep clothes dry   No     If you answered yes to the previous question, please beau the protection you use.   N/a- I answered no to the previous question     If you wear protection, how much wetness is typically on each pad?  N/a- I do not wear protection     If you wear protection, how often do you have to change per day, if applicable?   0     Stress Symptoms: Are you experiencing the following?       Leakage of urine with cough, laugh and/or sneeze  No     If you answered yes to the previous question, what is the frequency in days, weeks and/or months?  Never     Leakage of urine with sex  No     Leakage of urine with bending/ lifting  No     Leakage of urine with briskly walking or jogging  No     If you lose urine for any other reason not previously mentioned, please note it below, if applicable.        Urge Symptoms: Are you experiencing the following?       Urgency ("got to go" feeling)  No     Urge: How frequently do you feel an urge to urinate (feeling like you "gotta go" to the bathroom and can't wait)  Never     Do you experience a leakage of urine when you have a feeling of urgency?   No     Leakage of urine when unaware  No     Past use of anticholinergics (medications used to treat overactive bladder)  No     If you answered yes to the previous question, please beau the anticholinergics you have used:        Have you ever used Mirbetriq (aka Mirabegron)?   No     Prolapse Symptoms: Are you experiencing any of the following?        Falling out/ Bulging/ Heaviness in the vagina  No     Vaginal/ Abdominal Pain/ Pressure  Yes     Need to strain/ Push to void  No     Need to wait on the toilet before you void  No     Unusual position to urinate (using your hands to push back the " vaginal bulge)  No     Sensation of incomplete emptying  No     Past use of pessary device  No     If you answered yes to the previous question, please list the devices you have used below.        Bowel Symptoms: Are you experiencing any of the following?       Constipation  No     Diarrhea   No     Hematochezia (bloody stool)  No     Incomplete evacuation of stool  Yes     Involuntary loss of formed stool  No     Fecal smearing/urgency  Yes     Involuntary loss of gas  No     Vaginal Symptoms: Are you experiencing any of the following?        Abnormal vaginal bleeding   No     Vaginal dryness  No     Sexually active   No     Dyspareunia (painful intercourse)  No     Estrogen use   No         History reviewed. No pertinent past medical history.      GYN & OB History  Patient's last menstrual period was 2016 (exact date).   Date of Last Pap: No result found    OB History    Para Term  AB SAB TAB Ectopic Multiple Living   1 1 1      0 1      # Outcome Date GA Lbr Andrew/2nd Weight Sex Delivery Anes PTL Lv   1 Term 17 39w0d  3.402 kg (7 lb 8 oz) M Vag-Spont EPI N Y      Obstetric Comments   Menarche ~14     Past Surgical History:   Procedure Laterality Date    ADENOIDECTOMY      TONSILLECTOMY         Current Outpatient Prescriptions:     ibuprofen (ADVIL,MOTRIN) 600 MG tablet, Take 1 tablet (600 mg total) by mouth every 6 (six) hours as needed for Pain (cramping)., Disp: 60 tablet, Rfl: 0    azithromycin (Z-ANGEL) 250 MG tablet, Take 2 tablets by mouth on day 1; Take 1 tablet by mouth on days 2-5, Disp: 6 tablet, Rfl: 0    docusate sodium (COLACE) 100 MG capsule, Take 1 capsule (100 mg total) by mouth 2 (two) times daily., Disp: 60 capsule, Rfl: 2    ketorolac (TORADOL) 10 mg tablet, Take 1 tablet (10 mg total) by mouth every 6 (six) hours., Disp: 20 tablet, Rfl: 0    oxycodone-acetaminophen (PERCOCET) 5-325 mg per tablet, Take 1 tablet by mouth every 4 (four) hours as needed for Pain.,  Disp: 30 tablet, Rfl: 0    Review of patient's allergies indicates:   Allergen Reactions    Codeine Other (See Comments)     Per pt's mother she had rash as a child but took narcotics after Tonsillectomy without problems         Review of Systems  Review of Systems   Constitutional: Negative.  Negative for activity change, appetite change, chills, diaphoresis, fatigue, fever and unexpected weight change.   HENT: Negative.    Eyes: Negative.    Respiratory: Negative.  Negative for apnea, cough and wheezing.    Cardiovascular: Negative.  Negative for chest pain and palpitations.   Gastrointestinal: Positive for constipation and rectal pain. Negative for abdominal distention, abdominal pain, anal bleeding, blood in stool, diarrhea, nausea and vomiting.        Fecal urgency/incontinence with soft stool   Endocrine: Negative.    Genitourinary: Positive for vaginal bleeding, vaginal discharge (lochia) and vaginal pain. Negative for decreased urine volume, difficulty urinating, dyspareunia, dysuria, enuresis, flank pain, frequency, genital sores, hematuria, menstrual problem, pelvic pain and urgency.   Musculoskeletal: Negative for back pain and gait problem.   Skin: Negative for color change, pallor, rash and wound.   Allergic/Immunologic: Negative for immunocompromised state.   Neurological: Negative.  Negative for dizziness and speech difficulty.   Hematological: Negative for adenopathy.   Psychiatric/Behavioral: Negative for agitation, behavioral problems, confusion and sleep disturbance.           Objective:     Physical Exam   Constitutional: She is oriented to person, place, and time. She appears well-developed and well-nourished. No distress.   Cardiovascular: Normal rate and regular rhythm.    Pulmonary/Chest: Effort normal.   Abdominal: Soft. She exhibits no distension and no mass. There is no tenderness. There is no rebound and no guarding.   Genitourinary:   Genitourinary Comments: Posterior vaginal wall with  sutures in place, healing well, no erythema or exudate.Normal lochia present. Appropriately tender to palpation. No abnormalities on internal exam. Kegel strength 2/5. Rectal exam deferred secondary to recent 4th degree repair.   Musculoskeletal: Normal range of motion.   Neurological: She is alert and oriented to person, place, and time.   Skin: Skin is warm and dry.   Psychiatric: She has a normal mood and affect.          Assessment:        1. Obstetric vaginal laceration, delivered, current hospitalization    2. Fecal urgency             Plan:      1. 4th degree laceration w/fecal incontinence  - laceration healing well  - DC miralax  - Start colace 100mg BID  - Will consider starting fiber at next visit  - Recommend kegel exercises BID, will consider pelvic floor PT in the future once the repair is healed  - monitor for signs of infection (fever, chills, N/V, purulent discharge)  - F/U in 2 weeks     I was present and participated obtaining a history and PE with direct oversight of Dr. Smalls     Approximately  50 min were spent in consult, 90 % in discussion.     Thank you for requesting consultation of your patient.  I look forward to participating in her care.    Adilia Lu DO  Female Pelvic Medicine and Reconstructive Surgery  [facility] [place], [place]

## 2017-04-12 NOTE — MR AVS SNAPSHOT
[facility]  [address] [facility] [address]  Phone: [phone]                  Earlene Nassar   2017 2:00 PM   Initial consult    Description:  Female : 1990   Provider:  Adilia Lu DO   Department:  [facility]           Reason for Visit     Consult           Diagnoses this Visit        Comments    Obstetric vaginal laceration, delivered, current hospitalization    -  Primary            To Do List           Future Appointments        Provider Department Dept Phone    2017 9:45 AM Martin Sky MD [facility] [phone]      Goals (5 Years of Data)     None      Follow-Up and Disposition     Return in about 2 weeks (around 2017) for Follow up-4th degree/fecal incontinence.       These Medications        Disp Refills Start End    docusate sodium (COLACE) 100 MG capsule 60 capsule 2 2017     Take 1 capsule (100 mg total) by mouth 2 (two) times daily. - Oral    Pharmacy: [facility]/pharmacy #[id number] - [place], [place] - [address] [facility] Ph #: [phone]       ketorolac (TORADOL) 10 mg tablet 20 tablet 0 2017     Take 1 tablet (10 mg total) by mouth every 6 (six) hours. - Oral    Pharmacy: [facility]/pharmacy #[id number] - [place] - [address] [facility] Ph #: [phone]         [facility]     [facility] Care [facility] - 24/ Assistance  Unless otherwise directed by your provider, please contact [facility], our nurse care line that is available for 24/ assistance.     Registered nurses in the [facility] provide: appointment scheduling, clinical advisement, health education, and other advisory services.  Call: [phone] (toll free)               Medications           Message regarding Medications     Verify the changes and/or additions to your medication regime listed below are the same as discussed with your clinician today.  If any of these changes or additions are incorrect,  please notify your healthcare provider.        START taking these NEW medications        Refills    docusate sodium (COLACE) 100 MG capsule 2    Sig: Take 1 capsule (100 mg total) by mouth 2 (two) times daily.    Class: Normal    Route: Oral    ketorolac (TORADOL) 10 mg tablet 0    Sig: Take 1 tablet (10 mg total) by mouth every 6 (six) hours.    Class: Print    Route: Oral      STOP taking these medications     polyethylene glycol (GLYCOLAX) 17 gram PwPk Take 17 g by mouth once daily.    prenatal #108-iron,carbonyl-FA 30-1 mg Tab Take by mouth once daily.           Verify that the below list of medications is an accurate representation of the medications you are currently taking.  If none reported, the list may be blank. If incorrect, please contact your healthcare provider. Carry this list with you in case of emergency.           Current Medications     ibuprofen (ADVIL,MOTRIN) 600 MG tablet Take 1 tablet (600 mg total) by mouth every 6 (six) hours as needed for Pain (cramping).    docusate sodium (COLACE) 100 MG capsule Take 1 capsule (100 mg total) by mouth 2 (two) times daily.    ketorolac (TORADOL) 10 mg tablet Take 1 tablet (10 mg total) by mouth every 6 (six) hours.    oxycodone-acetaminophen (PERCOCET) 5-325 mg per tablet Take 1 tablet by mouth every 4 (four) hours as needed for Pain.           Clinical Reference Information           Prenatal Vitals     Enc. Date GA Prenatal Vitals Prenatal Pulse Pain Level Urine Albumin/Glucose Edema Presentation Dilation/Effacement/Station    4/12/17 39w0d 108/60 / 58.3 kg (128 lb 8.5 oz)           4/7/17 39w0d 124/82 / 58.4 kg (128 lb 13.7 oz)           3/30/17 39w0d 116/94 (A) / 59.4 kg (130 lb 15.3 oz)           3/17/17 39w0d Admission Dept: [facility]    3/15/17 38w5d 118/78 / 68.3 kg (150 lb 11 oz)  / 150 / Present  0 Negative / Negative None / None / None  5 / 90    3/9/17 37w6d 122/84 / 67.8 kg (149 lb 7.9 oz) 37 cm / 145 / Present  0 Negative / Negative None /  "None / None Vertex 5 / 90 / -2    3/3/17 37w0d 114/82 / 67 kg (147 lb 13.1 oz) 37 cm / 145 / Present   Negative / Negative None / None / None Vertex 4.5 / 90 / -2    2/22/17 35w5d 120/82 / 65.6 kg (144 lb 11.7 oz) 36 cm / 152 / Present   Negative / Negative None / None / None Vertex 4 / 90 / -2    2/18/17 35w1d Admission Dept: [facility]    2/17/17 35w0d 118/80 / 64.5 kg (142 lb 3.2 oz) 34 cm / 142 / Present   Negative / Negative None / None / None Vertex 4 / 90 / -2    2/13/17 34w3d 120/90 (A) / 63.6 kg (140 lb 5.2 oz)  /  / Present   Negative / Negative None / None / None  1    2/7/17 33w4d 120/82 / 64.1 kg (141 lb 5 oz) 33 cm / 148 / Present   Negative / Negative None / None / None  1    2/3/17 33w0d 110/76 / 64.8 kg (142 lb 15.5 oz) 33 cm / 142 / Present   Negative / Negative None / None / None      1/20/17 31w0d 110/70 / 62 kg (136 lb 11 oz) 31 cm / 148 / Present   Negative / Negative None / None / None      1/6/17 29w0d 118/76 / 60.1 kg (132 lb 6.2 oz) 28 cm / 133 / Present   Negative / Negative None / None / None      12/2/16 24w0d 110/68 / 56.1 kg (123 lb 10.9 oz) 24 cm / 132 / Present   Negative / Negative None / None / None      11/4/16 20w0d 112/70 / 53.4 kg (117 lb 9.9 oz) 20 cm / 148 / Present   Negative / Negative None / None / None      10/6/16 15w6d 104/62 / 51 kg (112 lb 5.2 oz)  / 156 / Absent   Negative / Negative None / None / None      9/6/16 11w4d 116/78  /  / Absent   Negative / Negative None / None / None      8/19/16 7w6d 116/76 / 47.8 kg (105 lb 6.1 oz)              Number of babies: 1   Height: 5' 3" (1.6 m)       Your Vitals Were     BP Height Weight Last Period BMI    108/60 (BP Location: Left arm, Patient Position: Sitting, BP Method: Manual) 5' 3" (1.6 m) 58.3 kg (128 lb 8.5 oz) 06/17/2016 (Exact Date) 22.77 kg/m2      Blood Pressure          Most Recent Value    BP  108/60      Allergies as of 4/12/2017     Codeine      Immunizations Administered on Date of Encounter - 4/12/2017     " None      Instructions      Vaginal Tear (Non-Obstetric)    A vaginal tear (laceration) is a wound in the tissues of the vagina. It can be caused by damage during sex. Putting a foreign object into the vagina may also cause a tear. Other factors that can make a tear more likely include thinning of tissue in the vagina due to aging or scarring of the tissue due to surgery. A straddle injury (injury in the crotch area during activities such as cycling) can also lead to a tear in the vagina.  Treatment depends on how severe your tear is:  · Shallow (superficial) tears may cause mild pain and light bleeding. These tears often heal on their own with very little treatment.  · Deep tears are more likely to cause more severe pain or heavy bleeding. They must be repaired with surgery.  Home care  · To help relieve pain:  ¨ Use over-the-counter pain medicine as directed. If needed, stronger pain medicines may be prescribed.  ¨ Soak in a bath with a few inches of warm water. Do this for 10 to 15 minutes a few times a day, or as directed.  · If you lost a lot of blood, you may feel weak. Rest as needed until you feel stronger.  · Avoid touching the tear while it is healing.  · Dont douche unless your healthcare provider says its OK.  · Wait to use tampons or have sex until the tear has healed. This may take a few weeks or longer.  · If the tear was an accidental injury during sex, ask your provider how you might prevent similar injuries in the future. This may include using a water-based lubricant during sex.  Follow-up care  Follow up with your healthcare provider, or as directed. If stitches (sutures) were used to repair your tear, these will dissolve and dont need to be removed.  When to seek medical advice  Call the healthcare provider right away if any of these occur:  · Bleeding continues or worsens  · Pain continues or worsens  · Unusual or foul-smelling discharge from the vagina  · Fever of 100.4ºF (38ºC) or higher,  or as directed by your provider  · Dizziness, weakness or fainting  Date Last Reviewed: 7/30/2015  © 1509-9296 [employer], [employer]. [address] [place] [address], [place] [address]. All rights reserved. This information is not intended as a substitute for professional medical care. Always follow your healthcare professional's instructions.             Language Assistance Services     ATTENTION: Language assistance services are available, free of charge. Please call [phone].      ATENCIÓN: Si habla lizet, tiene a peres disposición servicios gratuitos de asistencia lingüística. Llame al [phone].     CHÚ Ý: N?u b?n nói Ti?ng Vi?t, có các d?ch v? h? tr? ngôn ng? mi?n phí dành cho b?n. G?i s? [phone].         [facility] complies with applicable Federal civil rights laws and does not discriminate on the basis of race, color, national origin, age, disability, or sex.

## 2017-04-12 NOTE — PATIENT INSTRUCTIONS
Vaginal Tear (Non-Obstetric)    A vaginal tear (laceration) is a wound in the tissues of the vagina. It can be caused by damage during sex. Putting a foreign object into the vagina may also cause a tear. Other factors that can make a tear more likely include thinning of tissue in the vagina due to aging or scarring of the tissue due to surgery. A straddle injury (injury in the crotch area during activities such as cycling) can also lead to a tear in the vagina.  Treatment depends on how severe your tear is:  · Shallow (superficial) tears may cause mild pain and light bleeding. These tears often heal on their own with very little treatment.  · Deep tears are more likely to cause more severe pain or heavy bleeding. They must be repaired with surgery.  Home care  · To help relieve pain:  ¨ Use over-the-counter pain medicine as directed. If needed, stronger pain medicines may be prescribed.  ¨ Soak in a bath with a few inches of warm water. Do this for 10 to 15 minutes a few times a day, or as directed.  · If you lost a lot of blood, you may feel weak. Rest as needed until you feel stronger.  · Avoid touching the tear while it is healing.  · Dont douche unless your healthcare provider says its OK.  · Wait to use tampons or have sex until the tear has healed. This may take a few weeks or longer.  · If the tear was an accidental injury during sex, ask your provider how you might prevent similar injuries in the future. This may include using a water-based lubricant during sex.  Follow-up care  Follow up with your healthcare provider, or as directed. If stitches (sutures) were used to repair your tear, these will dissolve and dont need to be removed.  When to seek medical advice  Call the healthcare provider right away if any of these occur:  · Bleeding continues or worsens  · Pain continues or worsens  · Unusual or foul-smelling discharge from the vagina  · Fever of 100.4ºF (38ºC) or higher, or as directed by your  provider  · Dizziness, weakness or fainting  Date Last Reviewed: 7/30/2015  © 1232-0699 [employer], [employer]. [address] [place] [address], [place] [address]. All rights reserved. This information is not intended as a substitute for professional medical care. Always follow your healthcare professional's instructions.

## 2017-04-12 NOTE — LETTER
April 12, 2017      Martin Sky MD  [address]  [address]  [facility] [address]           [facility] [address] [place] [address]  Phone: [phone]          Patient: Earlene Nassar   MR Number: [id number]   YOB: 1990   Date of Visit: 4/12/2017       Dear Dr. Martin Sky:    Thank you for referring Earlene Nassar to me for evaluation. Attached you will find relevant portions of my assessment and plan of care.    If you have questions, please do not hesitate to call me. I look forward to following Earlene Nassar along with you.    Sincerely,    Adilia Lu, DO [facility]  CC:  No Recipients    If you would like to receive this communication electronically, please contact [email or web address] or [phone] to request more information on [facility] access.    For providers and/or their staff who would like to refer a patient to [facility], please contact us through our one-stop-shop provider referral line, [facility], at [phone].    If you feel you have received this communication in error or would no longer like to receive these types of communications, please e-mail [email or web address]

## 2017-04-17 NOTE — TELEPHONE ENCOUNTER
----- Message from Martin Sky MD sent at 4/12/2017  5:09 PM CDT -----  Please call the patient and give instructions on stopping all laxatives and stool softners of which I told her to stop.  Tell her to increase fiber and see instructions below.  Also she needs to be called in estrace cream pea size amount externally nightly and call in rx.  Get her in with Abilio this week.  I am just seeing this response.  Let me know what happens.   Text me  ----- Message -----     From: Kamala Alatorre MD     Sent: 4/8/2017   7:23 PM       To: MD Felipe Warren!  No problem--we will get her in soon to see her :)  I am [place] of the country this week, back after [facility].      If ok, I can get her into to see Tabitha this week, as she will be here this week?      In the meantime, I would have her start fiber to try to bulk stool, which will help her bowels move more regularly and help her pass them better, hopefully.  I usually recommend the following:  -- Start daily fiber.  Take 1 tsp of fiber powder (psyllium or other sugar-free powder).  Mix in 8 oz of water.  Take x 3-5 days.  Then, increase fiber by 1 tsp every 3-5 days until stool is easy to pass.  Stop and continue at that dose.   Do not exceed 6 tsps/day.  May also use over the counter stool softener 1-2 x/day.  AVOID laxatives.    Also, she can start using a little premarin cream or Estrace cream on the healing area every night (just a small amount with her finger).  Will help expedite healing, rosario if she is breast feeding, which would be keeping her estrogen levels a little low.      Hope this plan sounds ok.  Thank you so much for sending her our way.  She will be in good hands with Alice--I just want to make sure we get her seen sooner rather than later, and since I'm out this week, I don't want to delay things.     Hope you're well!  Kamala      ----- Message -----     From: Martin Sky MD     Sent: 4/7/2017   6:01 PM       To: Kamala Alatorre,  MD Rocael Wray,  This patient is 3 weeks out of  with 4th degree laceration repair.  Initially I put her on miralax daily or every other day so that she wasn't having any hard BM so she can heal.  I saw her today, she stopped the miralax 4 days ago, her exam she is healing well and even has good tone on rectal exam, HOWEVER she states that she is having fecal incontinence when she has the urge and sometimes has small accident of soft stool.  I told her to stop the miralax and that I will get her in with you and she may need pelvic floor PT.  What is your recs and can I get her in with you soon?  Thank you!  Martin

## 2017-04-18 ENCOUNTER — TELEPHONE (OUTPATIENT)
Dept: OBSTETRICS AND GYNECOLOGY | Facility: CLINIC | Age: 27
End: 2017-04-18

## 2017-04-18 RX ORDER — ESTRADIOL 0.1 MG/G
1 CREAM VAGINAL NIGHTLY
Qty: 42.5 G | Refills: 6 | Status: SHIPPED | OUTPATIENT
Start: 2017-04-18 | End: 2017-04-26 | Stop reason: SDUPTHER

## 2017-04-18 RX ORDER — ESTRADIOL 0.1 MG/G
CREAM VAGINAL
Qty: 1 TUBE | Refills: 1 | Status: SHIPPED | OUTPATIENT
Start: 2017-04-18 | End: 2018-05-31

## 2017-04-19 ENCOUNTER — TELEPHONE (OUTPATIENT)
Dept: OBSTETRICS AND GYNECOLOGY | Facility: CLINIC | Age: 27
End: 2017-04-19

## 2017-04-19 NOTE — TELEPHONE ENCOUNTER
Called in coupon card to pharm and rx would now be $119. Let pt know and she said that is much better than before. Let her know I would still speak with MD and see if she wanted her to try something else. Pt agrees.    Dr. Sky, you want her to  rx for estrace cream or try another rx?

## 2017-04-19 NOTE — TELEPHONE ENCOUNTER
Dr. Sky's pt calling, states that the Rx for the estradiol cream was over $300 and would like to know if a generic version or something cheaper can be sent in. Pt's # [phone].

## 2017-04-20 ENCOUNTER — NURSE TRIAGE (OUTPATIENT)
Dept: ADMINISTRATIVE | Facility: CLINIC | Age: 27
End: 2017-04-20

## 2017-04-20 ENCOUNTER — POSTPARTUM VISIT (OUTPATIENT)
Dept: OBSTETRICS AND GYNECOLOGY | Facility: CLINIC | Age: 27
End: 2017-04-20
Attending: OBSTETRICS & GYNECOLOGY
Payer: COMMERCIAL

## 2017-04-20 VITALS
WEIGHT: 129.63 LBS | HEIGHT: 63 IN | DIASTOLIC BLOOD PRESSURE: 76 MMHG | BODY MASS INDEX: 22.97 KG/M2 | SYSTOLIC BLOOD PRESSURE: 122 MMHG

## 2017-04-20 PROCEDURE — 0503F POSTPARTUM CARE VISIT: CPT | Mod: S$GLB,,, | Performed by: OBSTETRICS & GYNECOLOGY

## 2017-04-20 PROCEDURE — 99999 PR PBB SHADOW E&M-EST. PATIENT-LVL III: CPT | Mod: PBBFAC,,, | Performed by: OBSTETRICS & GYNECOLOGY

## 2017-04-20 RX ORDER — SULFAMETHOXAZOLE AND TRIMETHOPRIM 400; 80 MG/1; MG/1
1 TABLET ORAL 2 TIMES DAILY
Qty: 20 TABLET | Refills: 0 | Status: SHIPPED | OUTPATIENT
Start: 2017-04-20 | End: 2017-04-30

## 2017-04-20 NOTE — TELEPHONE ENCOUNTER
"Pt states her incision felt more irritated so she looked at the incision this AM and she has what appears to be like a  "white head pimple" right outside incision. Reassured her it could be infected hair follicle but since her incision felt more irritated it would be best to check it out.  Scheduled with Yas today at 1320.    Dr. Sky let me know if you want to see her instead.    "

## 2017-04-20 NOTE — TELEPHONE ENCOUNTER
"  Reason for Disposition   Nursing judgment    Protocols used: ST NO PROTOCOL CALL: INFORMATION ONLY-A-OH  patient reports that she noticed what looks like a very large "white head" on her incision from her episiotomy. Pt reports an increase in pain to that area over the past couple of days. Pt is wanting to know what to do.    Please contact patient for further instructions, questions, or concerns.  "

## 2017-04-20 NOTE — MR AVS SNAPSHOT
[facility]  [address], [address]  [facility] [address]  Phone: [phone]  Fax: [phone]                  Earlene Nassar   2017 1:30 PM   Postpartum Visit    Description:  Female : 1990   Provider:  Martin Sky MD   Department:  [facility]           Reason for Visit     Postpartum Care           Diagnoses this Visit        Comments    Encounter for postpartum visit    -  Primary            To Do List           Future Appointments        Provider Department Dept Phone    2017 1:30 PM Adilia Lu DO [facility] [phone]    2017 9:45 AM Martin Sky MD [facility] [phone]      Goals (5 Years of Data)     None      [facility]     [facility] On Call Nurse Care Line - 24/ Assistance  Unless otherwise directed by your provider, please contact [facility], our nurse care line that is available for  assistance.     Registered nurses in the [facility] provide: appointment scheduling, clinical advisement, health education, and other advisory services.  Call: [phone] (toll free)               Medications           Message regarding Medications     Verify the changes and/or additions to your medication regime listed below are the same as discussed with your clinician today.  If any of these changes or additions are incorrect, please notify your healthcare provider.             Verify that the below list of medications is an accurate representation of the medications you are currently taking.  If none reported, the list may be blank. If incorrect, please contact your healthcare provider. Carry this list with you in case of emergency.           Current Medications     docusate sodium (COLACE) 100 MG capsule Take 1 capsule (100 mg total) by mouth 2 (two) times daily.    estradiol (ESTRACE) 0.01 % (0.1 mg/gram) vaginal cream Apply pea size amount nightly to external area.     estradiol (ESTRACE) 0.01 % (0.1 mg/gram) vaginal cream Place 1 g vaginally every evening.    ibuprofen (ADVIL,MOTRIN) 600 MG tablet Take 1 tablet (600 mg total) by mouth every 6 (six) hours as needed for Pain (cramping).    ketorolac (TORADOL) 10 mg tablet Take 1 tablet (10 mg total) by mouth every 6 (six) hours.    oxycodone-acetaminophen (PERCOCET) 5-325 mg per tablet Take 1 tablet by mouth every 4 (four) hours as needed for Pain.           Clinical Reference Information           Prenatal Vitals     Enc. Date GA Prenatal Vitals Prenatal Pulse Pain Level Urine Albumin/Glucose Edema Presentation Dilation/Effacement/Station    4/20/17 39w0d 122/76 / 58.8 kg (129 lb 10.1 oz)           4/7/17 39w0d 124/82 / 58.4 kg (128 lb 13.7 oz)           3/30/17 39w0d 116/94 (A) / 59.4 kg (130 lb 15.3 oz)           3/17/17 39w0d Admission Dept: [facility]    3/15/17 38w5d 118/78 / 68.3 kg (150 lb 11 oz)  / 150 / Present  0 Negative / Negative None / None / None  5 / 90    3/9/17 37w6d 122/84 / 67.8 kg (149 lb 7.9 oz) 37 cm / 145 / Present  0 Negative / Negative None / None / None Vertex 5 / 90 / -2    3/3/17 37w0d 114/82 / 67 kg (147 lb 13.1 oz) 37 cm / 145 / Present   Negative / Negative None / None / None Vertex 4.5 / 90 / -2    2/22/17 35w5d 120/82 / 65.6 kg (144 lb 11.7 oz) 36 cm / 152 / Present   Negative / Negative None / None / None Vertex 4 / 90 / -2    2/18/17 35w1d Admission Dept: [facility]    2/17/17 35w0d 118/80 / 64.5 kg (142 lb 3.2 oz) 34 cm / 142 / Present   Negative / Negative None / None / None Vertex 4 / 90 / -2    2/13/17 34w3d 120/90 (A) / 63.6 kg (140 lb 5.2 oz)  /  / Present   Negative / Negative None / None / None  1    2/7/17 33w4d 120/82 / 64.1 kg (141 lb 5 oz) 33 cm / 148 / Present   Negative / Negative None / None / None  1    2/3/17 33w0d 110/76 / 64.8 kg (142 lb 15.5 oz) 33 cm / 142 / Present   Negative / Negative None / None / None      1/20/17 31w0d 110/70 / 62 kg (136 lb 11 oz) 31 cm / 148 /  "Present   Negative / Negative None / None / None      1/6/17 29w0d 118/76 / 60.1 kg (132 lb 6.2 oz) 28 cm / 133 / Present   Negative / Negative None / None / None      12/2/16 24w0d 110/68 / 56.1 kg (123 lb 10.9 oz) 24 cm / 132 / Present   Negative / Negative None / None / None      11/4/16 20w0d 112/70 / 53.4 kg (117 lb 9.9 oz) 20 cm / 148 / Present   Negative / Negative None / None / None      10/6/16 15w6d 104/62 / 51 kg (112 lb 5.2 oz)  / 156 / Absent   Negative / Negative None / None / None      9/6/16 11w4d 116/78  /  / Absent   Negative / Negative None / None / None      8/19/16 7w6d 116/76 / 47.8 kg (105 lb 6.1 oz)              Number of babies: 1   Height: 5' 3" (1.6 m)       Your Vitals Were     BP Height Weight Last Period BMI    122/76 5' 3" (1.6 m) 58.8 kg (129 lb 10.1 oz) 06/17/2016 (Exact Date) 22.96 kg/m2      Allergies as of 4/20/2017     Codeine      Immunizations Administered on Date of Encounter - 4/20/2017     None      Language Assistance Services     ATTENTION: Language assistance services are available, free of charge. Please call [phone].      ATENCIÓN: Si habla español, tiene a peres disposición servicios gratuitos de asistencia lingüística. Llame al [phone].     CHÚ Ý: N?u b?n nói Ti?ng Vi?t, có các d?ch v? h? tr? ngôn ng? mi?n phí dành cho b?n. G?i s? [phone].         [facility] complies with applicable Federal civil rights laws and does not discriminate on the basis of race, color, national origin, age, disability, or sex.        "

## 2017-04-20 NOTE — PROGRESS NOTES
"CC: worried about infection    Earlene Nassar is a 26 y.o. female  5 wks post  with 4th degree repair worried there is pea size white head on her perineum.  No drainage.  Feels tissue rub together when walks and is painful.  Nervous that pain is not improving.  Was given motrin with tramadol but felt too sedated on tramadol will try tylenol alternating with motrin.  Having normal BM with no fecal or flatus incontinence.       History reviewed. No pertinent past medical history.    Past Surgical History:   Procedure Laterality Date    ADENOIDECTOMY      TONSILLECTOMY         OB History    Para Term  AB SAB TAB Ectopic Multiple Living   1 1 1      0 1      # Outcome Date GA Lbr Andrew/2nd Weight Sex Delivery Anes PTL Lv   1 Term 17 39w0d  3.402 kg (7 lb 8 oz) M Vag-Spont EPI N Y      Obstetric Comments   Menarche ~14       Family History   Problem Relation Age of Onset    No Known Problems Mother     Heart disease Father     No Known Problems Maternal Grandmother     No Known Problems Maternal Grandfather     No Known Problems Paternal Grandmother     No Known Problems Paternal Grandfather     Breast cancer Neg Hx     Colon cancer Neg Hx     Ovarian cancer Neg Hx     Cervical cancer Neg Hx     Endometrial cancer Neg Hx     Vaginal cancer Neg Hx        Social History   Substance Use Topics    Smoking status: Never Smoker    Smokeless tobacco: Never Used    Alcohol use Yes      Comment: occasionally       /76  Ht 5' 3" (1.6 m)  Wt 58.8 kg (129 lb 10.1 oz)  LMP 2016 (Exact Date)  Breastfeeding? No  BMI 22.96 kg/m2    ROS:  GENERAL: Denies weight gain or weight loss. Feeling well overall.   SKIN: Denies rash or lesions.   HEAD: Denies head injury or headache.   NODES: Denies enlarged lymph nodes.   CHEST: Denies chest pain or shortness of breath.   CARDIOVASCULAR: Denies palpitations or left sided chest pain.   ABDOMEN: No abdominal pain, constipation, " diarrhea, nausea, vomiting or rectal bleeding.   URINARY: No frequency, dysuria, hematuria, or burning on urination.  REPRODUCTIVE: See HPI.   BREASTS: denies pain, lumps, or nipple discharge.   HEMATOLOGIC: No easy bruisability or excessive bleeding.  MUSCULOSKELETAL: Denies joint pain or swelling.   NEUROLOGIC: Denies syncope or weakness.   PSYCHIATRIC: Denies depression, anxiety or mood swings.    Physical Exam:    APPEARANCE: Well nourished, well developed, in no acute distress.  AFFECT: WNL, alert and oriented x 3  SKIN: No acne or hirsutism  NECK: Neck symmetric without masses or thyromegaly  NODES: No inguinal, cervical, axillary, or femoral lymph node enlargement  CHEST: Good respiratory effect  ABDOMEN: Soft.  No tenderness or masses.  No hepatosplenomegaly.  No hernias.  PELVIC: Normal external genitalia without lesions.  Normal hair distribution.  Adequate perineal body, normal urethral meatus.  Vagina moist and well rugated without lesions or discharge.  Cervix pink, without lesions, discharge or tenderness.  No significant cystocele or rectocele.  Bimanual exam shows uterus to be normal size, regular, mobile and nontender.  Adnexa without masses or tenderness.    EXTREMITIES: No edema.    ASSESSMENT AND PLAN    Earlene was seen today for postpartum care.    Diagnoses and all orders for this visit:    Encounter for postpartum visit  -     sulfamethoxazole-trimethoprim 400-80mg (BACTRIM) 400-80 mg per tablet; Take 1 tablet by mouth 2 (two) times daily.    not infected seeing suture, reassurance given.  abx for prophylaxis for subclinical infection since pain has increased but likely it's due to her increase activity and now having normal BM.  Discussed starting fiber since stools are becoming more hard and large.     Return in about 1 week (around 4/27/2017).

## 2017-04-26 ENCOUNTER — OFFICE VISIT (OUTPATIENT)
Dept: UROGYNECOLOGY | Facility: CLINIC | Age: 27
End: 2017-04-26
Payer: COMMERCIAL

## 2017-04-26 VITALS
DIASTOLIC BLOOD PRESSURE: 80 MMHG | HEIGHT: 63 IN | WEIGHT: 129 LBS | BODY MASS INDEX: 22.86 KG/M2 | SYSTOLIC BLOOD PRESSURE: 100 MMHG

## 2017-04-26 DIAGNOSIS — R15.2 FECAL URGENCY: Primary | ICD-10-CM

## 2017-04-26 PROCEDURE — 1160F RVW MEDS BY RX/DR IN RCRD: CPT | Mod: S$GLB,,, | Performed by: OBSTETRICS & GYNECOLOGY

## 2017-04-26 PROCEDURE — 99999 PR PBB SHADOW E&M-EST. PATIENT-LVL III: CPT | Mod: PBBFAC,,, | Performed by: OBSTETRICS & GYNECOLOGY

## 2017-04-26 PROCEDURE — 99213 OFFICE O/P EST LOW 20 MIN: CPT | Mod: S$GLB,,, | Performed by: OBSTETRICS & GYNECOLOGY

## 2017-04-26 NOTE — PROGRESS NOTES
Subjective:       Patient ID: Earlene Nassar is a 26 y.o. female.    Chief Complaint:  Follow-up (4th degree laceration/ fecal incontinence)      History of Present Illness: 27yo P1 who is 4 weeks postpartum from  complicated by episiotomy & 4th degree laceration. After started taking miralax once per day for prevention, while taking that she had urgency with fecal incontinence with soft stool every other day. She stopped miralax 5 days ago, states her BM are now more formed and hard with incomplete emptying. Since stopping miralax denies fecal incontinence or smearing, but still complains of urgency. Denies splinting. She is not currently taking any stool softeners or fiber. Denies gas incontinence. Pain is well controlled with scheduled ibuprofen q6h. Denies vaginal discharge or drainage from tear. Denies stress or urge urinary incontinence. She was referred by Dr. Sky for evaluation.     Interval history: Seen by Dr. Sky last week started on bactrim and vaginal estrogen with resolution of vaginal pain no longer using Toradol.  She has persistent fecal urgency and rare incontinence. Currently taking the stool softener and fiber, not straining with BM's. Denies fever or chills.       HPI    GYN & OB History  Patient's last menstrual period was 2016 (exact date).   Date of Last Pap: No result found    OB History    Para Term  AB SAB TAB Ectopic Multiple Living   1 1 1      0 1      # Outcome Date GA Lbr Andrew/2nd Weight Sex Delivery Anes PTL Lv   1 Term 17 39w0d  3.402 kg (7 lb 8 oz) M Vag-Spont EPI N Y      Obstetric Comments   Menarche ~14     Past Surgical History:   Procedure Laterality Date    ADENOIDECTOMY      TONSILLECTOMY         Current Outpatient Prescriptions:     docusate sodium (COLACE) 100 MG capsule, Take 1 capsule (100 mg total) by mouth 2 (two) times daily., Disp: 60 capsule, Rfl: 2    estradiol (ESTRACE) 0.01 % (0.1 mg/gram) vaginal cream, Apply pea size amount  nightly to external area., Disp: 1 Tube, Rfl: 1    ibuprofen (ADVIL,MOTRIN) 600 MG tablet, Take 1 tablet (600 mg total) by mouth every 6 (six) hours as needed for Pain (cramping)., Disp: 60 tablet, Rfl: 0    sulfamethoxazole-trimethoprim 400-80mg (BACTRIM) 400-80 mg per tablet, Take 1 tablet by mouth 2 (two) times daily., Disp: 20 tablet, Rfl: 0    ketorolac (TORADOL) 10 mg tablet, Take 1 tablet (10 mg total) by mouth every 6 (six) hours., Disp: 20 tablet, Rfl: 0    oxycodone-acetaminophen (PERCOCET) 5-325 mg per tablet, Take 1 tablet by mouth every 4 (four) hours as needed for Pain., Disp: 30 tablet, Rfl: 0    Review of patient's allergies indicates:   Allergen Reactions    Codeine Other (See Comments)     Per pt's mother she had rash as a child but took narcotics after Tonsillectomy without problems         Review of Systems  Review of Systems   Constitutional: Negative.  Negative for activity change, appetite change, chills, diaphoresis, fatigue, fever and unexpected weight change.   HENT: Negative.    Eyes: Negative.    Respiratory: Negative.  Negative for apnea, cough and wheezing.    Cardiovascular: Negative.  Negative for chest pain and palpitations.   Gastrointestinal: Positive for rectal pain. Negative for abdominal distention, abdominal pain, anal bleeding, blood in stool, constipation, diarrhea, nausea and vomiting.        Fecal urgency/incontinence with soft stool   Endocrine: Negative.    Genitourinary: Positive for vaginal bleeding and vaginal discharge (lochia). Negative for decreased urine volume, difficulty urinating, dyspareunia, dysuria, enuresis, flank pain, frequency, genital sores, hematuria, menstrual problem, pelvic pain and urgency.   Musculoskeletal: Negative for back pain and gait problem.   Skin: Negative for color change, pallor, rash and wound.   Allergic/Immunologic: Negative for immunocompromised state.   Neurological: Negative.  Negative for dizziness and speech difficulty.    Hematological: Negative for adenopathy.   Psychiatric/Behavioral: Negative for agitation, behavioral problems, confusion and sleep disturbance.           Objective:     Physical Exam   Constitutional: She is oriented to person, place, and time. She appears well-developed and well-nourished. No distress.   Cardiovascular: Normal rate and regular rhythm.    Pulmonary/Chest: Effort normal.   Abdominal: Soft. She exhibits no distension and no mass. There is no tenderness. There is no rebound and no guarding.   Genitourinary:   Genitourinary Comments: Posterior vaginal wall with sutures in place, healing well, no erythema or exudate.Normal lochia present.   Musculoskeletal: Normal range of motion.   Neurological: She is alert and oriented to person, place, and time.   Skin: Skin is warm and dry.   Psychiatric: She has a normal mood and affect.          Assessment:        1. Fecal urgency    2. Obstetric vaginal laceration, delivered, current hospitalization             Plan:      1. 4th degree laceration w/fecal incontinence   - laceration healing well  - Start colace 100mg BID  - start fiber at next visit  - Start external pelvic floor PT with [facility]    - F/U in 2 weeks,  If persistent will proceed with an endoanal sonogram and anal manometry to evaluate the sphincter      Approximately 20 min were spent in consult, 90 % in discussion.     Adilia Lu DO  Female Pelvic Medicine and Reconstructive Surgery  [facility] [place], [place]

## 2017-04-26 NOTE — MR AVS SNAPSHOT
[facility]  [address] [address]  Phone: [phone]                  Earlene Nassar   2017 1:30 PM   Office Visit    Description:  Female : 1990   Provider:  Adilia Lu DO   Department:  [facility]           Reason for Visit     Follow-up           Diagnoses this Visit        Comments    Fecal urgency    -  Primary     Obstetric vaginal laceration, delivered, current hospitalization                To Do List           Future Appointments        Provider Department Dept Phone    2017 9:45 AM [facility] [phone]    5/10/2017 10:30 AM Adilia Lu DO [facility] [phone]      Goals (5 Years of Data)     None      [facility]     [facility] Care [facility] -  Assistance  Unless otherwise directed by your provider, please contact [facility], our nurse care line that is available for  assistance.     Registered nurses in the [facility] provide: appointment scheduling, clinical advisement, health education, and other advisory services.  Call: [phone] (toll free)               Medications           Message regarding Medications     Verify the changes and/or additions to your medication regime listed below are the same as discussed with your clinician today.  If any of these changes or additions are incorrect, please notify your healthcare provider.             Verify that the below list of medications is an accurate representation of the medications you are currently taking.  If none reported, the list may be blank. If incorrect, please contact your healthcare provider. Carry this list with you in case of emergency.           Current Medications     docusate sodium (COLACE) 100 MG capsule Take 1 capsule (100 mg total) by mouth 2 (two) times daily.    estradiol (ESTRACE) 0.01 % (0.1 mg/gram) vaginal cream Apply pea size  amount nightly to external area.    ibuprofen (ADVIL,MOTRIN) 600 MG tablet Take 1 tablet (600 mg total) by mouth every 6 (six) hours as needed for Pain (cramping).    sulfamethoxazole-trimethoprim 400-80mg (BACTRIM) 400-80 mg per tablet Take 1 tablet by mouth 2 (two) times daily.    ketorolac (TORADOL) 10 mg tablet Take 1 tablet (10 mg total) by mouth every 6 (six) hours.    oxycodone-acetaminophen (PERCOCET) 5-325 mg per tablet Take 1 tablet by mouth every 4 (four) hours as needed for Pain.           Clinical Reference Information           Prenatal Vitals     Enc. Date GA Prenatal Vitals Prenatal Pulse Pain Level Urine Albumin/Glucose Edema Presentation Dilation/Effacement/Station    4/26/17 39w0d 100/80 / 58.5 kg (128 lb 15.5 oz)           4/20/17 39w0d 122/76 / 58.8 kg (129 lb 10.1 oz)           4/7/17 39w0d 124/82 / 58.4 kg (128 lb 13.7 oz)           3/30/17 39w0d 116/94 (A) / 59.4 kg (130 lb 15.3 oz)           3/17/17 39w0d Admission Dept: [facility]    3/15/17 38w5d 118/78 / 68.3 kg (150 lb 11 oz)  / 150 / Present  0 Negative / Negative None / None / None  5 / 90    3/9/17 37w6d 122/84 / 67.8 kg (149 lb 7.9 oz) 37 cm / 145 / Present  0 Negative / Negative None / None / None Vertex 5 / 90 / -2    3/3/17 37w0d 114/82 / 67 kg (147 lb 13.1 oz) 37 cm / 145 / Present   Negative / Negative None / None / None Vertex 4.5 / 90 / -2    2/22/17 35w5d 120/82 / 65.6 kg (144 lb 11.7 oz) 36 cm / 152 / Present   Negative / Negative None / None / None Vertex 4 / 90 / -2    2/18/17 35w1d Admission Dept: [facility]    2/17/17 35w0d 118/80 / 64.5 kg (142 lb 3.2 oz) 34 cm / 142 / Present   Negative / Negative None / None / None Vertex 4 / 90 / -2    2/13/17 34w3d 120/90 (A) / 63.6 kg (140 lb 5.2 oz)  /  / Present   Negative / Negative None / None / None  1    2/7/17 33w4d 120/82 / 64.1 kg (141 lb 5 oz) 33 cm / 148 / Present   Negative / Negative None / None / None  1    2/3/17 33w0d 110/76 / 64.8 kg (142 lb 15.5 oz) 33 cm / 142  "/ Present   Negative / Negative None / None / None      1/20/17 31w0d 110/70 / 62 kg (136 lb 11 oz) 31 cm / 148 / Present   Negative / Negative None / None / None      1/6/17 29w0d 118/76 / 60.1 kg (132 lb 6.2 oz) 28 cm / 133 / Present   Negative / Negative None / None / None      12/2/16 24w0d 110/68 / 56.1 kg (123 lb 10.9 oz) 24 cm / 132 / Present   Negative / Negative None / None / None      11/4/16 20w0d 112/70 / 53.4 kg (117 lb 9.9 oz) 20 cm / 148 / Present   Negative / Negative None / None / None      10/6/16 15w6d 104/62 / 51 kg (112 lb 5.2 oz)  / 156 / Absent   Negative / Negative None / None / None      9/6/16 11w4d 116/78  /  / Absent   Negative / Negative None / None / None      8/19/16 7w6d 116/76 / 47.8 kg (105 lb 6.1 oz)              Number of babies: 1   Height: 5' 3" (1.6 m)       Your Vitals Were     BP Height Weight Last Period BMI    100/80 5' 3" (1.6 m) 58.5 kg (128 lb 15.5 oz) 06/17/2016 (Exact Date) 22.85 kg/m2      Blood Pressure          Most Recent Value    BP  100/80      Allergies as of 4/26/2017     Codeine      Immunizations Administered on Date of Encounter - 4/26/2017     None      Orders Placed During Today's Visit      Normal Orders This Visit    Ambulatory consult to Physical Therapy       Language Assistance Services     ATTENTION: Language assistance services are available, free of charge. Please call [phone].      ATENCIÓN: Si habla español, tiene a peres disposición servicios gratuitos de asistencia lingüística. Llame al [phone].     RAMA Ý: N?u b?n nói Ti?ng Vi?t, có các d?ch v? h? tr? ngôn ng? mi?n phí dành cho b?n. G?i s? [phone].         [facility] complies with applicable Federal civil rights laws and does not discriminate on the basis of race, color, national origin, age, disability, or sex.        "

## 2017-04-28 ENCOUNTER — POSTPARTUM VISIT (OUTPATIENT)
Dept: OBSTETRICS AND GYNECOLOGY | Facility: CLINIC | Age: 27
End: 2017-04-28
Attending: OBSTETRICS & GYNECOLOGY
Payer: COMMERCIAL

## 2017-04-28 ENCOUNTER — TELEPHONE (OUTPATIENT)
Dept: OBSTETRICS AND GYNECOLOGY | Facility: CLINIC | Age: 27
End: 2017-04-28

## 2017-04-28 VITALS
HEIGHT: 63 IN | SYSTOLIC BLOOD PRESSURE: 120 MMHG | WEIGHT: 126.31 LBS | DIASTOLIC BLOOD PRESSURE: 84 MMHG | BODY MASS INDEX: 22.38 KG/M2

## 2017-04-28 PROCEDURE — 99999 PR PBB SHADOW E&M-EST. PATIENT-LVL II: CPT | Mod: PBBFAC,,, | Performed by: OBSTETRICS & GYNECOLOGY

## 2017-04-28 PROCEDURE — 0503F POSTPARTUM CARE VISIT: CPT | Mod: S$GLB,,, | Performed by: OBSTETRICS & GYNECOLOGY

## 2017-04-28 RX ORDER — NORETHINDRONE ACETATE AND ETHINYL ESTRADIOL, AND FERROUS FUMARATE 1MG-20(24)
1 KIT ORAL DAILY
Qty: 84 CAPSULE | Refills: 3 | Status: SHIPPED | OUTPATIENT
Start: 2017-04-28 | End: 2018-05-31

## 2017-04-28 NOTE — MR AVS SNAPSHOT
[facility]  [address], [address]  [facility] [address]  Phone: [phone]  Fax: [phone]                  Earlene Nassar   2017 9:45 AM   Postpartum Visit    Description:  Female : 1990   Provider:  Martin Sky MD   [facility]           Reason for Visit     Postpartum Care           Diagnoses this Visit        Comments    Encounter for postpartum visit    -  Primary            To Do List           Future Appointments        Provider Department Dept Phone    5/3/2017 8:00 AM Lauren Shepley, PT [facility] [phone]    2017 2:15 PM Martin Sky MD [facility] [phone]    5/10/2017 3:30 PM Adilia Lu DO [facility] [phone]      Goals (5 Years of Data)     None       These Medications        Disp Refills Start End    norethindrone-e.estradiol-iron (TAYTULLA) 1 mg-20 mcg (24)/75 mg (4) Cap 84 capsule 3 2017     Take 1 tablet by mouth once daily. - Oral    Pharmacy: [facility]/pharmacy #[id number] - [place] - [address] [facility] Ph #: [phone]       Notes to Pharmacy: BIN:[id number] GRP:[id number] PCN:DOMINIC ID:[id number]      [facility] On Call     [facility] Care [facility] -  Assistance  Unless otherwise directed by your provider, please contact [facility], our nurse care line that is available for  assistance.     Registered nurses in the [facility] provide: appointment scheduling, clinical advisement, health education, and other advisory services.  Call: [phone] (toll free)               Medications           Message regarding Medications     Verify the changes and/or additions to your medication regime listed below are the same as discussed with your clinician today.  If any of these changes or additions are incorrect, please notify your healthcare provider.        START taking these NEW medications        Refills     norethindrone-e.estradiol-iron (TAYTULLA) 1 mg-20 mcg (24)/75 mg (4) Cap 3    Sig: Take 1 tablet by mouth once daily.    Class: Normal    Route: Oral           Verify that the below list of medications is an accurate representation of the medications you are currently taking.  If none reported, the list may be blank. If incorrect, please contact your healthcare provider. Carry this list with you in case of emergency.           Current Medications     docusate sodium (COLACE) 100 MG capsule Take 1 capsule (100 mg total) by mouth 2 (two) times daily.    estradiol (ESTRACE) 0.01 % (0.1 mg/gram) vaginal cream Apply pea size amount nightly to external area.    sulfamethoxazole-trimethoprim 400-80mg (BACTRIM) 400-80 mg per tablet Take 1 tablet by mouth 2 (two) times daily.    ibuprofen (ADVIL,MOTRIN) 600 MG tablet Take 1 tablet (600 mg total) by mouth every 6 (six) hours as needed for Pain (cramping).    ketorolac (TORADOL) 10 mg tablet Take 1 tablet (10 mg total) by mouth every 6 (six) hours.    norethindrone-e.estradiol-iron (TAYTULLA) 1 mg-20 mcg (24)/75 mg (4) Cap Take 1 tablet by mouth once daily.    oxycodone-acetaminophen (PERCOCET) 5-325 mg per tablet Take 1 tablet by mouth every 4 (four) hours as needed for Pain.           Clinical Reference Information           Prenatal Vitals     Enc. Date GA Prenatal Vitals Prenatal Pulse Pain Level Urine Albumin/Glucose Edema Presentation Dilation/Effacement/Station    4/28/17 39w0d 120/84 / 57.3 kg (126 lb 5.2 oz)           4/20/17 39w0d 122/76 / 58.8 kg (129 lb 10.1 oz)           4/7/17 39w0d 124/82 / 58.4 kg (128 lb 13.7 oz)           3/30/17 39w0d 116/94 (A) / 59.4 kg (130 lb 15.3 oz)           3/17/17 39w0d Admission Dept: [facility]    3/15/17 38w5d 118/78 / 68.3 kg (150 lb 11 oz)  / 150 / Present  0 Negative / Negative None / None / None  5 / 90    3/9/17 37w6d 122/84 / 67.8 kg (149 lb 7.9 oz) 37 cm / 145 / Present  0 Negative / Negative None / None / None Vertex 5  "/ 90 / -2    3/3/17 37w0d 114/82 / 67 kg (147 lb 13.1 oz) 37 cm / 145 / Present   Negative / Negative None / None / None Vertex 4.5 / 90 / -2    2/22/17 35w5d 120/82 / 65.6 kg (144 lb 11.7 oz) 36 cm / 152 / Present   Negative / Negative None / None / None Vertex 4 / 90 / -2    2/18/17 35w1d Admission Dept: [facility] OB ER    2/17/17 35w0d 118/80 / 64.5 kg (142 lb 3.2 oz) 34 cm / 142 / Present   Negative / Negative None / None / None Vertex 4 / 90 / -2    2/13/17 34w3d 120/90 (A) / 63.6 kg (140 lb 5.2 oz)  /  / Present   Negative / Negative None / None / None  1    2/7/17 33w4d 120/82 / 64.1 kg (141 lb 5 oz) 33 cm / 148 / Present   Negative / Negative None / None / None  1    2/3/17 33w0d 110/76 / 64.8 kg (142 lb 15.5 oz) 33 cm / 142 / Present   Negative / Negative None / None / None      1/20/17 31w0d 110/70 / 62 kg (136 lb 11 oz) 31 cm / 148 / Present   Negative / Negative None / None / None      1/6/17 29w0d 118/76 / 60.1 kg (132 lb 6.2 oz) 28 cm / 133 / Present   Negative / Negative None / None / None      12/2/16 24w0d 110/68 / 56.1 kg (123 lb 10.9 oz) 24 cm / 132 / Present   Negative / Negative None / None / None      11/4/16 20w0d 112/70 / 53.4 kg (117 lb 9.9 oz) 20 cm / 148 / Present   Negative / Negative None / None / None      10/6/16 15w6d 104/62 / 51 kg (112 lb 5.2 oz)  / 156 / Absent   Negative / Negative None / None / None      9/6/16 11w4d 116/78  /  / Absent   Negative / Negative None / None / None      8/19/16 7w6d 116/76 / 47.8 kg (105 lb 6.1 oz)              Number of babies: 1   Height: 5' 3" (1.6 m)       Your Vitals Were     BP Height Weight Last Period BMI    120/84 5' 3" (1.6 m) 57.3 kg (126 lb 5.2 oz) 06/17/2016 (Exact Date) 22.38 kg/m2      Allergies as of 4/28/2017     Codeine      Immunizations Administered on Date of Encounter - 4/28/2017     None      Language Assistance Services     ATTENTION: Language assistance services are available, free of charge. Please call [phone].  "     ATENCIÓN: Si habla español, tiene a peres disposición servicios gratuitos de asistencia lingüística. [employer]is al [phone].     CHÚ Ý: N?u b?n nói Ti?ng Vi?t, có các d?ch v? h? tr? ngôn ng? mi?n phí dành cho b?n. G?i s? [phone].         [facility] complies with applicable Federal civil rights laws and does not discriminate on the basis of race, color, national origin, age, disability, or sex.

## 2017-04-28 NOTE — PROGRESS NOTES
"CC: 6 wk pp visit.     Earlene Nassar is a 26 y.o. female  s/p  complicated by 4th degree laceration repair.  No fecal incontinence, since starting the estrace she has had significant improvement with pain.  She felt like it worked the next day.  Still using it nightly.  Is going for pelvic floor PT.  Currently not breastfeeding wants ocps for contraception for when she is cleared for intercourse of which she is aware she is to continue pelvic rest.      No past medical history on file.    Past Surgical History:   Procedure Laterality Date    ADENOIDECTOMY      TONSILLECTOMY         OB History    Para Term  AB SAB TAB Ectopic Multiple Living   1 1 1      0 1      # Outcome Date GA Lbr Andrew/2nd Weight Sex Delivery Anes PTL Lv   1 Term 17 39w0d  3.402 kg (7 lb 8 oz) M Vag-Spont EPI N Y      Obstetric Comments   Menarche ~14       Family History   Problem Relation Age of Onset    No Known Problems Mother     Heart disease Father     No Known Problems Maternal Grandmother     No Known Problems Maternal Grandfather     No Known Problems Paternal Grandmother     No Known Problems Paternal Grandfather     Breast cancer Neg Hx     Colon cancer Neg Hx     Ovarian cancer Neg Hx     Cervical cancer Neg Hx     Endometrial cancer Neg Hx     Vaginal cancer Neg Hx        Social History   Substance Use Topics    Smoking status: Never Smoker    Smokeless tobacco: Never Used    Alcohol use Yes      Comment: occasionally       /84  Ht 5' 3" (1.6 m)  Wt 57.3 kg (126 lb 5.2 oz)  LMP 2016 (Exact Date)  Breastfeeding? No  BMI 22.38 kg/m2    ROS:  GENERAL: Denies weight gain or weight loss. Feeling well overall.   SKIN: Denies rash or lesions.   HEAD: Denies head injury or headache.   NODES: Denies enlarged lymph nodes.   CHEST: Denies chest pain or shortness of breath.   CARDIOVASCULAR: Denies palpitations or left sided chest pain.   ABDOMEN: No abdominal pain, " constipation, diarrhea, nausea, vomiting or rectal bleeding.   URINARY: No frequency, dysuria, hematuria, or burning on urination.  REPRODUCTIVE: See HPI.   BREASTS: denies pain, lumps, or nipple discharge.   HEMATOLOGIC: No easy bruisability or excessive bleeding.  MUSCULOSKELETAL: Denies joint pain or swelling.   NEUROLOGIC: Denies syncope or weakness.   PSYCHIATRIC: Denies depression, anxiety or mood swings.    Physical Exam:    APPEARANCE: Well nourished, well developed, in no acute distress.  AFFECT: WNL, alert and oriented x 3  SKIN: No acne or hirsutism  NECK: Neck symmetric without masses or thyromegaly  NODES: No inguinal, cervical, axillary, or femoral lymph node enlargement  CHEST: Good respiratory effect  ABDOMEN: Soft.  No tenderness or masses.  No hepatosplenomegaly.  No hernias.  PELVIC: Normal external genitalia without lesions.  Normal hair distribution.  Adequate perineal body, normal urethral meatus.  Vagina moist and well rugated without lesions or discharge.  Cervix pink, without lesions, discharge or tenderness.  No significant cystocele or rectocele.  Bimanual exam shows uterus to be normal size, regular, mobile and nontender.  Adnexa without masses or tenderness.    EXTREMITIES: No edema.    ASSESSMENT AND PLAN    Earlene was seen today for postpartum care.    Diagnoses and all orders for this visit:    Encounter for postpartum visit  -     norethindrone-e.estradiol-iron (TAYTULLA) 1 mg-20 mcg (24)/75 mg (4) Cap; Take 1 tablet by mouth once daily.    Her laceration repair looks great and significantly better since starting estrace.  Currently is alternating between me and urogyn on weekly basis.      Return in about 1 week (around 5/5/2017).

## 2017-05-03 ENCOUNTER — CLINICAL SUPPORT (OUTPATIENT)
Dept: REHABILITATION | Facility: HOSPITAL | Age: 27
End: 2017-05-03
Attending: OBSTETRICS & GYNECOLOGY
Payer: COMMERCIAL

## 2017-05-03 DIAGNOSIS — R15.2 FECAL URGENCY: Primary | ICD-10-CM

## 2017-05-03 PROCEDURE — 97161 PT EVAL LOW COMPLEX 20 MIN: CPT | Mod: PN

## 2017-05-03 PROCEDURE — 97110 THERAPEUTIC EXERCISES: CPT | Mod: PN

## 2017-05-03 NOTE — PROGRESS NOTES
Patient: Earlene [facility] #:  [id number]    Date of treatment: 05/03/2017   Time in: 8:00  Time out: 9:02  # Visits: 1  Auth: 60  Referral expiration: 12/31/17  POC expiration: 6/26/17)    Outpatient Physical Therapy   Initial Evaluation    Earlene is a 26 y.o. female evaluated on 05/03/2017    Physician:  Adilia Lu,*   Diagnosis:   Encounter Diagnosis   Name Primary?    Fecal urgency Yes        Treatment ordered: PT    Medical History: No past medical history on file.     Surgical History:   Past Surgical History:   Procedure Laterality Date    ADENOIDECTOMY      TONSILLECTOMY          Medications:   Current Outpatient Prescriptions   Medication Sig    docusate sodium (COLACE) 100 MG capsule Take 1 capsule (100 mg total) by mouth 2 (two) times daily.    estradiol (ESTRACE) 0.01 % (0.1 mg/gram) vaginal cream Apply pea size amount nightly to external area.    ibuprofen (ADVIL,MOTRIN) 600 MG tablet Take 1 tablet (600 mg total) by mouth every 6 (six) hours as needed for Pain (cramping).    ketorolac (TORADOL) 10 mg tablet Take 1 tablet (10 mg total) by mouth every 6 (six) hours.    norethindrone-e.estradiol-iron (TAYTULLA) 1 mg-20 mcg (24)/75 mg (4) Cap Take 1 tablet by mouth once daily.    oxycodone-acetaminophen (PERCOCET) 5-325 mg per tablet Take 1 tablet by mouth every 4 (four) hours as needed for Pain.     No current facility-administered medications for this visit.        Allergies:   Review of patient's allergies indicates:   Allergen Reactions    Codeine Other (See Comments)     Per pt's mother she had rash as a child but took narcotics after Tonsillectomy without problems      Precautions: universal   OB/GYN History: 1 vaginal delivery with episiotomy and 4th degree laceration, birth weight 7.8 ounces  Bladder/Bowel History: denies       Barriers to Learning: none  Educational/Spiritual/Cultural needs: none  Environmental Barriers: none noted  Abuse/Neglect: no  "signs  Nutritional Status: well developed well nourished  Fall Risk: none    Subjective     Pt is 7 weeks s/p  with episiotomy and 4th degree laceration. Pt states she was in miserable pain up until ~ 5 weeks after her delivery. She states she was on a laxative to protect her stitches and was having fecal urgency and just not making it to the bathroom in time (due to the laxative). At 4-5 weeks she was taken off of the laxative and is now on a stool softener and fiber which she states is really helping. Pt denies pain currently, Dr. Sky put her on Estrace and she has been pain free since then.     Pain: Patient reports 0/10 with 0 being the lowest and 10 being the highest. 6-7/10 first 5 weeks after delivering, was taking ibprofen and when it wore off could barely walk    Pain or lack of sensation with vaginal/pelvic exam? Denies prior to current episode  Sexually active? Pt is on pelvic rest as her incision heals  Contraception? Taytulla    Previous treatment included: none    Frequency of Urination: Daytime: ~4        Nighttime: 0-1    Urine Stream: normal, pt states she "doesn't feel it as much"    Bladder Leakage: No    Do you have difficulty initiating your urine stream? No  Do you feel like you are emptying completely? Yes    Frequency of Bowel Movements: once a day    Do you have difficulty initiating a bowel movement? No  Colon Leakage: No but still having urgency and needing to get to the bathroom immediately  Stool consistency? Soft and formed    Form of Protection: none    Types/amount of Fluid Intake: water  Current Exercise: none    Activity limitations? Denies (tries to use the bathroom before leaving the house)  Participation limitations? 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  Living situation? Lives with  and      Patient's Goals: regain muscle strength    Objective     ORTHO SCREEN  Posture: " WFL    Pelvic Alignment: no deviations noted in supine    ABDOMINALS  Scarring: none     Diastasis: 2 fingers   Abdominal strength: Rectus: WFL     Transverse: weak       VAGINAL PELVIC FLOOR EXAM  EXTERNAL ASSESSMENT  Skin condition/scarrinth degree laceration incision; no irritation, minimal erythema, no swelling noted, healing well  Sensation: incisional sensitivity and tenderness noted, appropriate to current phase of healing  Introitus: gaping   Voluntary Contraction: visible lift  Voluntary Relaxation: drop  Involuntary Contraction: visible lift  Bearing down: visible buldge    INTERNAL ASSESSMENT, abbreviated due to laceration and pelvic rest  Pain: none  Sensation: able to localize pressure appropriately, able to distinguish pressure from side to side, and able to feel the difference between lift and drop    Vaginal Vault: roomy  Muscle Bulk: atrophy  Muscle Power: 2/5  Muscle Endurance: 8 sec  # Reps To Fatigue: NT    Fast Contractions: able to perform, slow   Quality of Contraction: slow rise, decreased hold  Specificity: WNL   Coordination: WNL    TREATMENT    Pt received individual therapeutic exercise for 20 minutes including: pt education regarding PFM function and anatomy using hand mirror for pt to see lift/drop/bulge and orient to her perineal and PF anatomy; discussed tissue healing and healing of her incision; pt was instructed that she may slowly and gently begin desensitization with very minimal pressure but to stop if any irritation or increased tenderness occurs until she is fully healed; will follow-up at her next session. Discussed general anatomy/physiology of urinary/bowel system; discussed POC with patient, instructed in purpose of physical therapy and the benefits/risks of treatment; instructed in risks of refusing treatment. Pt agreed to tx plan. Pt was instructed in HEP including kegals and TA activation (see pt instructions); she verbalized understanding and was provided with a  handout. Earlene demonstrated and verbalized understanding of all education provided.     Assessment     Mrs. Nassar is a 26 y.o. female referred to outpatient physical therapy with a medical diagnosis of fecal urgency. Pt presents today with 4th degree laceration due to childbirth with incisional healing observed and appropriate amount of tenderness to palpation. Pt displays significant PFM and core weakness with diastasis. Pt will benefit from physcial therapy services to address these deficits in order to maximize pain free functional independence. The following goals were discussed with the patient and patient is in agreement with them as to be addressed in the treatment plan. Pt was given a HEP as described above. Pt verbally understood the instructions as they were given and demonstrated proper form and technique during therapy. Pt was advise to perform these exercises free of pain and to stop performing them if pain occurs.     Prognosis: good  No educational, cultural, or spiritual needs identified.    History  Co-morbidities and personal factors that may impact the plan of care Examination  Body Structures and Functions, activity limitations and participation restrictions that may impact the plan of care Clinical Presentation   Decision Making/ Complexity Score   Co-morbidities:   none  Personal Factors:   none Body Regions: Pelvis, trunk, hips    Body Systems: Musculoskeletal, neuromuscular, integumentary    Activity limitations: denies    Participation Restrictions: denies   Stable   Low       GOALS  Short Term Goals: 6 weeks (6/14/17)  1. Pt will demonstrate increase in PF muscle strength to 3/5 to improve core strength and stability in order to improve overall functional strength and prevent future injury.  2. Pt will be independent with scar mobilization techniques and perineal massage in order to minimize adhesions and pain moving forward and with return to normal sexual activity when  appropriate.  3. Pt will tolerate HEP to improve impairments and independence with ADL's.    Long Term Goals: 12 weeks (6/26/17)  1. Pt will demonstrate increase in PFM strength and endurance to 3/5 for 10 x 10'' kegals in order to improve functional strength and stability to prevent future injury.  2. Normalize scar mobility in order to decrease pain and return to normal pain free sexual activity.  3. Pt will report decrease in fecal urgency.  4. Pt will be independent with HEP and self management.    CMS Impairment/Limitation/Restriction for Bowel Leakage Survey  Status Limitation G-Code CMS Severity Modifier  Intake 56% 44% Current Status CK - At least 40 percent but less than 60 percent  Predicted 68% 32% Goal Status+ CJ - At least 20 percent but less than 40 percent  Plan     Pt will be treated by physical therapy 1 time a week for 3 months for Pt Education, HEP, therapeutic exercises, neuromuscular re-education, therapeutic activity, gait training, manual therapy, and modalities (including SEMG) PRN to achieve established goals.

## 2017-05-03 NOTE — PATIENT INSTRUCTIONS
Home Exercises: 5/3/17    Kegals in Supine:    1. Lie comfortably and take a few deep breaths to relax the body. It is nice to put some support under the knees and lie on some padding on the floor if possible.    2. Squeeze and lift your pelvic floor muscles as if trying not to pee (i.e. Kegal). Hold for 10 seconds.     3. Relax and drop pelvic floor muscles completely, allow belly, inner thighs, and gluts to relax completely. 10 seconds.    4. Repeat 10 times, 3 times per day.    TA Activation:    1. Lying comfortably with some support under the knees, draw your belly button down towards your spine. Hold 5 seconds.  2. Relax  3. Repeat 2 sets of 10 per day.

## 2017-05-04 ENCOUNTER — TELEPHONE (OUTPATIENT)
Dept: UROGYNECOLOGY | Facility: CLINIC | Age: 27
End: 2017-05-04

## 2017-05-04 NOTE — TELEPHONE ENCOUNTER
Spoke with Dr Asya Davison MA regarding rescheduling pt's apt until next week. Laney stated she'll consult with Dr Sky and notify pt if any changes will be made.

## 2017-05-05 ENCOUNTER — POSTPARTUM VISIT (OUTPATIENT)
Dept: OBSTETRICS AND GYNECOLOGY | Facility: CLINIC | Age: 27
End: 2017-05-05
Attending: OBSTETRICS & GYNECOLOGY
Payer: COMMERCIAL

## 2017-05-05 VITALS
WEIGHT: 127.19 LBS | BODY MASS INDEX: 22.54 KG/M2 | SYSTOLIC BLOOD PRESSURE: 118 MMHG | HEIGHT: 63 IN | DIASTOLIC BLOOD PRESSURE: 66 MMHG

## 2017-05-05 DIAGNOSIS — N76.0 ACUTE VAGINITIS: ICD-10-CM

## 2017-05-05 DIAGNOSIS — Z01.419 ENCOUNTER FOR GYNECOLOGICAL EXAMINATION: Primary | ICD-10-CM

## 2017-05-05 DIAGNOSIS — Z12.4 ENCOUNTER FOR PAPANICOLAOU SMEAR FOR CERVICAL CANCER SCREENING: ICD-10-CM

## 2017-05-05 PROCEDURE — 99395 PREV VISIT EST AGE 18-39: CPT | Mod: 24,S$GLB,, | Performed by: OBSTETRICS & GYNECOLOGY

## 2017-05-05 PROCEDURE — 87480 CANDIDA DNA DIR PROBE: CPT

## 2017-05-05 PROCEDURE — 88175 CYTOPATH C/V AUTO FLUID REDO: CPT

## 2017-05-05 PROCEDURE — 99999 PR PBB SHADOW E&M-EST. PATIENT-LVL III: CPT | Mod: PBBFAC,,, | Performed by: OBSTETRICS & GYNECOLOGY

## 2017-05-05 NOTE — MR AVS SNAPSHOT
[facility]  [address], [address]  [facility] [address]  Phone: [phone]  Fax: [phone]                  Earlene Nassar   2017 2:15 PM   Postpartum Visit    Description:  Female : 1990   Provider:  Martin Sky MD   Department:  [facility]           Reason for Visit     Postpartum Care           Diagnoses this Visit        Comments    Encounter for gynecological examination    -  Primary     Encounter for Papanicolaou smear for cervical cancer screening         Acute vaginitis                To Do List           Future Appointments        Provider Department Dept Phone    2017 10:00 AM Lauren Shepley, PT [facility] [phone]    2017 2:30 PM Kamala Alatorre MD [facility] [phone]    2017 11:00 AM Lauren Shepley, PT [facility] [phone]    2017 11:00 AM Lauren Shepley, PT [facility] [phone]      Goals (5 Years of Data)     None      Follow-Up and Disposition     Return in about 2 weeks (around 2017).    Follow-up and Disposition History      [facility]     [facility] Nurse Care Line - 24/ Assistance  Unless otherwise directed by your provider, please contact [facility], our nurse care line that is available for  assistance.     Registered nurses in the [facility] provide: appointment scheduling, clinical advisement, health education, and other advisory services.  Call: [phone] (toll free)               Medications           Message regarding Medications     Verify the changes and/or additions to your medication regime listed below are the same as discussed with your clinician today.  If any of these changes or additions are incorrect, please notify your healthcare provider.             Verify that the below list of medications is an accurate representation of the medications you  are currently taking.  If none reported, the list may be blank. If incorrect, please contact your healthcare provider. Carry this list with you in case of emergency.           Current Medications     docusate sodium (COLACE) 100 MG capsule Take 1 capsule (100 mg total) by mouth 2 (two) times daily.    estradiol (ESTRACE) 0.01 % (0.1 mg/gram) vaginal cream Apply pea size amount nightly to external area.    norethindrone-e.estradiol-iron (TAYTULLA) 1 mg-20 mcg (24)/75 mg (4) Cap Take 1 tablet by mouth once daily.    ibuprofen (ADVIL,MOTRIN) 600 MG tablet Take 1 tablet (600 mg total) by mouth every 6 (six) hours as needed for Pain (cramping).    ketorolac (TORADOL) 10 mg tablet Take 1 tablet (10 mg total) by mouth every 6 (six) hours.    oxycodone-acetaminophen (PERCOCET) 5-325 mg per tablet Take 1 tablet by mouth every 4 (four) hours as needed for Pain.           Clinical Reference Information           Prenatal Vitals     Enc. Date GA Prenatal Vitals Prenatal Pulse Pain Level Urine Albumin/Glucose Edema Presentation Dilation/Effacement/Station    5/5/17 39w0d 118/66 / 57.7 kg (127 lb 3.3 oz)           4/28/17 39w0d 120/84 / 57.3 kg (126 lb 5.2 oz)           4/20/17 39w0d 122/76 / 58.8 kg (129 lb 10.1 oz)           4/7/17 39w0d 124/82 / 58.4 kg (128 lb 13.7 oz)           3/30/17 39w0d 116/94 (A) / 59.4 kg (130 lb 15.3 oz)           3/17/17 39w0d Admission Dept: [facility]    3/15/17 38w5d 118/78 / 68.3 kg (150 lb 11 oz)  / 150 / Present  0 Negative / Negative None / None / None  5 / 90    3/9/17 37w6d 122/84 / 67.8 kg (149 lb 7.9 oz) 37 cm / 145 / Present  0 Negative / Negative None / None / None Vertex 5 / 90 / -2    3/3/17 37w0d 114/82 / 67 kg (147 lb 13.1 oz) 37 cm / 145 / Present   Negative / Negative None / None / None Vertex 4.5 / 90 / -2    2/22/17 35w5d 120/82 / 65.6 kg (144 lb 11.7 oz) 36 cm / 152 / Present   Negative / Negative None / None / None Vertex 4 / 90 / -2    2/18/17 35w1d Admission Dept: [facility]  "ER    2/17/17 35w0d 118/80 / 64.5 kg (142 lb 3.2 oz) 34 cm / 142 / Present   Negative / Negative None / None / None Vertex 4 / 90 / -2    2/13/17 34w3d 120/90 (A) / 63.6 kg (140 lb 5.2 oz)  /  / Present   Negative / Negative None / None / None  1    2/7/17 33w4d 120/82 / 64.1 kg (141 lb 5 oz) 33 cm / 148 / Present   Negative / Negative None / None / None  1    2/3/17 33w0d 110/76 / 64.8 kg (142 lb 15.5 oz) 33 cm / 142 / Present   Negative / Negative None / None / None      1/20/17 31w0d 110/70 / 62 kg (136 lb 11 oz) 31 cm / 148 / Present   Negative / Negative None / None / None      1/6/17 29w0d 118/76 / 60.1 kg (132 lb 6.2 oz) 28 cm / 133 / Present   Negative / Negative None / None / None      12/2/16 24w0d 110/68 / 56.1 kg (123 lb 10.9 oz) 24 cm / 132 / Present   Negative / Negative None / None / None      11/4/16 20w0d 112/70 / 53.4 kg (117 lb 9.9 oz) 20 cm / 148 / Present   Negative / Negative None / None / None      10/6/16 15w6d 104/62 / 51 kg (112 lb 5.2 oz)  / 156 / Absent   Negative / Negative None / None / None      9/6/16 11w4d 116/78  /  / Absent   Negative / Negative None / None / None      8/19/16 7w6d 116/76 / 47.8 kg (105 lb 6.1 oz)              Number of babies: 1   Height: 5' 3" (1.6 m)       Your Vitals Were     BP Height Weight Last Period BMI    118/66 5' 3" (1.6 m) 57.7 kg (127 lb 3.3 oz) 06/17/2016 (Exact Date) 22.53 kg/m2      Allergies as of 5/5/2017     Codeine      Immunizations Administered on Date of Encounter - 5/5/2017     None      Orders Placed During Today's Visit      Normal Orders This Visit    Liquid-based pap smear, screening     Vaginosis Screen by DNA Probe       Language Assistance Services     ATTENTION: Language assistance services are available, free of charge. Please call [phone].      ATENCIÓN: Si habla lizet, tiene a peres disposición servicios gratuitos de asistencia lingüística. Llame al [phone].     CHÚ Ý: N?u b?n nói Ti?ng Vi?t, có các d?ch v? h? tr? " lis boo? mi?n phí dành cho b?n. G?i s? [phone].         [facility] complies with applicable Federal civil rights laws and does not discriminate on the basis of race, color, national origin, age, disability, or sex.

## 2017-05-05 NOTE — PROGRESS NOTES
"CC: Well woman exam    Earlene Nassar is a 26 y.o. female  presents for a well woman exam.  She is also being followed by urogyn for 4th degree laceration.  Started pelvic floor PT, feels irritated at stitches but wants to be checked for yeast.  Wants to know when she can have sex again, is on ocps and using estrace.     History reviewed. No pertinent past medical history.    Past Surgical History:   Procedure Laterality Date    ADENOIDECTOMY      TONSILLECTOMY         OB History    Para Term  AB SAB TAB Ectopic Multiple Living   1 1 1      0 1      # Outcome Date GA Lbr Andrew/2nd Weight Sex Delivery Anes PTL Lv   1 Term 17 39w0d  3.402 kg (7 lb 8 oz) M Vag-Spont EPI N Y      Obstetric Comments   Menarche ~14       Family History   Problem Relation Age of Onset    No Known Problems Mother     Heart disease Father     No Known Problems Maternal Grandmother     No Known Problems Maternal Grandfather     No Known Problems Paternal Grandmother     No Known Problems Paternal Grandfather     Breast cancer Neg Hx     Colon cancer Neg Hx     Ovarian cancer Neg Hx     Cervical cancer Neg Hx     Endometrial cancer Neg Hx     Vaginal cancer Neg Hx        Social History   Substance Use Topics    Smoking status: Never Smoker    Smokeless tobacco: Never Used    Alcohol use Yes      Comment: occasionally       /66  Ht 5' 3" (1.6 m)  Wt 57.7 kg (127 lb 3.3 oz)  LMP 2016 (Exact Date)  Breastfeeding? No  BMI 22.53 kg/m2    ROS:  GENERAL: Denies weight gain or weight loss. Feeling well overall.   SKIN: Denies rash or lesions.   HEAD: Denies head injury or headache.   NODES: Denies enlarged lymph nodes.   CHEST: Denies chest pain or shortness of breath.   CARDIOVASCULAR: Denies palpitations or left sided chest pain.   ABDOMEN: No abdominal pain, constipation, diarrhea, nausea, vomiting or rectal bleeding.   URINARY: No frequency, dysuria, hematuria, or burning on " urination.  REPRODUCTIVE: See HPI.   BREASTS: The patient performs breast self-examination and denies pain, lumps, or nipple discharge.   HEMATOLOGIC: No easy bruisability or excessive bleeding.  MUSCULOSKELETAL: Denies joint pain or swelling.   NEUROLOGIC: Denies syncope or weakness.   PSYCHIATRIC: Denies depression, anxiety or mood swings.    Physical Exam:    APPEARANCE: Well nourished, well developed, in no acute distress.  AFFECT: WNL, alert and oriented x 3  SKIN: No acne or hirsutism  NECK: Neck symmetric without masses or thyromegaly  NODES: No inguinal, cervical, axillary, or femoral lymph node enlargement  CHEST: Good respiratory effect  ABDOMEN: Soft.  No tenderness or masses.  No hepatosplenomegaly.  No hernias.  BREASTS: Symmetrical, no skin changes or visible lesions.  No palpable masses, nipple discharge bilaterally.  PELVIC: Normal external genitalia without lesions.  Normal hair distribution.  Adequate perineal body, normal urethral meatus.  Vagina moist and well rugated without lesions or discharge.  Cervix pink, without lesions, discharge or tenderness.  No significant cystocele or rectocele.  Bimanual exam shows uterus to be normal size, regular, mobile and nontender.  Adnexa without masses or tenderness.    EXTREMITIES: No edema.    ASSESSMENT AND PLAN  1. Encounter for gynecological examination     2. Encounter for Papanicolaou smear for cervical cancer screening  Liquid-based pap smear, screening       Patient was counseled today on A.C.S. Pap guidelines and recommendations for yearly pelvic exams, mammograms and monthly self breast exams; to see her PCP for other health maintenance.     Normal exam, reassurance given, affirm done. Defer to urogyn for release pelvic rest.      Return in about 2 weeks (around 5/19/2017).

## 2017-05-09 NOTE — PLAN OF CARE
Patient: Earlene ZEPEDA [facility] #:  [id number]    Date of treatment: 05/03/2017   Time in: 8:00  Time out: 9:02  # Visits: 1  Auth: 60  Referral expiration: 12/31/17  POC expiration: 6/26/17)    Outpatient Physical Therapy   Initial Evaluation    Earlene is a 26 y.o. female evaluated on 05/03/2017    Physician:  Adilia Lu,*   Diagnosis:   Encounter Diagnosis   Name Primary?    Fecal urgency Yes        Treatment ordered: PT    Medical History: No past medical history on file.     Surgical History:   Past Surgical History:   Procedure Laterality Date    ADENOIDECTOMY      TONSILLECTOMY          Medications:   Current Outpatient Prescriptions   Medication Sig    docusate sodium (COLACE) 100 MG capsule Take 1 capsule (100 mg total) by mouth 2 (two) times daily.    estradiol (ESTRACE) 0.01 % (0.1 mg/gram) vaginal cream Apply pea size amount nightly to external area.    ibuprofen (ADVIL,MOTRIN) 600 MG tablet Take 1 tablet (600 mg total) by mouth every 6 (six) hours as needed for Pain (cramping).    ketorolac (TORADOL) 10 mg tablet Take 1 tablet (10 mg total) by mouth every 6 (six) hours.    norethindrone-e.estradiol-iron (TAYTULLA) 1 mg-20 mcg (24)/75 mg (4) Cap Take 1 tablet by mouth once daily.    oxycodone-acetaminophen (PERCOCET) 5-325 mg per tablet Take 1 tablet by mouth every 4 (four) hours as needed for Pain.     No current facility-administered medications for this visit.        Allergies:   Review of patient's allergies indicates:   Allergen Reactions    Codeine Other (See Comments)     Per pt's mother she had rash as a child but took narcotics after Tonsillectomy without problems      Precautions: universal   OB/GYN History: 1 vaginal delivery with episiotomy and 4th degree laceration, birth weight 7.8 ounces  Bladder/Bowel History: denies       Barriers to Learning: none  Educational/Spiritual/Cultural needs: none  Environmental Barriers: none noted  Abuse/Neglect: no  "signs  Nutritional Status: well developed well nourished  Fall Risk: none    Subjective     Pt is 7 weeks s/p  with episiotomy and 4th degree laceration. Pt states she was in miserable pain up until ~ 5 weeks after her delivery. She states she was on a laxative to protect her stitches and was having fecal urgency and just not making it to the bathroom in time (due to the laxative). At 4-5 weeks she was taken off of the laxative and is now on a stool softener and fiber which she states is really helping. Pt denies pain currently, Dr. Sky put her on Estrace and she has been pain free since then.     Pain: Patient reports 0/10 with 0 being the lowest and 10 being the highest. 6-7/10 first 5 weeks after delivering, was taking ibprofen and when it wore off could barely walk    Pain or lack of sensation with vaginal/pelvic exam? Denies prior to current episode  Sexually active? Pt is on pelvic rest as her incision heals  Contraception? Taytulla    Previous treatment included: none    Frequency of Urination: Daytime: ~4        Nighttime: 0-1    Urine Stream: normal, pt states she "doesn't feel it as much"    Bladder Leakage: No    Do you have difficulty initiating your urine stream? No  Do you feel like you are emptying completely? Yes    Frequency of Bowel Movements: once a day    Do you have difficulty initiating a bowel movement? No  Colon Leakage: No but still having urgency and needing to get to the bathroom immediately  Stool consistency? Soft and formed    Form of Protection: none    Types/amount of Fluid Intake: water  Current Exercise: none    Activity limitations? Denies (tries to use the bathroom before leaving the house)  Participation limitations? 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  Living situation? Lives with  and      Patient's Goals: regain muscle strength    Objective     ORTHO SCREEN  Posture: " WFL    Pelvic Alignment: no deviations noted in supine    ABDOMINALS  Scarring: none     Diastasis: 2 fingers   Abdominal strength: Rectus: WFL     Transverse: weak       VAGINAL PELVIC FLOOR EXAM  EXTERNAL ASSESSMENT  Skin condition/scarrinth degree laceration incision; no irritation, minimal erythema, no swelling noted, healing well  Sensation: incisional sensitivity and tenderness noted, appropriate to current phase of healing  Introitus: gaping   Voluntary Contraction: visible lift  Voluntary Relaxation: drop  Involuntary Contraction: visible lift  Bearing down: visible buldge    INTERNAL ASSESSMENT, abbreviated due to laceration and pelvic rest  Pain: none  Sensation: able to localize pressure appropriately, able to distinguish pressure from side to side, and able to feel the difference between lift and drop    Vaginal Vault: roomy  Muscle Bulk: atrophy  Muscle Power: 2/5  Muscle Endurance: 8 sec  # Reps To Fatigue: NT    Fast Contractions: able to perform, slow   Quality of Contraction: slow rise, decreased hold  Specificity: WNL   Coordination: WNL    TREATMENT    Pt received individual therapeutic exercise for 20 minutes including: pt education regarding PFM function and anatomy using hand mirror for pt to see lift/drop/bulge and orient to her perineal and PF anatomy; discussed tissue healing and healing of her incision; pt was instructed that she may slowly and gently begin desensitization with very minimal pressure but to stop if any irritation or increased tenderness occurs until she is fully healed; will follow-up at her next session. Discussed general anatomy/physiology of urinary/bowel system; discussed POC with patient, instructed in purpose of physical therapy and the benefits/risks of treatment; instructed in risks of refusing treatment. Pt agreed to tx plan. Pt was instructed in HEP including kegals and TA activation (see pt instructions); she verbalized understanding and was provided with a  handout. Earlene demonstrated and verbalized understanding of all education provided.     Assessment     Mrs. Nassar is a 26 y.o. female referred to outpatient physical therapy with a medical diagnosis of fecal urgency. Pt presents today with 4th degree laceration due to childbirth with incisional healing observed and appropriate amount of tenderness to palpation. Pt displays significant PFM and core weakness with diastasis. Pt will benefit from physcial therapy services to address these deficits in order to maximize pain free functional independence. The following goals were discussed with the patient and patient is in agreement with them as to be addressed in the treatment plan. Pt was given a HEP as described above. Pt verbally understood the instructions as they were given and demonstrated proper form and technique during therapy. Pt was advise to perform these exercises free of pain and to stop performing them if pain occurs.     Prognosis: good  No educational, cultural, or spiritual needs identified.    History  Co-morbidities and personal factors that may impact the plan of care Examination  Body Structures and Functions, activity limitations and participation restrictions that may impact the plan of care Clinical Presentation   Decision Making/ Complexity Score   Co-morbidities:   none  Personal Factors:   none Body Regions: Pelvis, trunk, hips    Body Systems: Musculoskeletal, neuromuscular, integumentary    Activity limitations: denies    Participation Restrictions: denies   Stable   Low       GOALS  Short Term Goals: 6 weeks (6/14/17)  1. Pt will demonstrate increase in PF muscle strength to 3/5 to improve core strength and stability in order to improve overall functional strength and prevent future injury.  2. Pt will be independent with scar mobilization techniques and perineal massage in order to minimize adhesions and pain moving forward and with return to normal sexual activity when  appropriate.  3. Pt will tolerate HEP to improve impairments and independence with ADL's.    Long Term Goals: 12 weeks (6/26/17)  1. Pt will demonstrate increase in PFM strength and endurance to 3/5 for 10 x 10'' kegals in order to improve functional strength and stability to prevent future injury.  2. Normalize scar mobility in order to decrease pain and return to normal pain free sexual activity.  3. Pt will report decrease in fecal urgency.  4. Pt will be independent with HEP and self management.    CMS Impairment/Limitation/Restriction for Bowel Leakage Survey  Status Limitation G-Code CMS Severity Modifier  Intake 56% 44% Current Status CK - At least 40 percent but less than 60 percent  Predicted 68% 32% Goal Status+ CJ - At least 20 percent but less than 40 percent  Plan     Pt will be treated by physical therapy 1 time a week for 3 months for Pt Education, HEP, therapeutic exercises, neuromuscular re-education, therapeutic activity, gait training, manual therapy, and modalities (including SEMG) PRN to achieve established goals.

## 2017-05-11 ENCOUNTER — CLINICAL SUPPORT (OUTPATIENT)
Dept: REHABILITATION | Facility: HOSPITAL | Age: 27
End: 2017-05-11
Attending: OBSTETRICS & GYNECOLOGY
Payer: COMMERCIAL

## 2017-05-11 DIAGNOSIS — N81.89 PELVIC FLOOR WEAKNESS: ICD-10-CM

## 2017-05-11 DIAGNOSIS — R15.2 FECAL URGENCY: Primary | ICD-10-CM

## 2017-05-11 PROCEDURE — 97110 THERAPEUTIC EXERCISES: CPT | Mod: PN

## 2017-05-11 NOTE — PROGRESS NOTES
Patient: Earlene ZEPEDA [facility] #: [id number]   Diagnosis:   Encounter Diagnoses   Name Primary?    Fecal urgency Yes    Pelvic floor weakness       Date of start of care: 5/3/17  Date of treatment: 05/11/2017   Time in: 10:01  Time out: 10:50  Visit #: 2  Visits auth: 60  Auth expiration: 12/31/17  POC expiration: 6/26/17  Outpatient Physical Therapy   Daily Note    Subjective     Pt states she was a little bit irritated after her initial visit. She reports compliance with her HEP.    Pain: Current: 0/10    Objective     TREATMENT    Pt received individual therapeutic exercise to develop strength, coordination, endurance, motor control and core stability for 49 minutes including:    - Crunches with towel for diastasis x 10  - TA activation with verbal and tactile cueing to ensure proper technique; pt with poor carry over from last session but good ability to activate today with cueing  - 2 x 10 x 10'' kegals; normal resting baseline, good WR rise, good holding ability, complete derecruitment  - Side lying clams x 10, GTB  - Supine ball squeeze 10 x 5''    Pt tolerated treatment well with no visible adverse affects. No skin irritation, errythemia, or other adverse affects observed from electrode placement.    Education: Discussed progression of plan of care with patient; educated pt in activity modification; instructed pt to continue with current HEP including kegals (30x/day and TA activation) with addition of diastasis crunches, clams, and ball squeeze; pt demonstrated and verbalized understanding and was provided with a handout.     Assessment     Pt tolerated session well today without visible adverse effects. Pt with good carry over of PFM exercises via SEMG with good coordination and recruitment. Pt continues to present with significant PFM and core weakness with mild diastasis; she will benefit from continued skilled PT intervention in order to maximize pain free functional independence. Will assess  incision site and continue to progress core strengthening at pt's next visit.      Pt is making good progress towards established goals.  No spiritual, cultural, or educational barriers to learning identified.    GOALS  Short Term Goals: 6 weeks (6/14/17)  1. Pt will demonstrate increase in PF muscle strength to 3/5 to improve core strength and stability in order to improve overall functional strength and prevent future injury.  2. Pt will be independent with scar mobilization techniques and perineal massage in order to minimize adhesions and pain moving forward and with return to normal sexual activity when appropriate.  3. Pt will tolerate HEP to improve impairments and independence with ADL's.     Long Term Goals: 12 weeks (6/26/17)  1. Pt will demonstrate increase in PFM strength and endurance to 3/5 for 10 x 10'' kegals in order to improve functional strength and stability to prevent future injury.  2. Normalize scar mobility in order to decrease pain and return to normal pain free sexual activity.  3. Pt will report decrease in fecal urgency.  4. Pt will be independent with HEP and self management.     CMS Impairment/Limitation/Restriction for Bowel Leakage Survey  Status Limitation G-Code CMS Severity Modifier  Intake 56% 44% Current Status CK - At least 40 percent but less than 60 percent  Predicted 68% 32% Goal Status+ CJ - At least 20 percent but less than 40 percent  Plan     Continue with established POC, working toward PT goals.

## 2017-05-12 ENCOUNTER — TELEPHONE (OUTPATIENT)
Dept: UROGYNECOLOGY | Facility: CLINIC | Age: 27
End: 2017-05-12

## 2017-05-12 NOTE — TELEPHONE ENCOUNTER
Spoke to pt and R/S her appointment to 05/18 and informed her I'd send her appointment letter in the mail. Pt voiced understanding and call ended.

## 2017-05-12 NOTE — TELEPHONE ENCOUNTER
----- Message from Krissy Cobos sent at 5/12/2017 12:19 PM CDT -----  Contact: self  Patient is requesting to reschedule her appointment for today, patient can be reached at [phone]

## 2017-05-18 ENCOUNTER — OFFICE VISIT (OUTPATIENT)
Dept: UROGYNECOLOGY | Facility: CLINIC | Age: 27
End: 2017-05-18
Payer: COMMERCIAL

## 2017-05-18 ENCOUNTER — CLINICAL SUPPORT (OUTPATIENT)
Dept: REHABILITATION | Facility: HOSPITAL | Age: 27
End: 2017-05-18
Attending: OBSTETRICS & GYNECOLOGY
Payer: COMMERCIAL

## 2017-05-18 VITALS
BODY MASS INDEX: 22.11 KG/M2 | HEIGHT: 63 IN | WEIGHT: 124.75 LBS | DIASTOLIC BLOOD PRESSURE: 70 MMHG | SYSTOLIC BLOOD PRESSURE: 110 MMHG

## 2017-05-18 DIAGNOSIS — R15.2 FECAL URGENCY: Primary | ICD-10-CM

## 2017-05-18 DIAGNOSIS — N81.89 PELVIC FLOOR WEAKNESS: ICD-10-CM

## 2017-05-18 DIAGNOSIS — T14.8XXD DELAYED WOUND HEALING: Primary | ICD-10-CM

## 2017-05-18 PROCEDURE — 99999 PR PBB SHADOW E&M-EST. PATIENT-LVL III: CPT | Mod: PBBFAC,,, | Performed by: OBSTETRICS & GYNECOLOGY

## 2017-05-18 PROCEDURE — 97110 THERAPEUTIC EXERCISES: CPT | Mod: PN

## 2017-05-18 PROCEDURE — 1160F RVW MEDS BY RX/DR IN RCRD: CPT | Mod: S$GLB,,, | Performed by: OBSTETRICS & GYNECOLOGY

## 2017-05-18 PROCEDURE — 99213 OFFICE O/P EST LOW 20 MIN: CPT | Mod: S$GLB,,, | Performed by: OBSTETRICS & GYNECOLOGY

## 2017-05-18 NOTE — PROGRESS NOTES
Patient: Earlene ZEPEDA [facility] #: [id number]   Diagnosis:   Encounter Diagnoses   Name Primary?    Pelvic floor weakness     Fecal urgency Yes      Date of start of care: 5/3/17  Date of treatment: 05/18/2017   Time in: 10:58  Time out: 11:58  Visit #: 3  Visits auth: 60  Auth expiration: 12/31/17  POC expiration: 6/26/17  Outpatient Physical Therapy   Daily Note    Subjective     Pt states she is doing well today. Pt verbalizes concerns about diastasis recti; pt reports her exercises are going well at home, it can be hard to do them every day. Pt has an appointment with Dr. Alatorre later today.  Pain: Current: 0/10    Objective     TREATMENT    Pt received individual therapeutic exercise to develop strength, coordination, endurance, motor control and core stability for 60 minutes including:    - Shuttle x 30, 1.5 cords  - Side lying clams 2 x 10, GTB  - Supine ball squeeze 2 x 10 x 5''  - Supine SLR 2 x 10  - TA activation with supine march 2 x 10 ea  - Quadruped TA activation 10 x 5''  - Crunches with towel for diastasis 2 x 10  - Quadruped fire hydrant x 10 ea   - Lateral heel taps 2 x 10 with TB to prevent valgus collapse  - Supine piriformis stretch 3 x 30''  - Half kneeling hip flexor stretch 3 x 30''    Not performed:  - 2 x 10 x 10'' kegals wt SEMG assist; normal resting baseline, good WR rise, good holding ability, complete derecruitment    Pt tolerated treatment well with no visible adverse affects. No skin irritation, errythemia, or other adverse affects observed from electrode placement.    Education: Discussed progression of plan of care with patient; educated pt in activity modification; instructed pt to continue with current HEP including kegals (30x/day and TA activation), towel crunches, clams, and ball squeeze with addition of lateral step downs, bridges, piriformis stretch and half kneeling hip flexor stretch; pt demonstrated and verbalized understanding and was provided with a handout.      Assessment     Pt tolerated session well today without visible adverse effects. Progressed core strengthening program with updated HEP in order to improve core strength and diastasis recti; pt continues to require some verbal cueing for TA activation but with good carry over during today's visit. Pt is seeing Dr. Alatorre today; will reassess her incision at her next visit and begin scar mobilization if appropriate and progress PFM exercises. Pt continues to present with significant PFM and core weakness with mild diastasis; she will benefit from continued skilled PT intervention in order to maximize pain free functional independence.     Pt is making good progress towards established goals.  No spiritual, cultural, or educational barriers to learning identified.    GOALS  Short Term Goals: 6 weeks (6/14/17)  1. Pt will demonstrate increase in PF muscle strength to 3/5 to improve core strength and stability in order to improve overall functional strength and prevent future injury.  2. Pt will be independent with scar mobilization techniques and perineal massage in order to minimize adhesions and pain moving forward and with return to normal sexual activity when appropriate.  3. Pt will tolerate HEP to improve impairments and independence with ADL's.     Long Term Goals: 12 weeks (6/26/17)  1. Pt will demonstrate increase in PFM strength and endurance to 3/5 for 10 x 10'' kegals in order to improve functional strength and stability to prevent future injury.  2. Normalize scar mobility in order to decrease pain and return to normal pain free sexual activity.  3. Pt will report decrease in fecal urgency.  4. Pt will be independent with HEP and self management.     CMS Impairment/Limitation/Restriction for Bowel Leakage Survey  Status Limitation G-Code CMS Severity Modifier  Intake 56% 44% Current Status CK - At least 40 percent but less than 60 percent  Predicted 68% 32% Goal Status+ CJ - At least 20 percent but less  than 40 percent  Plan     Continue with established POC, working toward PT goals.

## 2017-05-18 NOTE — PROGRESS NOTES
Subjective:       Patient ID: Earlene Nassar is a 26 y.o. female.    Chief Complaint:  Encopresis      History of Present Illness: 27yo P1 who is 4 weeks postpartum from  complicated by episiotomy & 4th degree laceration. After started taking miralax once per day for prevention, while taking that she had urgency with fecal incontinence with soft stool every other day. She stopped miralax 5 days ago, states her BM are now more formed and hard with incomplete emptying. Since stopping miralax denies fecal incontinence or smearing, but still complains of urgency. Denies splinting. She is not currently taking any stool softeners or fiber. Denies gas incontinence. Pain is well controlled with scheduled ibuprofen q6h. Denies vaginal discharge or drainage from tear. Denies stress or urge urinary incontinence. She was referred by Dr. Sky for evaluation.     Interval history: Seen by Dr. Sky last week started on bactrim and vaginal estrogen with resolution of vaginal pain no longer using Toradol.  She has persistent fecal urgency and rare incontinence. Currently taking the stool softener and fiber, not straining with BM's. Denies fever or chills.       TODAY:  --taking colace BID  --started PT on [address] [place] with Shepley--3 visits; wants to start working on scar tissue  --Started daily fiber:  2 pills/day: stool is well-formed, has some post-fecal urgency with easy completion of defecation  --pain: still having a little sensitivity but better; no intercourse yet; using estrace -- stopped breastfeeding 5 weeks ago  --no VB, discharge.  Got 1st menses 1 week ago.  Was started on OCPs.       HPI    GYN & OB History  No LMP recorded.   Date of Last Pap: 5/10/2017    OB History    Para Term  AB SAB TAB Ectopic Multiple Living   1 1 1      0 1      # Outcome Date GA Lbr Andrew/2nd Weight Sex Delivery Anes PTL Lv   1 Term 17 39w0d  3.402 kg (7 lb 8 oz) M Vag-Spont EPI N Y      Obstetric Comments   Menarche ~14      Past Surgical History:   Procedure Laterality Date    ADENOIDECTOMY      TONSILLECTOMY         Current Outpatient Prescriptions:     docusate sodium (COLACE) 100 MG capsule, Take 1 capsule (100 mg total) by mouth 2 (two) times daily., Disp: 60 capsule, Rfl: 2    estradiol (ESTRACE) 0.01 % (0.1 mg/gram) vaginal cream, Apply pea size amount nightly to external area., Disp: 1 Tube, Rfl: 1    norethindrone-e.estradiol-iron (TAYTULLA) 1 mg-20 mcg (24)/75 mg (4) Cap, Take 1 tablet by mouth once daily., Disp: 84 capsule, Rfl: 3    ketorolac (TORADOL) 10 mg tablet, Take 1 tablet (10 mg total) by mouth every 6 (six) hours., Disp: 20 tablet, Rfl: 0    wound dressings Pste, Apply 1 application topically every morning., Disp: 71 g, Rfl: 3    Review of patient's allergies indicates:   Allergen Reactions    Codeine Other (See Comments)     Per pt's mother she had rash as a child but took narcotics after Tonsillectomy without problems         Review of Systems  Review of Systems   Constitutional: Negative.  Negative for activity change, appetite change, chills, diaphoresis, fatigue, fever and unexpected weight change.   HENT: Negative.    Eyes: Negative.    Respiratory: Negative.  Negative for apnea, cough and wheezing.    Cardiovascular: Negative.  Negative for chest pain and palpitations.   Gastrointestinal: Positive for rectal pain. Negative for abdominal distention, abdominal pain, anal bleeding, blood in stool, constipation, diarrhea, nausea and vomiting.        Fecal urgency/incontinence with soft stool   Endocrine: Negative.    Genitourinary: Positive for vaginal bleeding and vaginal discharge (lochia). Negative for decreased urine volume, difficulty urinating, dyspareunia, dysuria, enuresis, flank pain, frequency, genital sores, hematuria, menstrual problem, pelvic pain and urgency.   Musculoskeletal: Negative for back pain and gait problem.   Skin: Negative for color change, pallor, rash and wound.    Allergic/Immunologic: Negative for immunocompromised state.   Neurological: Negative.  Negative for dizziness and speech difficulty.   Hematological: Negative for adenopathy.   Psychiatric/Behavioral: Negative for agitation, behavioral problems, confusion and sleep disturbance.           Objective:     Physical Exam   Constitutional: She is oriented to person, place, and time. She appears well-developed and well-nourished. No distress.   Cardiovascular: Normal rate and regular rhythm.    Pulmonary/Chest: Effort normal.   Abdominal: Soft. She exhibits no distension and no mass. There is no tenderness. There is no rebound and no guarding.   Genitourinary:   Genitourinary Comments: Posterior vaginal wall with sutures in place, healing well, no erythema or exudate.Normal lochia present.   Musculoskeletal: Normal range of motion.   Neurological: She is alert and oriented to person, place, and time.   Skin: Skin is warm and dry.   Psychiatric: She has a normal mood and affect.        Pelvic:  Introitus:  0.5 very superficial area at mid posterior fourchette still healing--sensitive; no other TTP; internal vaginal well-healed, NT  Perineum: well healed  Rectal:  EAS intact--feels well healed, NT, no rectocele    Assessment:        1. Delayed wound healing    2. Pelvic floor weakness             Plan:      1. 4th degree laceration w/fecal incontinence   - laceration healing well  - continue colace 100mg BID  - continue fiber supplements  - continue PT with Lauren Shepley    - continue estrogen cream on perineum and just inside vagina every night  - start triad cream : small amount with finger along perineum and just inside vagina, rosario at area that's tender    2.  RTC 2-3 weeks.     Female Pelvic Medicine and Reconstructive Surgery  [facility] [place], [place]

## 2017-05-18 NOTE — PATIENT INSTRUCTIONS
1. 4th degree laceration w/fecal incontinence   - laceration healing well  - continue colace 100mg BID  - continue fiber supplements  - continue PT with Lauren Shepley    - continue estrogen cream on perineum and just inside vagina every night  - start triad cream : small amount with finger along perineum and just inside vagina, rosario at area that's tender    2.  RTC 2-3 weeks.

## 2017-05-18 NOTE — MR AVS SNAPSHOT
[facility]  [address] [address]  Phone: [phone]                  Earlene Nassar   2017 3:30 PM   Office Visit    Description:  Female : 1990   Provider:  Kamala Alatorre MD   Department:  [facility]           Reason for Visit     Encopresis           Diagnoses this Visit        Comments    Delayed wound healing    -  Primary            To Do List           Future Appointments        Provider Department Dept Phone    2017 10:15 AM [facility] [phone]    2017 11:00 AM Lauren Shepley, PT [facility] [phone]    2017 11:00 AM Lauren Shepley, PT [facility] [phone]      Goals (5 Years of Data)     None       These Medications        Disp Refills Start End    wound dressings Pste 71 g 3 2017     Apply 1 application topically every morning. - Topical (Top)    Pharmacy: [id number] - [place], [place] - [address] [facility] Ph #: [phone]       Notes to Pharmacy: Triad hydrophilic wound paste      [facility]     [facility] Care Line -  Assistance  Unless otherwise directed by your provider, please contact [facility], our nurse care line that is available for / assistance.     Registered nurses in the [facility] provide: appointment scheduling, clinical advisement, health education, and other advisory services.  Call: [phone] (toll free)               Medications           Message regarding Medications     Verify the changes and/or additions to your medication regime listed below are the same as discussed with your clinician today.  If any of these changes or additions are incorrect, please notify your healthcare provider.        START taking these NEW medications        Refills    wound dressings Pste 3    Sig: Apply 1 application topically every morning.    Class: Print  "   Route: Topical (Top)      STOP taking these medications     ibuprofen (ADVIL,MOTRIN) 600 MG tablet Take 1 tablet (600 mg total) by mouth every 6 (six) hours as needed for Pain (cramping).    oxycodone-acetaminophen (PERCOCET) 5-325 mg per tablet Take 1 tablet by mouth every 4 (four) hours as needed for Pain.           Verify that the below list of medications is an accurate representation of the medications you are currently taking.  If none reported, the list may be blank. If incorrect, please contact your healthcare provider. Carry this list with you in case of emergency.           Current Medications     docusate sodium (COLACE) 100 MG capsule Take 1 capsule (100 mg total) by mouth 2 (two) times daily.    estradiol (ESTRACE) 0.01 % (0.1 mg/gram) vaginal cream Apply pea size amount nightly to external area.    ketorolac (TORADOL) 10 mg tablet Take 1 tablet (10 mg total) by mouth every 6 (six) hours.    norethindrone-e.estradiol-iron (TAYTULLA) 1 mg-20 mcg (24)/75 mg (4) Cap Take 1 tablet by mouth once daily.    wound dressings Pste Apply 1 application topically every morning.           Clinical Reference Information           Your Vitals Were     BP Height Weight BMI       110/70 5' 3" (1.6 m) 56.6 kg (124 lb 12.5 oz) 22.1 kg/m2       Blood Pressure          Most Recent Value    BP  110/70      Allergies as of 5/18/2017     Codeine      Immunizations Administered on Date of Encounter - 5/18/2017     None      Instructions    1. 4th degree laceration w/fecal incontinence   - laceration healing well  - continue colace 100mg BID  - continue fiber supplements  - continue PT with Lauren Shepley    - continue estrogen cream on perineum and just inside vagina every night  - start triad cream : small amount with finger along perineum and just inside vagina, rosario at area that's tender    2.  RTC 2-3 weeks.        Language Assistance Services     ATTENTION: Language assistance services are available, free of charge. " Please call [phone].      ATENCIÓN: Si habla español, tiene a peres disposición servicios gratuitos de asistencia lingüística. Llame al [phone].     CHÚ Ý: N?u b?n nói Ti?ng Vi?t, có các d?ch v? h? tr? ngôn ng? mi?n phí dành cho b?n. G?i s? [phone].         [facility] complies with applicable Federal civil rights laws and does not discriminate on the basis of race, color, national origin, age, disability, or sex.

## 2017-05-19 PROBLEM — T14.8XXD DELAYED WOUND HEALING: Status: ACTIVE | Noted: 2017-05-19

## 2017-05-19 PROBLEM — R15.2 FECAL URGENCY: Status: RESOLVED | Noted: 2017-05-03 | Resolved: 2017-05-19

## 2017-05-22 ENCOUNTER — OFFICE VISIT (OUTPATIENT)
Dept: OBSTETRICS AND GYNECOLOGY | Facility: CLINIC | Age: 27
End: 2017-05-22
Attending: OBSTETRICS & GYNECOLOGY
Payer: COMMERCIAL

## 2017-05-22 VITALS
BODY MASS INDEX: 22.3 KG/M2 | WEIGHT: 125.88 LBS | SYSTOLIC BLOOD PRESSURE: 124 MMHG | HEIGHT: 63 IN | DIASTOLIC BLOOD PRESSURE: 84 MMHG

## 2017-05-22 DIAGNOSIS — T14.8XXD DELAYED WOUND HEALING: Primary | ICD-10-CM

## 2017-05-22 PROCEDURE — 99999 PR PBB SHADOW E&M-EST. PATIENT-LVL II: CPT | Mod: PBBFAC,,, | Performed by: OBSTETRICS & GYNECOLOGY

## 2017-05-22 PROCEDURE — 99213 OFFICE O/P EST LOW 20 MIN: CPT | Mod: S$GLB,,, | Performed by: OBSTETRICS & GYNECOLOGY

## 2017-05-25 ENCOUNTER — CLINICAL SUPPORT (OUTPATIENT)
Dept: REHABILITATION | Facility: HOSPITAL | Age: 27
End: 2017-05-25
Attending: OBSTETRICS & GYNECOLOGY
Payer: COMMERCIAL

## 2017-05-25 DIAGNOSIS — N81.89 PELVIC FLOOR WEAKNESS: Primary | ICD-10-CM

## 2017-05-25 DIAGNOSIS — R15.2 FECAL URGENCY: ICD-10-CM

## 2017-05-25 PROCEDURE — 97110 THERAPEUTIC EXERCISES: CPT | Mod: PN

## 2017-05-25 NOTE — PROGRESS NOTES
"CC: follow up     Earlene Nassar is a 26 y.o. female  follow up wound care.  No complaint today.  Started on ocps.  First cycle was heavy.      History reviewed. No pertinent past medical history.    Past Surgical History:   Procedure Laterality Date    ADENOIDECTOMY      TONSILLECTOMY         OB History    Para Term  AB Living   1 1 1   1   SAB TAB Ectopic Multiple Live Births      0 1      # Outcome Date GA Lbr Andrew/2nd Weight Sex Delivery Anes PTL Lv   1 Term 17 39w0d  3.402 kg (7 lb 8 oz) M Vag-Spont EPI N JJ      Obstetric Comments   Menarche ~14       Family History   Problem Relation Age of Onset    No Known Problems Mother     Heart disease Father     No Known Problems Maternal Grandmother     No Known Problems Maternal Grandfather     No Known Problems Paternal Grandmother     No Known Problems Paternal Grandfather     Breast cancer Neg Hx     Colon cancer Neg Hx     Ovarian cancer Neg Hx     Cervical cancer Neg Hx     Endometrial cancer Neg Hx     Vaginal cancer Neg Hx        Social History   Substance Use Topics    Smoking status: Never Smoker    Smokeless tobacco: Never Used    Alcohol use Yes      Comment: occasionally       /84   Ht 5' 3" (1.6 m)   Wt 57.1 kg (125 lb 14.1 oz)   LMP 2017   Breastfeeding? No   BMI 22.30 kg/m²     ROS:  GENERAL: Denies weight gain or weight loss. Feeling well overall.   SKIN: Denies rash or lesions.   HEAD: Denies head injury or headache.   NODES: Denies enlarged lymph nodes.   CHEST: Denies chest pain or shortness of breath.   CARDIOVASCULAR: Denies palpitations or left sided chest pain.   ABDOMEN: No abdominal pain, constipation, diarrhea, nausea, vomiting or rectal bleeding.   URINARY: No frequency, dysuria, hematuria, or burning on urination.  REPRODUCTIVE: See HPI.   BREASTS: denies pain, lumps, or nipple discharge.   HEMATOLOGIC: No easy bruisability or excessive bleeding.  MUSCULOSKELETAL: Denies " joint pain or swelling.   NEUROLOGIC: Denies syncope or weakness.   PSYCHIATRIC: Denies depression, anxiety or mood swings.    Physical Exam:    APPEARANCE: Well nourished, well developed, in no acute distress.  AFFECT: WNL, alert and oriented x 3  SKIN: No acne or hirsutism  NECK: Neck symmetric without masses or thyromegaly  NODES: No inguinal, cervical, axillary, or femoral lymph node enlargement  CHEST: Good respiratory effect  ABDOMEN: Soft.  No tenderness or masses.  No hepatosplenomegaly.  No hernias.  PELVIC: Normal external genitalia without lesions.  Normal hair distribution.  Adequate perineal body, normal urethral meatus.  Vagina moist and well rugated without lesions or discharge.  Cervix pink, without lesions, discharge or tenderness.  No significant cystocele or rectocele.  Bimanual exam shows uterus to be normal size, regular, mobile and nontender.  Adnexa without masses or tenderness.    EXTREMITIES: No edema.    ASSESSMENT AND PLAN    Diagnoses and all orders for this visit:    Delayed wound healing    Healing well.  Area posterior to posterior forchette still delayed otherwise looks great.  Continue PT.  Give ocps more time.  Reassurance heavy menses normal after having a child.      Return in about 4 weeks (around 6/19/2017).

## 2017-05-25 NOTE — PROGRESS NOTES
Patient: Earlene ZEPEDA [facility] #: [id number]   Diagnosis:   No diagnosis found.   Date of start of care: 5/3/17  Date of treatment: 05/25/2017   Time in: 11:01  Time out: 12:03  Total treatment time: 62 minutes  Visit #: 4  Visits auth: 60  Auth expiration: 12/31/17  POC expiration: 6/26/17  Outpatient Physical Therapy   Daily Note    Subjective     Pt states she is doing well today; she recently had appointment with Dr. Alatorre and one with Dr. Sky, her incision continues to heal but not fully healed yet. Pt remains on pelvic rest. Pt reports compliance with her HEP, no difficulty. She states she has been having some pain on her R side, low back region that has been there since having her baby.    Pain: Current: 0/10    Objective     TREATMENT    Pt received individual therapeutic exercise to develop strength, coordination, endurance, motor control and core stability for 60 minutes including:    - 10 x 10'' kegals in supine wth SEMG assist; normal resting baseline, good WR rise, good holding ability, complete derecruitment  - 10 x 10'' kegals in sitting with SEMG assist; normal resting baseline, fair WR rise, fair holding, complete derecruitment  - MET to correct pelvic obliquity  - Supported child's pose x 5 min  - Legs up the wall x 3 min    Not performed:  - Shuttle x 30, 1.5 cords  - Side lying clams 2 x 10, GTB  - Supine ball squeeze 2 x 10 x 5''  - Supine SLR 2 x 10  - TA activation with supine march 2 x 10 ea  - Quadruped TA activation 10 x 5''  - Crunches with towel for diastasis 2 x 10  - Quadruped fire hydrant x 10 ea   - Lateral heel taps 2 x 10 with TB to prevent valgus collapse  - Supine piriformis stretch 3 x 30''  - Half kneeling hip flexor stretch 3 x 30''    Pt tolerated treatment well with no visible adverse affects. No skin irritation, errythemia, or other adverse affects observed from electrode placement.    Education: Discussed progression of plan of care with patient; educated pt in  activity modification; instructed pt to continue with current HEP including kegals (30x/day and TA activation), towel crunches, clams, and ball squeeze, lateral step downs, bridges, piriformis stretch, half kneeling hip flexor stretch with addition of supported child's pose and legs up the wall/in a chair; pt demonstrated and verbalized understanding and was provided with a handout.     Assessment     Pt tolerated session well today without visible adverse effects. Her incision continues to heal, posteriorly > anteriorly with some redness noted anteriorly today; pt continues with pelvic rest, will begin scar tissue mobilization and desensitization once pt is clear by Dr. Alatorre/Dr. Sky. Pt reports having some R sided LB pain today, localized over medial iliac crests bilaterally. Will continue to monitor and tx accordingly. Pt is making good progress with core strengthening and displays good compliance. Progressed to performing PFM exercises in sitting today. Pt continues to present with PFM and core weakness with mild diastasis; she will continue to benefit from continued skilled PT intervention in order to maximize pain free functional independence. Will schedule next appointment in two weeks to give pt time to continue with core strengthening independently and to allow for incision healing.     Pt is making good progress towards established goals.  No spiritual, cultural, or educational barriers to learning identified.    GOALS  Short Term Goals: 6 weeks (6/14/17)  1. Pt will demonstrate increase in PF muscle strength to 3/5 to improve core strength and stability in order to improve overall functional strength and prevent future injury.  2. Pt will be independent with scar mobilization techniques and perineal massage in order to minimize adhesions and pain moving forward and with return to normal sexual activity when appropriate.  3. Pt will tolerate HEP to improve impairments and independence with ADL's.     Long  Term Goals: 12 weeks (6/26/17)  1. Pt will demonstrate increase in PFM strength and endurance to 3/5 for 10 x 10'' kegals in order to improve functional strength and stability to prevent future injury.  2. Normalize scar mobility in order to decrease pain and return to normal pain free sexual activity.  3. Pt will report decrease in fecal urgency.  4. Pt will be independent with HEP and self management.     CMS Impairment/Limitation/Restriction for Bowel Leakage Survey  Status Limitation G-Code CMS Severity Modifier  Intake 56% 44% Current Status CK - At least 40 percent but less than 60 percent  Predicted 68% 32% Goal Status+ CJ - At least 20 percent but less than 40 percent  Plan     Continue with established POC, working toward PT goals.

## 2017-05-31 ENCOUNTER — OFFICE VISIT (OUTPATIENT)
Dept: UROGYNECOLOGY | Facility: CLINIC | Age: 27
End: 2017-05-31
Payer: COMMERCIAL

## 2017-05-31 ENCOUNTER — LAB VISIT (OUTPATIENT)
Dept: LAB | Facility: OTHER | Age: 27
End: 2017-05-31
Attending: OBSTETRICS & GYNECOLOGY
Payer: COMMERCIAL

## 2017-05-31 VITALS
SYSTOLIC BLOOD PRESSURE: 110 MMHG | BODY MASS INDEX: 22.11 KG/M2 | DIASTOLIC BLOOD PRESSURE: 80 MMHG | HEIGHT: 63 IN | WEIGHT: 124.75 LBS

## 2017-05-31 DIAGNOSIS — R42 LIGHTHEADED: Primary | ICD-10-CM

## 2017-05-31 DIAGNOSIS — N81.89 PELVIC FLOOR WEAKNESS: ICD-10-CM

## 2017-05-31 DIAGNOSIS — L76.82 INCISIONAL PAIN: ICD-10-CM

## 2017-05-31 DIAGNOSIS — T14.8XXD DELAYED WOUND HEALING: ICD-10-CM

## 2017-05-31 DIAGNOSIS — R42 LIGHTHEADED: ICD-10-CM

## 2017-05-31 DIAGNOSIS — N92.1 MENORRHAGIA WITH IRREGULAR CYCLE: ICD-10-CM

## 2017-05-31 DIAGNOSIS — R15.2 FECAL URGENCY: ICD-10-CM

## 2017-05-31 LAB
BASOPHILS # BLD AUTO: 0.02 K/UL
BASOPHILS NFR BLD: 0.3 %
DIFFERENTIAL METHOD: NORMAL
EOSINOPHIL # BLD AUTO: 0.1 K/UL
EOSINOPHIL NFR BLD: 0.8 %
ERYTHROCYTE [DISTWIDTH] IN BLOOD BY AUTOMATED COUNT: 12.8 %
HCT VFR BLD AUTO: 40.8 %
HGB BLD-MCNC: 13.3 G/DL
LYMPHOCYTES # BLD AUTO: 3.1 K/UL
LYMPHOCYTES NFR BLD: 47.2 %
MCH RBC QN AUTO: 29.2 PG
MCHC RBC AUTO-ENTMCNC: 32.6 %
MCV RBC AUTO: 90 FL
MONOCYTES # BLD AUTO: 0.7 K/UL
MONOCYTES NFR BLD: 11.1 %
NEUTROPHILS # BLD AUTO: 2.7 K/UL
NEUTROPHILS NFR BLD: 40.6 %
PLATELET # BLD AUTO: 310 K/UL
PMV BLD AUTO: 10.1 FL
RBC # BLD AUTO: 4.56 M/UL
WBC # BLD AUTO: 6.55 K/UL

## 2017-05-31 PROCEDURE — 36415 COLL VENOUS BLD VENIPUNCTURE: CPT

## 2017-05-31 PROCEDURE — 99999 PR PBB SHADOW E&M-EST. PATIENT-LVL III: CPT | Mod: PBBFAC,,, | Performed by: OBSTETRICS & GYNECOLOGY

## 2017-05-31 PROCEDURE — 99213 OFFICE O/P EST LOW 20 MIN: CPT | Mod: S$GLB,,, | Performed by: OBSTETRICS & GYNECOLOGY

## 2017-05-31 PROCEDURE — 85025 COMPLETE CBC W/AUTO DIFF WBC: CPT

## 2017-05-31 RX ORDER — LIDOCAINE HYDROCHLORIDE 20 MG/ML
JELLY TOPICAL
Qty: 30 ML | Refills: 3 | Status: SHIPPED | OUTPATIENT
Start: 2017-05-31 | End: 2017-08-24

## 2017-05-31 NOTE — PROGRESS NOTES
Subjective:       Patient ID: Earlene Nassar is a 27 y.o. female.    Chief Complaint:  Vaginal Bleeding      History of Present Illness: 25yo P1 who is 4 weeks postpartum from  complicated by episiotomy & 4th degree laceration. After started taking miralax once per day for prevention, while taking that she had urgency with fecal incontinence with soft stool every other day. She stopped miralax 5 days ago, states her BM are now more formed and hard with incomplete emptying. Since stopping miralax denies fecal incontinence or smearing, but still complains of urgency. Denies splinting. She is not currently taking any stool softeners or fiber. Denies gas incontinence. Pain is well controlled with scheduled ibuprofen q6h. Denies vaginal discharge or drainage from tear. Denies stress or urge urinary incontinence. She was referred by Dr. Sky for evaluation.     Interval history: Seen by Dr. Sky last week started on bactrim and vaginal estrogen with resolution of vaginal pain no longer using Toradol.  She has persistent fecal urgency and rare incontinence. Currently taking the stool softener and fiber, not straining with BM's. Denies fever or chills.       TODAY:  --taking colace BID--BM are a little soft  --started PT on [address] [place] with Shepley--will see next week once restart internal  --Using daily fiber:  2 pills/day: stool is well-formed, has some post-fecal urgency with easy completion of defecation  --pain: still having a little sensitivity but better; no intercourse yet; using estrace -- stopped breastfeeding 5 weeks ago  -- Started OCP's 2017. Has been having menses x 8 days every 2 weeks.  Started VB day after OCP initiation.  X 8 days, then 2 weeks later x 8d, then did placebo pills w/o bleeding.  Has had some dizziness and fatigue.  No SOB/CP.      Vaginal Bleeding   The patient's primary symptoms include vaginal bleeding. The patient's pertinent negatives include no pelvic pain or vaginal discharge  (lochia). Pertinent negatives include no abdominal pain, back pain, chills, constipation, diarrhea, dysuria, fever, flank pain, frequency, hematuria, nausea, rash, urgency or vomiting. The vaginal bleeding is heavier than menses. She has been passing clots.   Started OCP's 2017. Has been having menses x 8 days every 2 weeks. Started VB day after OCP initiation.  X 8 days, then 2 weeks later x 8d.      GYN & OB History  Patient's last menstrual period was 2017.   Date of Last Pap: 5/10/2017    OB History    Para Term  AB Living   1 1 1   1   SAB TAB Ectopic Multiple Live Births      0 1      # Outcome Date GA Lbr Andrew/2nd Weight Sex Delivery Anes PTL Lv   1 Term 17 39w0d  3.402 kg (7 lb 8 oz) M Vag-Spont EPI N JJ      Obstetric Comments   Menarche ~14     Past Surgical History:   Procedure Laterality Date    ADENOIDECTOMY      TONSILLECTOMY         Current Outpatient Prescriptions:     docusate sodium (COLACE) 100 MG capsule, Take 1 capsule (100 mg total) by mouth 2 (two) times daily., Disp: 60 capsule, Rfl: 2    estradiol (ESTRACE) 0.01 % (0.1 mg/gram) vaginal cream, Apply pea size amount nightly to external area., Disp: 1 Tube, Rfl: 1    ketorolac (TORADOL) 10 mg tablet, Take 1 tablet (10 mg total) by mouth every 6 (six) hours., Disp: 20 tablet, Rfl: 0    norethindrone-e.estradiol-iron (TAYTULLA) 1 mg-20 mcg (24)/75 mg (4) Cap, Take 1 tablet by mouth once daily., Disp: 84 capsule, Rfl: 3    wound dressings Pste, Apply 1 application topically every morning., Disp: 71 g, Rfl: 3    lidocaine HCL 2% (XYLOCAINE) 2 % jelly, Dime-sized amount with finger to affected area up to 3x/day as needed for pain, Disp: 30 mL, Rfl: 3    Review of patient's allergies indicates:   Allergen Reactions    Codeine Other (See Comments)     Per pt's mother she had rash as a child but took narcotics after Tonsillectomy without problems         Review of Systems  Review of Systems   Constitutional:  Negative.  Negative for activity change, appetite change, chills, diaphoresis, fatigue, fever and unexpected weight change.   HENT: Negative.    Eyes: Negative.    Respiratory: Negative.  Negative for apnea, cough and wheezing.    Cardiovascular: Negative.  Negative for chest pain and palpitations.   Gastrointestinal: Positive for rectal pain. Negative for abdominal distention, abdominal pain, anal bleeding, blood in stool, constipation, diarrhea, nausea and vomiting.        Fecal urgency/incontinence with soft stool   Endocrine: Negative.    Genitourinary: Positive for vaginal bleeding. Negative for decreased urine volume, difficulty urinating, dyspareunia, dysuria, enuresis, flank pain, frequency, genital sores, hematuria, menstrual problem, pelvic pain, urgency and vaginal discharge (lochia).   Musculoskeletal: Negative for back pain and gait problem.   Skin: Negative for color change, pallor, rash and wound.   Allergic/Immunologic: Negative for immunocompromised state.   Neurological: Negative.  Negative for dizziness and speech difficulty.   Hematological: Negative for adenopathy.   Psychiatric/Behavioral: Negative for agitation, behavioral problems, confusion and sleep disturbance.           Objective:     Physical Exam   Constitutional: She is oriented to person, place, and time. She appears well-developed and well-nourished. No distress.   Cardiovascular: Normal rate and regular rhythm.    Pulmonary/Chest: Effort normal.   Abdominal: Soft. She exhibits no distension and no mass. There is no tenderness. There is no rebound and no guarding.   Genitourinary:   Genitourinary Comments: Posterior vaginal wall with sutures in place, healing well, no erythema or exudate.Normal lochia present.   Musculoskeletal: Normal range of motion.   Neurological: She is alert and oriented to person, place, and time.   Skin: Skin is warm and dry.   Psychiatric: She has a normal mood and affect.        Pelvic:  Introitus:  Was  having 0.5 very superficial area at mid posterior fourchette--completely healed now--still sensitive to superificial touch and deep palpation; no other TTP; internal vaginal well-healed, NT  Perineum: well healed  Rectal:  EAS intact--feels well healed, NT, no rectocele    Assessment:        1. Lightheaded    2. Menorrhagia with irregular cycle    3. Incisional pain    4. Fecal urgency    5. Pelvic floor weakness    6. Delayed wound healing             Plan:      1. 4th degree laceration w/fecal incontinence   - laceration healed well; still some pain at incision site  - stop colace 100mg BID; take if needed  - continue fiber supplements  - continue PT with Lauren Shepley -- start deep massage of tender area at vaginal opening 6 o'clock; use lidocaine gel (dime-sized amount) with finger on area just before PT, during PT, and after PT if needed.  Do not use more that 3x/day.    - continue estrogen cream on perineum and just inside vagina every night  - start triad cream : small amount with finger along perineum and just inside vagina, rosario at area that's tender    2.   Irregular menstrual bleeding with lightheadedness:  - check CBC  - consider continuous OCPs    3.   RTC 1 month     Female Pelvic Medicine and Reconstructive Surgery  [facility] [place], [place]

## 2017-06-01 ENCOUNTER — TELEPHONE (OUTPATIENT)
Dept: OBSTETRICS AND GYNECOLOGY | Facility: CLINIC | Age: 27
End: 2017-06-01

## 2017-06-04 PROBLEM — L76.82 INCISIONAL PAIN: Status: ACTIVE | Noted: 2017-06-04

## 2017-06-04 PROBLEM — N92.1 MENORRHAGIA WITH IRREGULAR CYCLE: Status: ACTIVE | Noted: 2017-06-04

## 2017-06-06 ENCOUNTER — TELEPHONE (OUTPATIENT)
Dept: OBSTETRICS AND GYNECOLOGY | Facility: CLINIC | Age: 27
End: 2017-06-06

## 2017-06-08 ENCOUNTER — CLINICAL SUPPORT (OUTPATIENT)
Dept: REHABILITATION | Facility: HOSPITAL | Age: 27
End: 2017-06-08
Attending: OBSTETRICS & GYNECOLOGY
Payer: COMMERCIAL

## 2017-06-08 DIAGNOSIS — N81.89 PELVIC FLOOR WEAKNESS: ICD-10-CM

## 2017-06-08 DIAGNOSIS — R15.2 FECAL URGENCY: Primary | ICD-10-CM

## 2017-06-08 PROCEDURE — 97140 MANUAL THERAPY 1/> REGIONS: CPT | Mod: PN

## 2017-06-08 NOTE — PROGRESS NOTES
Patient: Earlene ZEPEDA [facility] #: [id number]   Diagnosis:   Encounter Diagnoses   Name Primary?    Fecal urgency Yes    Pelvic floor weakness       Date of start of care: 5/3/17  Date of treatment: 06/08/2017   Time in: 10:05  Time out: 11:04  Total treatment time: 59 minutes  Visit #: 5  Visits auth: [id number]  Auth expiration: 12/31/17  POC expiration: 6/26/17  Outpatient Physical Therapy   Daily Note    Subjective     Pt states she is continuing to bleed in the mornings on most mornings. She was negative for anemia and will be seeing Dr. Alatorre and Dr. Sky at the end of the month, considering going off of birth control or changing type/dosage; she will discuss this with Dr. Sky. Pt states she is also continuing to have hip pain. Pt has been cleared for scar mobilization and soft tissue manual care.    Pain: Current: 0/10    Objective     TREATMENT    Pt received individual therapeutic exercise to develop strength, coordination, endurance, motor control and core stability for 0 minutes including:    Not performed:  - 10 x 10'' kegals in supine wth SEMG assist; normal resting baseline, good WR rise, good holding ability, complete derecruitment  - 10 x 10'' kegals in sitting with SEMG assist; normal resting baseline, fair WR rise, fair holding, complete derecruitment  - MET to correct pelvic obliquity  - Supported child's pose x 5 min  - Legs up the wall x 3 min    Pt tolerated treatment well with no visible adverse affects. No skin irritation, errythemia, or other adverse affects observed from electrode placement.    Pt received manual therapy for 59 minutes: scar mobilzation and gently soft tissue mobilization applied to introitus from ~ 3-9:00 with gentle downward stretch; pt verbalizes tenderness with massage, worked to pt tolerance with lidocaine gel per Dr. Alatorre's instructions; gentle soft tissue mobilization applied to abdomen    Education: Discussed progression of plan of care with patient; educated pt in  activity modification; instructed pt to continue with current HEP including diaphragmatic breathing, kegals (30x/day and TA activation), towel crunches, clams, and ball squeeze, lateral step downs, bridges, piriformis stretch, half kneeling hip flexor stretch, supported child's pose and legs up the wall/in a chair with addition of gentle perineal massage as performed in the clinic today; pt demonstrated and verbalized understanding and was provided with a handout.     Assessment     Pt tolerated session well today without visible adverse effects. Initiated soft tissue and scar mobilization today; pt continues with incisional tenderness which is expected to slowly improve with manual care. Decreased scar mobility noted with good tolerance to scar mobilization, will continue to progress. Pt was instructed in gentle self massage, she verbalized understanding. She was instructed to discontinue with any increase in pain or difficulty performing. Pt continues to c/o hip pain and back pain; will continue to progress core strengthening and assess hip joint at her next visit. She continues to display decreased dynamic pelvic and spinal stability due to decrease core strength as well as PFM weakness. Pt will continue to benefit from continued skilled PT intervention in order to maximize pain free functional independence.      Pt is making good progress towards established goals.  No spiritual, cultural, or educational barriers to learning identified.    GOALS  Short Term Goals: 6 weeks (6/14/17)  1. Pt will demonstrate increase in PF muscle strength to 3/5 to improve core strength and stability in order to improve overall functional strength and prevent future injury.  2. Pt will be independent with scar mobilization techniques and perineal massage in order to minimize adhesions and pain moving forward and with return to normal sexual activity when appropriate.  3. Pt will tolerate HEP to improve impairments and independence  with ADL's.     Long Term Goals: 12 weeks (6/26/17)  1. Pt will demonstrate increase in PFM strength and endurance to 3/5 for 10 x 10'' kegals in order to improve functional strength and stability to prevent future injury.  2. Normalize scar mobility in order to decrease pain and return to normal pain free sexual activity.  3. Pt will report decrease in fecal urgency.  4. Pt will be independent with HEP and self management.     CMS Impairment/Limitation/Restriction for Bowel Leakage Survey  Status Limitation G-Code CMS Severity Modifier  Intake 56% 44% Current Status CK - At least 40 percent but less than 60 percent  Predicted 68% 32% Goal Status+ CJ - At least 20 percent but less than 40 percent  Plan     Continue with established POC, working toward PT goals.

## 2017-06-09 ENCOUNTER — PATIENT MESSAGE (OUTPATIENT)
Dept: OBSTETRICS AND GYNECOLOGY | Facility: CLINIC | Age: 27
End: 2017-06-09

## 2017-06-09 ENCOUNTER — TELEPHONE (OUTPATIENT)
Dept: OBSTETRICS AND GYNECOLOGY | Facility: CLINIC | Age: 27
End: 2017-06-09

## 2017-06-09 NOTE — LETTER
June 9, 2017      [facility] [place] [address]  [facility] [address]  Phone: [phone]  Fax: [phone]       Patient: Earlene Nassar   YOB: 1990    To Whom It May Concern:    Earlene may return to work on Friday, June 9, 2016 with no restrictions. If you have any questions or concerns, or if I can be of further assistance, please do not hesitate to contact me.      Sincerely,      Martin Sky MD

## 2017-06-09 NOTE — TELEPHONE ENCOUNTER
Dr. Sky-- pt needs a note to return to work that is dated to start today June 9, 2017. Pt would like it faxed to [phone]. Pt's # [phone].

## 2017-06-12 ENCOUNTER — CLINICAL SUPPORT (OUTPATIENT)
Dept: REHABILITATION | Facility: HOSPITAL | Age: 27
End: 2017-06-12
Attending: OBSTETRICS & GYNECOLOGY
Payer: COMMERCIAL

## 2017-06-12 DIAGNOSIS — N81.89 PELVIC FLOOR WEAKNESS: ICD-10-CM

## 2017-06-12 DIAGNOSIS — R15.2 FECAL URGENCY: Primary | ICD-10-CM

## 2017-06-12 PROCEDURE — 97140 MANUAL THERAPY 1/> REGIONS: CPT | Mod: PN

## 2017-06-12 NOTE — PROGRESS NOTES
Patient: Earlene ZEPEDA [facility] #: [id number]   Diagnosis:   Encounter Diagnoses   Name Primary?    Fecal urgency Yes    Pelvic floor weakness       Date of start of care: 5/3/17  Date of treatment: 06/12/2017   Time in: 8:04  Time out: 9:00  Total treatment time: 56 minutes  Visit #: 6  Visits auth: 60  Auth expiration: 12/31/17  POC expiration: 6/26/17  Outpatient Physical Therapy   Daily Note    Subjective     Pt states she has returned to work, Friday was her first day so she has been transitioning back and has not had time to perform self massage. She is doing fine otherwise.    Pain: Current: 0/10    Objective     Scour: + R  Fadir: -  Josue: -  SLR: -  Bilateral pronation, left ilium lower than R, right side body shortened in static standing, narrowed pelvis     TREATMENT    Pt received individual therapeutic exercise to develop strength, coordination, endurance, motor control and core stability for 0 minutes including:    Not performed:  - 10 x 10'' kegals in supine wth SEMG assist; normal resting baseline, good WR rise, good holding ability, complete derecruitment  - 10 x 10'' kegals in sitting with SEMG assist; normal resting baseline, fair WR rise, fair holding, complete derecruitment  - MET to correct pelvic obliquity  - Supported child's pose x 5 min  - Legs up the wall x 3 min    Pt tolerated treatment well with no visible adverse affects. No skin irritation, errythemia, or other adverse affects observed from electrode placement.    Pt received manual therapy for 56 minutes: perineal scar mobilzation and soft tissue mobilization applied to introitus from ~ 3-9:00 with gentle downward stretch; pt with good tolerance for manual care today with localized pinpoint tenderness just laterally left to 6:00; passive DKTC with gentle hip traction    Education: Discussed progression of plan of care with patient; educated pt in activity modification; instructed pt to continue with current HEP including  diaphragmatic breathing, kegals (30x/day and TA activation), towel crunches, clams, and ball squeeze, lateral step downs, bridges, piriformis stretch, half kneeling hip flexor stretch, supported child's pose and legs up the wall/in a chair with addition of gentle perineal massage as performed in the clinic today; pt demonstrated and verbalized understanding and was provided with a handout.     Assessment     Pt tolerated session well today without visible adverse effects.Pt with good tolerance for manual care today with localized tenderness at 6:00, denies tenderness to superficial incision across perineal body. Pt continues to exhibit decreased core strength and stability, will continue to progress therapeutic exercise with instruction in mountain pose to improve pelvic and spinal muscular balance using straps at next session. Pt will continue to benefit from continued skilled PT intervention in order to maximize pain free functional independence.      Pt is making good progress towards established goals.  No spiritual, cultural, or educational barriers to learning identified.    GOALS  Short Term Goals: 6 weeks (6/14/17)  1. Pt will demonstrate increase in PF muscle strength to 3/5 to improve core strength and stability in order to improve overall functional strength and prevent future injury.  2. Pt will be independent with scar mobilization techniques and perineal massage in order to minimize adhesions and pain moving forward and with return to normal sexual activity when appropriate.  3. Pt will tolerate HEP to improve impairments and independence with ADL's.     Long Term Goals: 12 weeks (6/26/17)  1. Pt will demonstrate increase in PFM strength and endurance to 3/5 for 10 x 10'' kegals in order to improve functional strength and stability to prevent future injury.  2. Normalize scar mobility in order to decrease pain and return to normal pain free sexual activity.  3. Pt will report decrease in fecal  urgency.  4. Pt will be independent with HEP and self management.     CMS Impairment/Limitation/Restriction for Bowel Leakage Survey  Status Limitation G-Code CMS Severity Modifier  Intake 56% 44% Current Status CK - At least 40 percent but less than 60 percent  Predicted 68% 32% Goal Status+ CJ - At least 20 percent but less than 40 percent  Plan     Continue with established POC, working toward PT goals.

## 2017-06-21 ENCOUNTER — TELEPHONE (OUTPATIENT)
Dept: UROGYNECOLOGY | Facility: CLINIC | Age: 27
End: 2017-06-21

## 2017-06-21 ENCOUNTER — OFFICE VISIT (OUTPATIENT)
Dept: UROGYNECOLOGY | Facility: CLINIC | Age: 27
End: 2017-06-21
Payer: COMMERCIAL

## 2017-06-21 VITALS
DIASTOLIC BLOOD PRESSURE: 80 MMHG | SYSTOLIC BLOOD PRESSURE: 110 MMHG | WEIGHT: 120.38 LBS | HEIGHT: 63 IN | BODY MASS INDEX: 21.33 KG/M2

## 2017-06-21 DIAGNOSIS — N92.1 MENORRHAGIA WITH IRREGULAR CYCLE: ICD-10-CM

## 2017-06-21 DIAGNOSIS — N81.89 PELVIC FLOOR WEAKNESS: ICD-10-CM

## 2017-06-21 DIAGNOSIS — R15.2 FECAL URGENCY: Primary | ICD-10-CM

## 2017-06-21 PROBLEM — L76.82 INCISIONAL PAIN: Status: RESOLVED | Noted: 2017-06-04 | Resolved: 2017-06-21

## 2017-06-21 PROBLEM — T14.8XXD DELAYED WOUND HEALING: Status: RESOLVED | Noted: 2017-05-19 | Resolved: 2017-06-21

## 2017-06-21 PROCEDURE — 99213 OFFICE O/P EST LOW 20 MIN: CPT | Mod: S$GLB,,, | Performed by: OBSTETRICS & GYNECOLOGY

## 2017-06-21 PROCEDURE — 99999 PR PBB SHADOW E&M-EST. PATIENT-LVL III: CPT | Mod: PBBFAC,,, | Performed by: OBSTETRICS & GYNECOLOGY

## 2017-06-21 NOTE — PATIENT INSTRUCTIONS
1. 4th degree laceration w/fecal incontinence   - laceration healed well; still some pain at incision site  - continue to avoid straining with bowel movements  - finish PT with Lauren Shepley -- start deep massage of tender area at vaginal opening 6 o'clock; use lidocaine gel (dime-sized amount) with finger on area just before PT, during PT, and after PT if needed.  Do not use more that 3x/day.    - continue estrogen cream or triad on perineum and just inside vagina as needed    2.   Irregular menstrual bleeding with lightheadedness:  - regulating better now  - CBC normal  - take OCP with food in stomach  - follow up with Dr. Sky    3.   RTC as needed.

## 2017-06-21 NOTE — PROGRESS NOTES
Subjective:       Patient ID: Earlene Nassar is a 27 y.o. female.    Chief Complaint:  Vaginal Bleeding (pt states it has been better)      History of Present Illness: 27yo P1 who is 4 weeks postpartum from  complicated by episiotomy & 4th degree laceration. After started taking miralax once per day for prevention, while taking that she had urgency with fecal incontinence with soft stool every other day. She stopped miralax 5 days ago, states her BM are now more formed and hard with incomplete emptying. Since stopping miralax denies fecal incontinence or smearing, but still complains of urgency. Denies splinting. She is not currently taking any stool softeners or fiber. Denies gas incontinence. Pain is well controlled with scheduled ibuprofen q6h. Denies vaginal discharge or drainage from tear. Denies stress or urge urinary incontinence. She was referred by Dr. Sky for evaluation.     Interval history: Seen by Dr. Sky last week started on bactrim and vaginal estrogen with resolution of vaginal pain no longer using Toradol.  She has persistent fecal urgency and rare incontinence. Currently taking the stool softener and fiber, not straining with BM's. Denies fever or chills.       TODAY:  --Stopped triad and premarin cream in 1 week  --posterior pinpoint still faint TTP; had intercourse wasn't too bad  --started PT on [address] with Shepley--will see next week once restart internal  --No colace or fiber; controlled with diet  --no breastfeeding  --has had some disinterest in food some since starting OCPs  -- Started OCP's 2017. Was having menses x 8 days every 2 weeks.  Started VB day after OCP initiation.  X 8 days, then 2 weeks later x 8d, then did placebo pills w/o bleeding.  Has had some dizziness and fatigue.  No SOB/CP.  NOW:  Regulating better.  CBC normal last visit.     Vaginal Bleeding   The patient's primary symptoms include vaginal bleeding. The patient's pertinent negatives include no pelvic  pain or vaginal discharge (lochia). Pertinent negatives include no abdominal pain, back pain, chills, constipation, diarrhea, dysuria, fever, flank pain, frequency, hematuria, nausea, rash, urgency or vomiting. The vaginal bleeding is heavier than menses. She has been passing clots.   Started OCP's 2017. Has been having menses x 8 days every 2 weeks. Started VB day after OCP initiation.  X 8 days, then 2 weeks later x 8d.      GYN & OB History  Patient's last menstrual period was 2017.   Date of Last Pap: 5/10/2017    OB History    Para Term  AB Living   1 1 1   1   SAB TAB Ectopic Multiple Live Births      0 1      # Outcome Date GA Lbr Andrew/2nd Weight Sex Delivery Anes PTL Lv   1 Term 17 39w0d  3.402 kg (7 lb 8 oz) M Vag-Spont EPI N JJ      Obstetric Comments   Menarche ~14     Past Surgical History:   Procedure Laterality Date    ADENOIDECTOMY      TONSILLECTOMY         Current Outpatient Prescriptions:     estradiol (ESTRACE) 0.01 % (0.1 mg/gram) vaginal cream, Apply pea size amount nightly to external area., Disp: 1 Tube, Rfl: 1    lidocaine HCL 2% (XYLOCAINE) 2 % jelly, Dime-sized amount with finger to affected area up to 3x/day as needed for pain, Disp: 30 mL, Rfl: 3    norethindrone-e.estradiol-iron (TAYTULLA) 1 mg-20 mcg (24)/75 mg (4) Cap, Take 1 tablet by mouth once daily., Disp: 84 capsule, Rfl: 3    wound dressings Pste, Apply 1 application topically every morning., Disp: 71 g, Rfl: 3    docusate sodium (COLACE) 100 MG capsule, Take 1 capsule (100 mg total) by mouth 2 (two) times daily., Disp: 60 capsule, Rfl: 2    ketorolac (TORADOL) 10 mg tablet, Take 1 tablet (10 mg total) by mouth every 6 (six) hours., Disp: 20 tablet, Rfl: 0    Review of patient's allergies indicates:   Allergen Reactions    Codeine Other (See Comments)     Per pt's mother she had rash as a child but took narcotics after Tonsillectomy without problems         Review of Systems  Review of  Systems   Constitutional: Negative.  Negative for activity change, appetite change, chills, diaphoresis, fatigue, fever and unexpected weight change.   HENT: Negative.    Eyes: Negative.    Respiratory: Negative.  Negative for apnea, cough and wheezing.    Cardiovascular: Negative.  Negative for chest pain and palpitations.   Gastrointestinal: Positive for rectal pain. Negative for abdominal distention, abdominal pain, anal bleeding, blood in stool, constipation, diarrhea, nausea and vomiting.        Fecal urgency/incontinence with soft stool   Endocrine: Negative.    Genitourinary: Positive for vaginal bleeding. Negative for decreased urine volume, difficulty urinating, dyspareunia, dysuria, enuresis, flank pain, frequency, genital sores, hematuria, menstrual problem, pelvic pain, urgency and vaginal discharge (lochia).   Musculoskeletal: Negative for back pain and gait problem.   Skin: Negative for color change, pallor, rash and wound.   Allergic/Immunologic: Negative for immunocompromised state.   Neurological: Negative.  Negative for dizziness and speech difficulty.   Hematological: Negative for adenopathy.   Psychiatric/Behavioral: Negative for agitation, behavioral problems, confusion and sleep disturbance.           Objective:     Physical Exam   Constitutional: She is oriented to person, place, and time. She appears well-developed and well-nourished. No distress.   Cardiovascular: Normal rate and regular rhythm.    Pulmonary/Chest: Effort normal.   Abdominal: Soft. She exhibits no distension and no mass. There is no tenderness. There is no rebound and no guarding.   Genitourinary:   Genitourinary Comments: Posterior vaginal wall with sutures in place, healing well, no erythema or exudate.Normal lochia present.   Musculoskeletal: Normal range of motion.   Neurological: She is alert and oriented to person, place, and time.   Skin: Skin is warm and dry.   Psychiatric: She has a normal mood and affect.         Pelvic (last visit):  Introitus:  Was having 0.5 very superficial area at mid posterior fourchette--completely healed now--still sensitive to superificial touch and deep palpation; no other TTP; internal vaginal well-healed, NT  Perineum: well healed  Rectal:  EAS intact--feels well healed, NT, no rectocele    Assessment:        1. Fecal urgency    2. Menorrhagia with irregular cycle    3. Pelvic floor weakness             Plan:      1. 4th degree laceration w/fecal incontinence   - laceration healed well; still some pain at incision site  - continue to avoid straining with bowel movements  - finish PT with Lauren Shepley -- start deep massage of tender area at vaginal opening 6 o'clock; use lidocaine gel (dime-sized amount) with finger on area just before PT, during PT, and after PT if needed.  Do not use more that 3x/day.    - continue estrogen cream or triad on perineum and just inside vagina as needed    2.   Irregular menstrual bleeding with lightheadedness:  - regulating better now  - CBC normal  - take OCP with food in stomach  - follow up with Dr. Sky    3.   RTC as needed.     Approx 20 min spent in consult, 100% in discussion.   Female Pelvic Medicine and Reconstructive Surgery  [facility] [place], [place]

## 2017-06-21 NOTE — TELEPHONE ENCOUNTER
----- Message from Josi Garduno sent at 6/21/2017  8:21 AM CDT -----  x_  1st Request  _  2nd Request  _  3rd Request        Who: sanjay    Why: pt. Wants to know does  have any earlier appts. For today please call to discuss    What Number to Call Back:[phone]    When to Expect a call back: (Before the end of the day)   -- if the call is after 12:00, the call back will be tomorrow.

## 2017-06-21 NOTE — TELEPHONE ENCOUNTER
Spoke with pt and advised her she can come in for an earlier appt.@ 11/11:30. She verbalized understanding call ended.

## 2017-06-22 ENCOUNTER — CLINICAL SUPPORT (OUTPATIENT)
Dept: REHABILITATION | Facility: HOSPITAL | Age: 27
End: 2017-06-22
Attending: OBSTETRICS & GYNECOLOGY
Payer: COMMERCIAL

## 2017-06-22 DIAGNOSIS — N81.89 PELVIC FLOOR WEAKNESS: ICD-10-CM

## 2017-06-22 DIAGNOSIS — R15.2 FECAL URGENCY: Primary | ICD-10-CM

## 2017-06-22 PROCEDURE — 97110 THERAPEUTIC EXERCISES: CPT | Mod: PN

## 2017-06-22 PROCEDURE — 97140 MANUAL THERAPY 1/> REGIONS: CPT | Mod: PN

## 2017-06-22 NOTE — PROGRESS NOTES
Patient: Earlene ZEPEDA [facility] #: [id number]   Diagnosis:   Encounter Diagnoses   Name Primary?    Fecal urgency Yes    Pelvic floor weakness       Date of start of care: 5/3/17  Date of treatment: 06/22/2017   Time in: 8:04  Time out: 9:15  Total treatment time: 71 minutes  Visit #: 7  Visits auth: 60  Auth expiration: 12/31/17  POC expiration: 6/26/17  Outpatient Physical Therapy   Daily Note    Subjective     Pt reports: nothing new to report. She has performed perineal massage twice at home with no difficulty and no exacerbation of pain. Pt also reports she has had intercourse; she had pain with iniitial penetration at 6 o'clock but otherwise denies pain.    Pain: Current: 0/10    Objective     TREATMENT    Pt received individual therapeutic exercise to develop strength, coordination, endurance, motor control and core stability for 40 minutes including:    - PFM down training (PT guided relaxation with DB); pt displays avg. resting baseline of 2.1 post down training  - 10 x 10'' kegals with SEMG assist, external electrodes; normal resting baseline, good WR rise (18.2); slow derecruitment with slightly elevated baseline during kegals  - 2 x 10 kegals with diaphragmatic breathing; SEMG assist set 1, manual assessment set 2; pt displays good coordination with good recruitment and derecruitment; pt displays weak 3/5 PFM strength  - Instruction in and performance of mountain pose  - Mountain pose with yoga strap assist and half forward fold/HS stretch using plinth  - Standing forward fold/HS stretch using plinth with verbal instructions throughout    Pt tolerated treatment well with no visible adverse affects. No skin irritation, errythemia, or other adverse affects observed from electrode placement.    Pt received manual therapy for 30 minutes: perineal scar mobilzation and soft tissue mobilization applied to introitus from ~ 3-9:00 with gentle downward stretch; pt with good tolerance for manual care today  with localized pinpoint tenderness just laterally left to 6:00    Education: Discussed progression of plan of care with patient; educated pt in activity modification; instructed pt to continue with current HEP including diaphragmatic breathing, kegals (30x/day and TA activation), towel crunches, clams, and ball squeeze, lateral step downs, bridges, piriformis stretch, half kneeling hip flexor stretch, supported child's pose and legs up the wall/in a chair, gentle perineal massage as performed in the clinic today; pt demonstrated and verbalized understanding.    Assessment     Pt tolerated session well today without visible adverse effects. Pt continues with good tolerance for manual care with localized pain superficially at 6 o'clock with good tolerance for increased stretch and mobilization today. Pt was encouraged to continue performing manual care at home to maintain good mobility and progress improvement in tenderness. Pt continues to exhibit decreased core strength and stability with c/o R low back pain, she was able to make adjustments today per PT verbal and manual cueing during standing therapeutic exercise to decrease her pain; will continue to progress therapeutic exercise. Pt will continue to benefit from continued skilled PT intervention in order to maximize pain free functional independence.      FOTO next visit.    Pt is making good progress towards established goals.  No spiritual, cultural, or educational barriers to learning identified.    GOALS  Short Term Goals: 6 weeks (6/14/17)  1. Pt will demonstrate increase in PF muscle strength to 3/5 to improve core strength and stability in order to improve overall functional strength and prevent future injury. Progressing, pt exhibits slight lift  2. Pt will be independent with scar mobilization techniques and perineal massage in order to minimize adhesions and pain moving forward and with return to normal sexual activity when appropriate. Met 6/22/17  3.  Pt will tolerate HEP to improve impairments and independence with ADL's. Met 6/22/17     Long Term Goals: 12 weeks (6/26/17)  1. Pt will demonstrate increase in PFM strength and endurance to 3/5 for 10 x 10'' kegals in order to improve functional strength and stability to prevent future injury.  2. Normalize scar mobility in order to decrease pain and return to normal pain free sexual activity.  3. Pt will report decrease in fecal urgency. Met 6/22/17, continues to improve per pt report  4. Pt will be independent with HEP and self management.     CMS Impairment/Limitation/Restriction for Bowel Leakage Survey  Status Limitation G-Code CMS Severity Modifier  Intake 56% 44% Current Status CK - At least 40 percent but less than 60 percent  Predicted 68% 32% Goal Status+ CJ - At least 20 percent but less than 40 percent  Plan     Continue with established POC, working toward PT goals.

## 2017-06-23 ENCOUNTER — OFFICE VISIT (OUTPATIENT)
Dept: OBSTETRICS AND GYNECOLOGY | Facility: CLINIC | Age: 27
End: 2017-06-23
Attending: OBSTETRICS & GYNECOLOGY
Payer: COMMERCIAL

## 2017-06-23 ENCOUNTER — LAB VISIT (OUTPATIENT)
Dept: LAB | Facility: OTHER | Age: 27
End: 2017-06-23
Attending: OBSTETRICS & GYNECOLOGY
Payer: COMMERCIAL

## 2017-06-23 VITALS
HEIGHT: 63 IN | BODY MASS INDEX: 21.53 KG/M2 | DIASTOLIC BLOOD PRESSURE: 78 MMHG | WEIGHT: 121.5 LBS | SYSTOLIC BLOOD PRESSURE: 122 MMHG

## 2017-06-23 DIAGNOSIS — Z00.00 BLOOD TESTS FOR ROUTINE GENERAL PHYSICAL EXAMINATION: ICD-10-CM

## 2017-06-23 DIAGNOSIS — N92.6 ABNORMAL BLEEDING IN MENSTRUAL CYCLE: Primary | ICD-10-CM

## 2017-06-23 LAB
ALBUMIN SERPL BCP-MCNC: 3.9 G/DL
ALP SERPL-CCNC: 60 U/L
ALT SERPL W/O P-5'-P-CCNC: 20 U/L
ANION GAP SERPL CALC-SCNC: 9 MMOL/L
AST SERPL-CCNC: 18 U/L
BASOPHILS # BLD AUTO: 0.03 K/UL
BASOPHILS NFR BLD: 0.4 %
BILIRUB SERPL-MCNC: 0.3 MG/DL
BUN SERPL-MCNC: 12 MG/DL
CALCIUM SERPL-MCNC: 9.2 MG/DL
CHLORIDE SERPL-SCNC: 105 MMOL/L
CHOLEST/HDLC SERPL: 3.9 {RATIO}
CO2 SERPL-SCNC: 26 MMOL/L
CREAT SERPL-MCNC: 0.7 MG/DL
DIFFERENTIAL METHOD: NORMAL
EOSINOPHIL # BLD AUTO: 0.1 K/UL
EOSINOPHIL NFR BLD: 0.7 %
ERYTHROCYTE [DISTWIDTH] IN BLOOD BY AUTOMATED COUNT: 12.7 %
EST. GFR  (AFRICAN AMERICAN): >60 ML/MIN/1.73 M^2
EST. GFR  (NON AFRICAN AMERICAN): >60 ML/MIN/1.73 M^2
GLUCOSE SERPL-MCNC: 93 MG/DL
HCT VFR BLD AUTO: 40.1 %
HDL/CHOLESTEROL RATIO: 25.8 %
HDLC SERPL-MCNC: 217 MG/DL
HDLC SERPL-MCNC: 56 MG/DL
HGB BLD-MCNC: 13.3 G/DL
LDLC SERPL CALC-MCNC: 142.6 MG/DL
LYMPHOCYTES # BLD AUTO: 3 K/UL
LYMPHOCYTES NFR BLD: 44.6 %
MCH RBC QN AUTO: 29 PG
MCHC RBC AUTO-ENTMCNC: 33.2 %
MCV RBC AUTO: 87 FL
MONOCYTES # BLD AUTO: 0.7 K/UL
MONOCYTES NFR BLD: 10.8 %
NEUTROPHILS # BLD AUTO: 2.9 K/UL
NEUTROPHILS NFR BLD: 43.4 %
NONHDLC SERPL-MCNC: 161 MG/DL
PLATELET # BLD AUTO: 311 K/UL
PMV BLD AUTO: 10.3 FL
POTASSIUM SERPL-SCNC: 3.8 MMOL/L
PROT SERPL-MCNC: 7.7 G/DL
RBC # BLD AUTO: 4.59 M/UL
SODIUM SERPL-SCNC: 140 MMOL/L
TRIGL SERPL-MCNC: 92 MG/DL
TSH SERPL DL<=0.005 MIU/L-ACNC: 1.01 UIU/ML
WBC # BLD AUTO: 6.79 K/UL

## 2017-06-23 PROCEDURE — 36415 COLL VENOUS BLD VENIPUNCTURE: CPT

## 2017-06-23 PROCEDURE — 80053 COMPREHEN METABOLIC PANEL: CPT

## 2017-06-23 PROCEDURE — 99213 OFFICE O/P EST LOW 20 MIN: CPT | Mod: S$GLB,,, | Performed by: OBSTETRICS & GYNECOLOGY

## 2017-06-23 PROCEDURE — 80061 LIPID PANEL: CPT

## 2017-06-23 PROCEDURE — 99999 PR PBB SHADOW E&M-EST. PATIENT-LVL III: CPT | Mod: PBBFAC,,, | Performed by: OBSTETRICS & GYNECOLOGY

## 2017-06-23 PROCEDURE — 85025 COMPLETE CBC W/AUTO DIFF WBC: CPT

## 2017-06-23 PROCEDURE — 84443 ASSAY THYROID STIM HORMONE: CPT

## 2017-06-23 NOTE — PROGRESS NOTES
"CC: f/u AUB    Earlene Nassar is a 27 y.o. female  last bleed on  which was during hormone week now of placebo week and bleeding again.  Very complaint with ocps.  She feels it is getting better as she has been on them now three months. Having sex without issues. Back at work.  Baby in .  Doing much better. No fecal or flatus incontinence.      History reviewed. No pertinent past medical history.    Past Surgical History:   Procedure Laterality Date    ADENOIDECTOMY      TONSILLECTOMY         OB History    Para Term  AB Living   1 1 1     1   SAB TAB Ectopic Multiple Live Births         0 1      # Outcome Date GA Lbr Andrew/2nd Weight Sex Delivery Anes PTL Lv   1 Term 17 39w0d  3.402 kg (7 lb 8 oz) M Vag-Spont EPI N JJ      Obstetric Comments   Menarche ~14       Family History   Problem Relation Age of Onset    No Known Problems Mother     Heart disease Father     No Known Problems Maternal Grandmother     No Known Problems Maternal Grandfather     No Known Problems Paternal Grandmother     No Known Problems Paternal Grandfather     Breast cancer Neg Hx     Colon cancer Neg Hx     Ovarian cancer Neg Hx     Cervical cancer Neg Hx     Endometrial cancer Neg Hx     Vaginal cancer Neg Hx        Social History   Substance Use Topics    Smoking status: Never Smoker    Smokeless tobacco: Never Used    Alcohol use Yes      Comment: occasionally       /78   Ht 5' 3" (1.6 m)   Wt 55.1 kg (121 lb 7.6 oz)   LMP 2017   BMI 21.52 kg/m²     ROS:  GENERAL: Denies weight gain or weight loss. Feeling well overall.   SKIN: Denies rash or lesions.   HEAD: Denies head injury or headache.   NODES: Denies enlarged lymph nodes.   CHEST: Denies chest pain or shortness of breath.   CARDIOVASCULAR: Denies palpitations or left sided chest pain.   ABDOMEN: No abdominal pain, constipation, diarrhea, nausea, vomiting or rectal bleeding.   URINARY: No frequency, dysuria, " hematuria, or burning on urination.  REPRODUCTIVE: See HPI.   BREASTS: denies pain, lumps, or nipple discharge.   HEMATOLOGIC: No easy bruisability or excessive bleeding.  MUSCULOSKELETAL: Denies joint pain or swelling.   NEUROLOGIC: Denies syncope or weakness.   PSYCHIATRIC: Denies depression, anxiety or mood swings.    Physical Exam:    APPEARANCE: Well nourished, well developed, in no acute distress.  AFFECT: WNL, alert and oriented x 3  SKIN: No acne or hirsutism  NECK: Neck symmetric without masses or thyromegaly  NODES: No inguinal, cervical, axillary, or femoral lymph node enlargement  CHEST: Good respiratory effect  ABDOMEN: Soft.  No tenderness or masses.  No hepatosplenomegaly.  No hernias.  PELVIC: Normal external genitalia without lesions.  Normal hair distribution.  Adequate perineal body, normal urethral meatus.  Vagina moist and well rugated without lesions or discharge.  Cervix pink, without lesions, discharge or tenderness.  No significant cystocele or rectocele.  Bimanual exam shows uterus to be normal size, regular, mobile and nontender.  Adnexa without masses or tenderness.    EXTREMITIES: No edema.    ASSESSMENT AND PLAN    Diagnoses and all orders for this visit:    Abnormal bleeding in menstrual cycle    Blood tests for routine general physical examination  -     CBC auto differential; Future  -     Comprehensive metabolic panel; Future  -     LIPID PANEL; Future  -     TSH; Future    pt requesting health maintenance labs.  Offered LTCS for next delivery low risk of recurrence but worried about recurrence understandable.  Pt requesting LTCS next baby.  Normal exam today.  Give ocps more time feel now that she is on third month it will regulate.     Return if symptoms worsen or fail to improve.

## 2017-06-29 ENCOUNTER — CLINICAL SUPPORT (OUTPATIENT)
Dept: REHABILITATION | Facility: HOSPITAL | Age: 27
End: 2017-06-29
Attending: OBSTETRICS & GYNECOLOGY
Payer: COMMERCIAL

## 2017-06-29 DIAGNOSIS — R15.2 FECAL URGENCY: Primary | ICD-10-CM

## 2017-06-29 DIAGNOSIS — N81.89 PELVIC FLOOR WEAKNESS: ICD-10-CM

## 2017-06-29 PROCEDURE — 97140 MANUAL THERAPY 1/> REGIONS: CPT | Mod: PN

## 2017-06-29 PROCEDURE — 97110 THERAPEUTIC EXERCISES: CPT | Mod: PN

## 2017-06-29 NOTE — PROGRESS NOTES
Patient: Earlene ZEPEDA [facility] #: [id number]   Diagnosis:   Encounter Diagnoses   Name Primary?    Fecal urgency Yes    Pelvic floor weakness       Date of start of care: 5/3/17  Date of treatment: 06/29/2017   Time in: 8:04  Time out: 8:58  Total treatment time: 54 minutes  Visit #: 8  Visits auth: [id number]  Auth expiration: 12/31/17  POC expiration: 6/26/17  Outpatient Physical Therapy   Daily Note    Subjective     Pt reports: she continues to do well. She has been performing self massage/scar mobilization and her kegals at home. It is difficult to find time to perform all of her core exercises but she is compliant with her PF HEP.    Pain: Current: 0/10    Objective     TREATMENT    Pt received individual therapeutic exercise to develop strength, coordination, endurance, motor control and core stability for 10 minutes including:    - Kegal activation with diaphragmatic breathing x 10; pt continues to display good coordination with good recruitment and derecruitment; pt displays weak 3/5 PFM strength  - TA activation with diaphragmatic breathing x 10    Not performed:  - PFM down training (PT guided relaxation with DB); pt displays avg. resting baseline of 2.1 post down training  - 10 x 10'' kegals with SEMG assist, external electrodes; normal resting baseline, good WR rise (18.2); slow derecruitment with slightly elevated baseline during kegals  - Instruction in and performance of mountain pose  - Mountain pose with yoga strap assist and half forward fold/HS stretch using plinth  - Standing forward fold/HS stretch using plinth with verbal instructions throughout    Pt tolerated treatment well with no visible adverse affects. No skin irritation, errythemia, or other adverse affects observed from electrode placement.    Pt received manual therapy for 44 minutes: perineal scar mobilzation and soft tissue mobilization applied to introitus from ~ 3-9:00 with stretching    Education: Discussed progression of plan of  care with patient; educated pt in activity modification; instructed pt to continue with current HEP including diaphragmatic breathing, kegals (30x/day), TA activation, towel crunches, clams, and ball squeeze, lateral step downs, bridges, piriformis stretch, half kneeling hip flexor stretch, supported child's pose and legs up the wall/in a chair, gentle perineal massage as performed in the clinic today; pt was instructed to perform PFM activation with DB and TA activation with DB; pt demonstrated and verbalized understanding.    Assessment     Pt tolerated session well today without visible adverse effects. Pt continues to display slow and steady progress of scar and soft tissue mobility at her incision site as well as internally. Pt is compliant with her HEP and expected to continue to make good progress. Pt continues to exhibit decreased core strength and stability; will continue to progress therapeutic exercise.  Pt will continue to benefit from continued skilled PT intervention in order to maximize pain free functional independence. Will reassess and update POC at pt's next visit.    Pt is making good progress towards established goals.  No spiritual, cultural, or educational barriers to learning identified.    GOALS  Short Term Goals: 6 weeks (6/14/17)  1. Pt will demonstrate increase in PF muscle strength to 3/5 to improve core strength and stability in order to improve overall functional strength and prevent future injury. Progressing, pt exhibits slight lift  2. Pt will be independent with scar mobilization techniques and perineal massage in order to minimize adhesions and pain moving forward and with return to normal sexual activity when appropriate. Met 6/22/17  3. Pt will tolerate HEP to improve impairments and independence with ADL's. Met 6/22/17     Long Term Goals: 12 weeks (7/26/17)  1. Pt will demonstrate increase in PFM strength and endurance to 3/5 for 10 x 10'' kegals in order to improve functional  strength and stability to prevent future injury.  2. Normalize scar mobility in order to decrease pain and return to normal pain free sexual activity.  3. Pt will report decrease in fecal urgency. Met 6/22/17, continues to improve per pt report  4. Pt will be independent with HEP and self management.     CMS Impairment/Limitation/Restriction for Bowel Leakage Survey  Status Limitation G-Code CMS Severity Modifier  Intake 56% 44%  Predicted 68% 32% Goal Status+ CJ - At least 20 percent but less than 40 percent  6/29/2017 64% 36% Current Status CJ - At least 20 percent but less than 40 percent  Plan     Continue with established POC, working toward PT goals.

## 2017-07-20 ENCOUNTER — CLINICAL SUPPORT (OUTPATIENT)
Dept: REHABILITATION | Facility: HOSPITAL | Age: 27
End: 2017-07-20
Attending: OBSTETRICS & GYNECOLOGY
Payer: COMMERCIAL

## 2017-07-20 DIAGNOSIS — N81.89 PELVIC FLOOR WEAKNESS: ICD-10-CM

## 2017-07-20 DIAGNOSIS — R15.2 FECAL URGENCY: Primary | ICD-10-CM

## 2017-07-20 PROCEDURE — 97110 THERAPEUTIC EXERCISES: CPT | Mod: PN

## 2017-07-20 PROCEDURE — 97140 MANUAL THERAPY 1/> REGIONS: CPT | Mod: PN

## 2017-07-20 NOTE — PROGRESS NOTES
Patient: Earlene ZEPEDA [facility] #: [id number]   Diagnosis:   Encounter Diagnoses   Name Primary?    Fecal urgency Yes    Pelvic floor weakness       Date of start of care: 5/3/17  Date of treatment: 07/20/2017   Time in: 8:04  Time out: 8:30  Total treatment time: 26 minutes  Visit #: 9  Visits auth: [id number]  Auth expiration: 12/31/17  POC expiration: 6/26/17  Outpatient Physical Therapy   Discharge Note    Subjective     Pt reports: she is doing well today. She has no current c/o vaginal or pelvic pain and has had pain free intercourse, though is not having intercourse regularly.    Pain: Current: 0/10    Objective     TREATMENT    Pt received individual therapeutic exercise to develop strength, coordination, endurance, motor control and core stability for 10 minutes including:    - 10 x 10'' kegals in supine; good recruitment, holding ability and derecruitment, 3/5 MMT    Pt tolerated treatment well with no visible adverse affects. No skin irritation, errythemia, or other adverse affects observed from electrode placement.    Pt received manual therapy for 16 minutes: perineal scar mobilzation and soft tissue mobilization applied to introitus from ~ 3-9:00 with stretching; litocaine gel not used today due to 0/10 pain; pt reports minimal tenderness with initial downward stretch, denies pain with manual care following this    Education: Discussed progress and HEP/self management moving forward after today's discharge. Pt was instructed to continue with current program including diaphragmatic breathing, kegals (30x/day), TA activation, towel crunches, clams, ball squeeze, lateral step downs, bridges, piriformis stretch, half kneeling hip flexor stretch, supported child's pose and legs up the wall/in a chair, perineal massage; pt was instructed to perform PFM activation with DB and TA activation with DB; pt demonstrated and verbalized understanding.    Assessment     Pt tolerated session well today without visible  adverse effects. Pt arrived today reporting resolution of incisional pain. Pt has demonstrated excellent compliance and progress in therapy and is independent with her HEP and self management at this time. Pt has met all established goals and is expected to continue to make progress independently. Pt verbalizes understanding of self management moving forward. Pt no longer requires skilled PT intervention at this time.    GOALS  Short Term Goals: 6 weeks (6/14/17)  1. Pt will demonstrate increase in PF muscle strength to 3/5 to improve core strength and stability in order to improve overall functional strength and prevent future injury. Met 7/20/17  2. Pt will be independent with scar mobilization techniques and perineal massage in order to minimize adhesions and pain moving forward and with return to normal sexual activity when appropriate. Met 6/22/17  3. Pt will tolerate HEP to improve impairments and independence with ADL's. Met 6/22/17     Long Term Goals: 12 weeks (7/26/17)  1. Pt will demonstrate increase in PFM strength and endurance to 3/5 for 10 x 10'' kegals in order to improve functional strength and stability to prevent future injury. Met 7/20/17  2. Normalize scar mobility in order to decrease pain and return to normal pain free sexual activity. Met 7/20/17  3. Pt will report decrease in fecal urgency. Met 7/20/17  4. Pt will be independent with HEP and self management. Met 7/20/17     CMS Impairment/Limitation/Restriction for Bowel Leakage Survey  Status Limitation G-Code CMS Severity Modifier  Intake 56% 44%  Predicted 68% 32% Goal Status+ CJ - At least 20 percent but less than 40 percent  6/29/2017 64% 36%  7/20/2017 83% 17% Current Status CI - At least 1 percent but less than 20 percent  Plan     Discharge

## 2017-08-01 ENCOUNTER — PATIENT MESSAGE (OUTPATIENT)
Dept: OBSTETRICS AND GYNECOLOGY | Facility: CLINIC | Age: 27
End: 2017-08-01

## 2017-08-15 ENCOUNTER — PATIENT MESSAGE (OUTPATIENT)
Dept: OBSTETRICS AND GYNECOLOGY | Facility: CLINIC | Age: 27
End: 2017-08-15

## 2017-08-23 ENCOUNTER — PATIENT MESSAGE (OUTPATIENT)
Dept: UROGYNECOLOGY | Facility: CLINIC | Age: 27
End: 2017-08-23

## 2017-08-23 ENCOUNTER — TELEPHONE (OUTPATIENT)
Dept: UROGYNECOLOGY | Facility: CLINIC | Age: 27
End: 2017-08-23

## 2017-08-23 NOTE — TELEPHONE ENCOUNTER
Spoke to pt and was unable to schedule her appointment due to insurance pt states she'll give us a call back after 9/1/17.

## 2017-08-23 NOTE — TELEPHONE ENCOUNTER
----- Message from Chikis Bloom sent at 8/23/2017  4:36 PM CDT -----  Contact: self  _x  1st Request  _  2nd Request  _  3rd Request    Who:GRACE MONTES [[id number]]    Why: pt needs to speak with a nurse in regards to scheduling an appt for reasons discussed.. Please advise....    What Number to Call Back: [phone]    When to Expect a call back: (Before the end of the day)   -- if call after 3:00 call back will be tomorrow.

## 2017-08-24 ENCOUNTER — OFFICE VISIT (OUTPATIENT)
Dept: UROGYNECOLOGY | Facility: CLINIC | Age: 27
End: 2017-08-24
Payer: COMMERCIAL

## 2017-08-24 ENCOUNTER — TELEPHONE (OUTPATIENT)
Dept: UROGYNECOLOGY | Facility: CLINIC | Age: 27
End: 2017-08-24

## 2017-08-24 VITALS
WEIGHT: 113.75 LBS | SYSTOLIC BLOOD PRESSURE: 110 MMHG | DIASTOLIC BLOOD PRESSURE: 66 MMHG | HEIGHT: 63 IN | BODY MASS INDEX: 20.16 KG/M2

## 2017-08-24 DIAGNOSIS — R15.2 FECAL URGENCY: ICD-10-CM

## 2017-08-24 DIAGNOSIS — N81.89 PELVIC FLOOR WEAKNESS: Primary | ICD-10-CM

## 2017-08-24 DIAGNOSIS — N92.1 MENORRHAGIA WITH IRREGULAR CYCLE: ICD-10-CM

## 2017-08-24 PROCEDURE — 99214 OFFICE O/P EST MOD 30 MIN: CPT | Mod: S$GLB,,, | Performed by: OBSTETRICS & GYNECOLOGY

## 2017-08-24 PROCEDURE — 99999 PR PBB SHADOW E&M-EST. PATIENT-LVL III: CPT | Mod: PBBFAC,,, | Performed by: OBSTETRICS & GYNECOLOGY

## 2017-08-24 PROCEDURE — 3008F BODY MASS INDEX DOCD: CPT | Mod: S$GLB,,, | Performed by: OBSTETRICS & GYNECOLOGY

## 2017-08-24 NOTE — PROGRESS NOTES
Subjective:       Patient ID: Earlene Nassar is a 27 y.o. female.    Chief Complaint:  fecal urgency      History of Present Illness: 25yo P1 who is 4 weeks postpartum from  complicated by episiotomy & 4th degree laceration. After started taking miralax once per day for prevention, while taking that she had urgency with fecal incontinence with soft stool every other day. She stopped miralax 5 days ago, states her BM are now more formed and hard with incomplete emptying. Since stopping miralax denies fecal incontinence or smearing, but still complains of urgency. Denies splinting. She is not currently taking any stool softeners or fiber. Denies gas incontinence. Pain is well controlled with scheduled ibuprofen q6h. Denies vaginal discharge or drainage from tear. Denies stress or urge urinary incontinence. She was referred by Dr. Sky for evaluation.     Interval history: Seen by Dr. Sky last week started on bactrim and vaginal estrogen with resolution of vaginal pain no longer using Toradol.  She has persistent fecal urgency and rare incontinence. Currently taking the stool softener and fiber, not straining with BM's. Denies fever or chills.       LAST VISIT:  --Stopped triad and premarin cream in 1 week  --posterior pinpoint still faint TTP; had intercourse wasn't too bad  --started PT on [address] with [facility]--will see next week once restart internal  --No colace or fiber; controlled with diet  --no breastfeeding  --has had some disinterest in food some since starting OCPs  -- Started OCP's 2017. Was having menses x 8 days every 2 weeks.  Started VB day after OCP initiation.  X 8 days, then 2 weeks later x 8d, then did placebo pills w/o bleeding.  Has had some dizziness and fatigue.  No SOB/CP.  NOW:  Regulating better.  CBC normal last visit.     Vaginal Bleeding   The patient's primary symptoms include vaginal bleeding. The patient's pertinent negatives include no pelvic pain or vaginal discharge  (lochia). Pertinent negatives include no abdominal pain, back pain, chills, constipation, diarrhea, dysuria, fever, flank pain, frequency, hematuria, nausea, rash, urgency or vomiting. The vaginal bleeding is heavier than menses. She has been passing clots.   Started OCP's 2017. Has been having menses x 8 days every 2 weeks. Started VB day after OCP initiation.  X 8 days, then 2 weeks later x 8d.      TODAY:  --has had some fecal urgency--worse than before pregnancy; was better s/p PT but now returning; recently has had some trouble holding stool since stopping PT x a few weeks--no oscar FI but sometimes feels it coming as she sits; stool is well-formed; has been doing PT exercises  --has had some PV dribbling vs. PV urgency with moderate 2nd volume; does have some random, sharp pains in bladder area  --has had some pain with intercourse, deep pain, sharp; has to stop due to pain; has not tried different positions; resolves quickly with cessation; no dryness; does not use lube  --vaginal flatulence sometimes  --feels like stress is more; feels overwhelming; not getting as much sleep; h/o anxiety in the past--was taking zoloft--stopped 4 months before pregnancy; didn't like tapering on/off but did control panic attacks    GYN & OB History  Patient's last menstrual period was 2017.   Date of Last Pap: 5/10/2017    OB History    Para Term  AB Living   1 1 1     1   SAB TAB Ectopic Multiple Live Births         0 1      # Outcome Date GA Lbr Andrew/2nd Weight Sex Delivery Anes PTL Lv   1 Term 17 39w0d  3.402 kg (7 lb 8 oz) M Vag-Spont EPI N JJ      Obstetric Comments   Menarche ~14     Past Surgical History:   Procedure Laterality Date    ADENOIDECTOMY      TONSILLECTOMY         Current Outpatient Prescriptions:     norethindrone-e.estradiol-iron (TAYTULLA) 1 mg-20 mcg (24)/75 mg (4) Cap, Take 1 tablet by mouth once daily., Disp: 84 capsule, Rfl: 3    estradiol (ESTRACE) 0.01 % (0.1  mg/gram) vaginal cream, Apply pea size amount nightly to external area., Disp: 1 Tube, Rfl: 1    ketorolac (TORADOL) 10 mg tablet, Take 1 tablet (10 mg total) by mouth every 6 (six) hours., Disp: 20 tablet, Rfl: 0    Review of patient's allergies indicates:   Allergen Reactions    Codeine Other (See Comments)     Per pt's mother she had rash as a child but took narcotics after Tonsillectomy without problems         Review of Systems  Review of Systems   Constitutional: Negative.  Negative for activity change, appetite change, chills, diaphoresis, fatigue, fever and unexpected weight change.   HENT: Negative.    Eyes: Negative.    Respiratory: Negative.  Negative for apnea, cough and wheezing.    Cardiovascular: Negative.  Negative for chest pain and palpitations.   Gastrointestinal: Positive for rectal pain. Negative for abdominal distention, abdominal pain, anal bleeding, blood in stool, constipation, diarrhea, nausea and vomiting.        Fecal urgency/incontinence with soft stool   Endocrine: Negative.    Genitourinary: Positive for vaginal bleeding. Negative for decreased urine volume, difficulty urinating, dyspareunia, dysuria, enuresis, flank pain, frequency, genital sores, hematuria, menstrual problem, pelvic pain, urgency and vaginal discharge (lochia).   Musculoskeletal: Negative for back pain and gait problem.   Skin: Negative for color change, pallor, rash and wound.   Allergic/Immunologic: Negative for immunocompromised state.   Neurological: Negative.  Negative for dizziness and speech difficulty.   Hematological: Negative for adenopathy.   Psychiatric/Behavioral: Negative for agitation, behavioral problems, confusion and sleep disturbance.           Objective:     Physical Exam   Constitutional: She is oriented to person, place, and time. She appears well-developed and well-nourished. No distress.   Cardiovascular: Normal rate and regular rhythm.    Pulmonary/Chest: Effort normal.   Abdominal: Soft.  She exhibits no distension and no mass. There is no tenderness. There is no rebound and no guarding.   Genitourinary:   Genitourinary Comments: Posterior vaginal wall with sutures in place, healing well, no erythema or exudate.Normal lochia present.   Musculoskeletal: Normal range of motion.   Neurological: She is alert and oriented to person, place, and time.   Skin: Skin is warm and dry.   Psychiatric: She has a normal mood and affect.        Pelvic (last visit):  Introitus:  Was having 0.5 very superficial area at mid posterior fourchette--completely healed now--still mildly sensitive to superificial touch and deep palpation; no other TTP; internal vaginal well-healed, NT  Cervix: no lesions, mild TTP with manipulation but not oscar CMT--feels like pain with intercourse  Uterus: NT, mobile, normal size  Perineum: well healed  Rectal:  EAS intact--feels well healed, NT, no rectocele    Assessment:        1. Pelvic floor weakness    2. Menorrhagia with irregular cycle    3. Fecal urgency             Plan:      1. 4th degree laceration w/fecal incontinence   - laceration healed well; still some mild pain at incision site  - most pain with cervical manipulation -- like with intercourse --try different positions to see if better  - continue to avoid straining with bowel movements; keep stool consistency normal to minimize any possible loss of control; resting and active sphincter tone is normal   - watch diet and avoid triggers of urgency  - restart PT exercises on a regular basis: at least 3-4 times/week  - continue estrogen cream or triad on perineum and just inside vagina as needed    2.   Irregular menstrual bleeding with lightheadedness:  - controlled; continue OCPs    3.  Urinary urgency with ? Bladder spasms:  --Empty bladder every 3 hours.  Empty well: wait a minute, lean forward on toilet.    --Avoid dietary irritants (see sheet).  Keep diary x 3-5 days to determine your irritants.  --KEGELS: do 10 in AM and  10 in PM, holding each x 10 seconds.  When you feel urge to go, STOP, KEGEL, and when urge has passed, then go to bathroom.  Continue PT exercises.    --URGE: consider medication in future.  Takes 2-4 weeks to see if will have effect.  For dry mouth: get sour, sugar free lozenge or gum.      4.   H/o anxiety:  --will talk with Dr. Sky about possible referrals  --try to work relaxation techniques when stresses as can affect bodily functions    5.  RTC as needed.     Approx 40 min spent in consult, 100% in discussion.   Female Pelvic Medicine and Reconstructive Surgery  [facility] [place], [place]

## 2017-08-24 NOTE — TELEPHONE ENCOUNTER
Spoke to pt and scheduled her for Dr. Alatorre's next available. Pt voiced understanding and call ended.

## 2017-08-24 NOTE — PATIENT INSTRUCTIONS
Bladder Irritants  Certain foods and drinks have been associated with worsening symptoms of urinary frequency, urgency, urge incontinence, or bladder pain. If you suffer from any of these conditions, you may wish to try eliminating one or more of these foods from your diet and see if your symptoms improve. If bladder symptoms are related to dietary factors, strict adherence to a diet thateliminates the food should bring marked relief in 10 days. Once you are feeling better, you can begin to add foods back into your diet, one at a time. If symptoms return, you will be able to identify the irritant. As you add foods back to your diet it is very important that you drink significant amounts of water.    -----------------------------------------------------------------------------------------------  List of Common Bladder Irritants*  Alcoholic beverages  Apples and apple juice  Cantaloupe  Carbonated beverages  Chili and spicy foods  Chocolate  Citrus fruit  Coffee (including decaffeinated)  Cranberries and cranberry juice  Grapes  Guava  Milk Products: milk, cheese, cottage cheese, yogurt, ice cream  Peaches  Pineapple  Plums  Strawberries  Sugar especially artificial sweeteners, saccharin, aspartame, corn sweeteners, honey, fructose, sucrose, lactose  Tea  Tomatoes and tomato juice  Vitamin B complex  Vinegar  *Most people are not sensitive to ALL of these products; your goal is to find the foods that make YOUR symptoms worse.  ---------------------------------------------------------------------------------------------------    Low-acid fruit substitutions include apricots, papaya, pears and watermelon. Coffee drinkers can drink Kava or other lowacid instant drinks. Tea drinkers can substitute non-citrus herbal and sun brewed teas. Calcium carbonate co-buffered with calcium ascorbate can be substituted for Vitamin C. Prelief is a dietary supplement that works as an acid blocker for the bladder.    Where to get more  information:        Overcoming Bladder Disorders by Cindy Edge and Mariam Bucio, 1990        You Dont Have to Live with Cystitis! By Ritu Cedillo, 1988  · http://[email or web address].org/oab    -------------------------------------------------------------    1. 4th degree laceration w/fecal incontinence   - laceration healed well; still some mild pain at incision site  - most pain with cervical manipulation -- like with intercourse --try different positions to see if better  - continue to avoid straining with bowel movements; keep stool consistency normal to minimize any possible loss of control; resting and active sphincter tone is normal   - watch diet and avoid triggers of urgency  - restart PT exercises on a regular basis: at least 3-4 times/week  - continue estrogen cream or triad on perineum and just inside vagina as needed    2.   Irregular menstrual bleeding with lightheadedness:  - controlled; continue OCPs    3.  Urinary urgency with ? Bladder spasms:  --Empty bladder every 3 hours.  Empty well: wait a minute, lean forward on toilet.    --Avoid dietary irritants (see sheet).  Keep diary x 3-5 days to determine your irritants.  --KEGELS: do 10 in AM and 10 in PM, holding each x 10 seconds.  When you feel urge to go, STOP, KEGEL, and when urge has passed, then go to bathroom.  Continue PT exercises.    --URGE: consider medication in future.  Takes 2-4 weeks to see if will have effect.  For dry mouth: get sour, sugar free lozenge or gum.      4.   H/o anxiety:  --will talk with Dr. Sky about possible referrals  --try to work relaxation techniques when stresses as can affect bodily functions    5.  RTC as needed.

## 2017-08-24 NOTE — TELEPHONE ENCOUNTER
----- Message from Sarabjit Vo sent at 8/24/2017  9:08 AM CDT -----  Contact: Pt  X_ 1st Request  _ 2nd Request  _ 3rd Request    Who: GRACE MONTES [[id number]]    Why: Patient would like to speak with staff in regards to being seen for a follow up appointment before 9/1. Patient will call [facility] to see if her new insurance will be available.     What Number to Call Back: [phone]    When to Expect a call back: (Before the end of the day)  -- if call after 3:00 call back will be tomorrow.

## 2017-08-26 ENCOUNTER — TELEPHONE (OUTPATIENT)
Dept: UROGYNECOLOGY | Facility: CLINIC | Age: 27
End: 2017-08-26

## 2017-08-26 DIAGNOSIS — Z86.59 HISTORY OF PANIC ATTACKS: ICD-10-CM

## 2017-08-26 DIAGNOSIS — F41.9 ANXIETY: Primary | ICD-10-CM

## 2017-08-26 PROBLEM — Z34.90 PREGNANCY: Status: RESOLVED | Noted: 2017-03-17 | Resolved: 2017-08-26

## 2017-08-26 NOTE — TELEPHONE ENCOUNTER
Please let patient know that I placed a referral with [facility].  It may take a few months to get appt, but I think she should make one, just to touch base about h/o anxiety/panic attacks. Order placed in EPIC.  Can you please help her make appt?  In meantime, she should practice relaxation techniques when she is feeling stressed.  Thanks!

## 2017-08-28 ENCOUNTER — TELEPHONE (OUTPATIENT)
Dept: UROGYNECOLOGY | Facility: CLINIC | Age: 27
End: 2017-08-28

## 2017-08-28 NOTE — TELEPHONE ENCOUNTER
Called Pt to inform her that a referral had been placed in the system for her to make an appointment with  psychiatry regarding her panic attack/anxiety. Pt voiced understanding and call was ended.

## 2018-05-07 ENCOUNTER — OFFICE VISIT (OUTPATIENT)
Dept: FAMILY MEDICINE | Facility: CLINIC | Age: 28
End: 2018-05-07
Payer: COMMERCIAL

## 2018-05-07 ENCOUNTER — LAB VISIT (OUTPATIENT)
Dept: LAB | Facility: HOSPITAL | Age: 28
End: 2018-05-07
Payer: COMMERCIAL

## 2018-05-07 VITALS
DIASTOLIC BLOOD PRESSURE: 66 MMHG | OXYGEN SATURATION: 97 % | WEIGHT: 109 LBS | HEIGHT: 63 IN | TEMPERATURE: 99 F | SYSTOLIC BLOOD PRESSURE: 104 MMHG | HEART RATE: 80 BPM | BODY MASS INDEX: 19.31 KG/M2

## 2018-05-07 DIAGNOSIS — Z00.00 ANNUAL PHYSICAL EXAM: Primary | ICD-10-CM

## 2018-05-07 DIAGNOSIS — R53.83 FATIGUE, UNSPECIFIED TYPE: ICD-10-CM

## 2018-05-07 DIAGNOSIS — Z00.00 ANNUAL PHYSICAL EXAM: ICD-10-CM

## 2018-05-07 LAB
ALBUMIN SERPL BCP-MCNC: 3.8 G/DL
ALP SERPL-CCNC: 47 U/L
ALT SERPL W/O P-5'-P-CCNC: 14 U/L
ANION GAP SERPL CALC-SCNC: 10 MMOL/L
AST SERPL-CCNC: 18 U/L
BASOPHILS # BLD AUTO: 0.03 K/UL
BASOPHILS NFR BLD: 0.5 %
BILIRUB SERPL-MCNC: 0.7 MG/DL
BUN SERPL-MCNC: 12 MG/DL
CALCIUM SERPL-MCNC: 9.2 MG/DL
CHLORIDE SERPL-SCNC: 104 MMOL/L
CHOLEST SERPL-MCNC: 200 MG/DL
CHOLEST/HDLC SERPL: 3.3 {RATIO}
CO2 SERPL-SCNC: 23 MMOL/L
CREAT SERPL-MCNC: 0.6 MG/DL
DIFFERENTIAL METHOD: ABNORMAL
EOSINOPHIL # BLD AUTO: 0.1 K/UL
EOSINOPHIL NFR BLD: 0.9 %
ERYTHROCYTE [DISTWIDTH] IN BLOOD BY AUTOMATED COUNT: 12.8 %
EST. GFR  (AFRICAN AMERICAN): >60 ML/MIN/1.73 M^2
EST. GFR  (NON AFRICAN AMERICAN): >60 ML/MIN/1.73 M^2
GLUCOSE SERPL-MCNC: 89 MG/DL
HCT VFR BLD AUTO: 40.8 %
HDLC SERPL-MCNC: 61 MG/DL
HDLC SERPL: 30.5 %
HGB BLD-MCNC: 13.4 G/DL
IMM GRANULOCYTES # BLD AUTO: 0 K/UL
IMM GRANULOCYTES NFR BLD AUTO: 0 %
LDLC SERPL CALC-MCNC: 122.6 MG/DL
LYMPHOCYTES # BLD AUTO: 2.8 K/UL
LYMPHOCYTES NFR BLD: 50.1 %
MCH RBC QN AUTO: 30 PG
MCHC RBC AUTO-ENTMCNC: 32.8 G/DL
MCV RBC AUTO: 92 FL
MONOCYTES # BLD AUTO: 0.6 K/UL
MONOCYTES NFR BLD: 11.2 %
NEUTROPHILS # BLD AUTO: 2.1 K/UL
NEUTROPHILS NFR BLD: 37.3 %
NONHDLC SERPL-MCNC: 139 MG/DL
NRBC BLD-RTO: 0 /100 WBC
PLATELET # BLD AUTO: 284 K/UL
PMV BLD AUTO: 10.2 FL
POTASSIUM SERPL-SCNC: 4.2 MMOL/L
PROT SERPL-MCNC: 7 G/DL
RBC # BLD AUTO: 4.46 M/UL
SODIUM SERPL-SCNC: 137 MMOL/L
T4 FREE SERPL-MCNC: 0.97 NG/DL
TRIGL SERPL-MCNC: 82 MG/DL
TSH SERPL DL<=0.005 MIU/L-ACNC: 0.7 UIU/ML
WBC # BLD AUTO: 5.63 K/UL

## 2018-05-07 PROCEDURE — 84443 ASSAY THYROID STIM HORMONE: CPT

## 2018-05-07 PROCEDURE — 80053 COMPREHEN METABOLIC PANEL: CPT

## 2018-05-07 PROCEDURE — 36415 COLL VENOUS BLD VENIPUNCTURE: CPT | Mod: PO

## 2018-05-07 PROCEDURE — 3008F BODY MASS INDEX DOCD: CPT | Mod: CPTII,S$GLB,, | Performed by: NURSE PRACTITIONER

## 2018-05-07 PROCEDURE — 85025 COMPLETE CBC W/AUTO DIFF WBC: CPT

## 2018-05-07 PROCEDURE — 99999 PR PBB SHADOW E&M-EST. PATIENT-LVL IV: CPT | Mod: PBBFAC,,, | Performed by: NURSE PRACTITIONER

## 2018-05-07 PROCEDURE — 80061 LIPID PANEL: CPT

## 2018-05-07 PROCEDURE — 99395 PREV VISIT EST AGE 18-39: CPT | Mod: S$GLB,,, | Performed by: NURSE PRACTITIONER

## 2018-05-07 PROCEDURE — 84439 ASSAY OF FREE THYROXINE: CPT

## 2018-05-07 NOTE — PROGRESS NOTES
History of Present Illness   Earlene Nassar is a 27 y.o. woman who presents today for annual physical exam with routine blood work. She denies medical history. She is followed by OBGYN at [facility]. She reports that she has noticed some overall fatigue and lightheadedness over the past few months. She denies associated chest pain, palpitations, shortness of breath, or LOC associated with symptoms. She states she does not monitor her diet but does ear a variety of foods. She does not exercise, but does stay active with her 14 month old son. She is otherwise healthy on today's visit and up to date on healthcare maintenance. She has no additional complaints and is otherwise healthy on today's visit.    History reviewed. No pertinent past medical history.    Past Surgical History:   Procedure Laterality Date    ADENOIDECTOMY      TONSILLECTOMY         Social History     Social History    Marital status: Single     Spouse name: N/A    Number of children: N/A    Years of education: N/A     Social History Main Topics    Smoking status: Never Smoker    Smokeless tobacco: Never Used    Alcohol use Yes      Comment: occasionally    Drug use: No    Sexual activity: Yes     Partners: Male     Other Topics Concern    None     Social History Narrative    None       Family History   Problem Relation Age of Onset    No Known Problems Mother     Heart disease Father     No Known Problems Maternal Grandmother     No Known Problems Maternal Grandfather     No Known Problems Paternal Grandmother     No Known Problems Paternal Grandfather     Breast cancer Neg Hx     Colon cancer Neg Hx     Ovarian cancer Neg Hx     Cervical cancer Neg Hx     Endometrial cancer Neg Hx     Vaginal cancer Neg Hx        Review of Systems  Review of Systems   Constitutional: Positive for malaise/fatigue.   Eyes: Negative for discharge.   Respiratory: Negative for shortness of breath and wheezing.    Cardiovascular:  "Negative for chest pain and palpitations.   Gastrointestinal: Negative for blood in stool, constipation, diarrhea and vomiting.   Genitourinary: Negative for dysuria and hematuria.   Musculoskeletal: Negative for neck pain.   Neurological: Positive for dizziness. Negative for weakness and headaches.   Endo/Heme/Allergies: Negative for polydipsia.     A complete review of systems was otherwise negative.    Physical Exam  /66 (BP Location: Right arm, Patient Position: Sitting, BP Method: Medium (Manual))   Pulse 80   Temp 98.5 °F (36.9 °C) (Oral)   Ht 5' 3" (1.6 m)   Wt 49.4 kg (109 lb)   LMP 04/30/2018 (Approximate)   SpO2 97%   BMI 19.31 kg/m²   General appearance: alert, appears stated age, cooperative and no distress  Eyes: negative findings: lids and lashes normal and conjunctivae and sclerae normal  Ears: normal TM's and external ear canals both ears  Nose: Nares normal. Septum midline. Mucosa normal. No drainage or sinus tenderness.  Throat: lips, mucosa, and tongue normal; teeth and gums normal  Neck: supple, symmetrical, trachea midline and thyroid not enlarged, symmetric, no tenderness/mass/nodules  Back: symmetric, no curvature. ROM normal. No CVA tenderness.  Lungs: clear to auscultation bilaterally  Heart: regular rate and rhythm, S1, S2 normal, no murmur, click, rub or gallop  Abdomen: soft, non-tender; bowel sounds normal; no masses,  no organomegaly  Extremities: extremities normal, atraumatic, no cyanosis or edema  Pulses: 2+ and symmetric  Skin: Skin color, texture, turgor normal. No rashes or lesions  Lymph nodes: Cervical, supraclavicular, and axillary nodes normal.  Neurologic: Grossly normal    Assessment/Plan  Annual physical exam  Age appropriate screening recommendations and healthcare maintenance discussed with patient.  Discussed appropriate diet and exercise.  Labs as listed below.  -     TSH; Future; Expected date: 05/07/2018  -     T4, free; Future; Expected date: " 05/07/2018  -     CBC auto differential; Future; Expected date: 05/07/2018  -     Comprehensive metabolic panel; Future; Expected date: 05/07/2018  -     LIPID PANEL; Future; Expected date: 05/07/2018    Fatigue, unspecified type  We will check labs as listed below.  We discussed possible causes of her symptoms hypotension, hypoglycemia, anemia, thyroid dysfunction. No clear etiology today, will proceed pending lab results.  ER precautions discussed.  -     TSH; Future; Expected date: 05/07/2018  -     T4, free; Future; Expected date: 05/07/2018  -     CBC auto differential; Future; Expected date: 05/07/2018  -     Comprehensive metabolic panel; Future; Expected date: 05/07/2018    She has verbalized understanding and is in agreement with plan of care.    Follow-up in about 1 year (around 5/7/2019), or if symptoms worsen or fail to improve, for annual phyical exam pending lab results.        Answers for HPI/ROS submitted by the patient on 5/7/2018   activity change: No  unexpected weight change: No  rhinorrhea: No  trouble swallowing: No  visual disturbance: No  chest tightness: No  polyuria: No  difficulty urinating: No  menstrual problem: No  joint swelling: No  arthralgias: No  confusion: No  dysphoric mood: No

## 2018-05-07 NOTE — PATIENT INSTRUCTIONS
Prevention Guidelines, Women Ages 18 to 39  Screening tests and vaccines are an important part of managing your health. Health counseling is essential, too. Below are guidelines for these, for women ages 18 to 39. Talk with your healthcare provider to make sure youre up-to-date on what you need.  Screening Who needs it How often   Alcohol misuse All women in this age group At routine exams   Blood pressure All women in this age group Every 2 years if your blood pressure is less than 120/80 mm Hg; yearly if your systolic blood pressure is 120 to 139 mm Hg, or your diastolic blood pressure reading is 80 to 89 mm Hg   Breast cancer All women in this age group should talk with their healthcare providers about the need for clinical breast exams (CBE)1 Clinical breast exam every 3 years1   Cervical cancer Women ages 21 and older Women between ages 21 and 29 should have a Pap test every 3 years; women between ages 30 and 65 are advised to have a Pap test plus an HPV test every 5 years   Chlamydia Sexually active women ages 24 and younger, and women at increased risk for infection Every 3 years if you're at risk or have symptoms   Depression All women in this age group At routine exams   Diabetes mellitus, type 2 Adults with no symptoms who are overweight or obese and have 1 or more other risk factors for diabetes At least every 3 years   Gonorrhea Sexually active women at increased risk for infection At routine exams   Hepatitis C Anyone at increased risk At routine exams   HIV All women At routine exams   Obesity All women in this age group At routine exams   Syphilis Women at increased risk for infection - talk with your healthcare provider At routine exams   Tuberculosis Women at increased risk for infection - talk with your healthcare provider Ask your healthcare provider   Vision All women in this age group At least 1 complete exam in your 20s, and 2 in your 30s   Vaccine2 Who needs it How often   Chickenpox  (varicella) All women in this age group who have no record of this infection or vaccine 2 doses; the second dose should be given 4 to 8 weeks after the first dose   Hepatitis A Women at increased risk for infection - talk with your healthcare provider 2 doses given at least 6 months apart   Hepatitis B Women at increased risk for infection - talk with your healthcare provider 3 doses over 6 months; second dose should be given 1 month after the first dose; the third dose should be given at least 2 months after the second dose and at least 4 months after the first dose   Haemophilus influenzae Type B (HIB) Women at increased risk for infection - talk with your healthcare provider 1 to 3 doses   Human papillomavirus (HPV) All women in this age group up to age 26 3 doses; the second dose should be given 1 to 2 months after the first dose and the third dose given 6 months after the first dose   Influenza (flu) All women in this age group Once a year   Measles, mumps, rubella (MMR) All women in this age group who have no record of these infections or vaccines 1 or 2 doses   Meningococcal Women at increased risk for infection - talk with your healthcare provider 1 or more doses   Pneumococcal conjugate vaccine (PCV13) and pneumococcal polysaccharide vaccine (PPSV23) Women at increased risk for infection - talk with your healthcare provider PCV13: 1 dose ages 19 to 65 (protects against 13 types of pneumococcal bacteria)  PPSV23: 1 to 2 doses through age 64, or 1 dose at 65 or older (protects against 23 types of pneumococcal bacteria)      Tetanus/diphtheria/pertussis (Td/Tdap) booster All women in this age group Td every 10 years, or a one-time dose of Tdap instead of a Td booster after age 18, then Td every 10 years   Counseling Who needs it How often   BRCA gene mutation testing for breast and ovarian cancer susceptibility Women with increased risk for having gene mutation When your risk is known   Breast cancer and  chemoprevention Women at high risk for breast cancer When your risk is known   Diet and exercise Women who are overweight or obese When diagnosed, and then at routine exams   Domestic violence Women at the age in which they are able to have children At routine exams   Sexually transmitted infection prevention Women who are sexually active At routine exams   Skin cancer Prevention of skin cancer in fair-skinned adults through age 24 At routine exams   Use of tobacco and the health effects it can cause All women in this age group Every visit   1According to the ACS, women ages 20 to 39 years should have a clinical breast exam (CBE) as part of their routine health exam every 3 years. Breast self-exams are an option for women starting in their 20s. But the  USPSTF does not recommend CBE.  2Those who are 18 years old and not up-to-date on their childhood vaccines should get all appropriate catch-up vaccines recommended by the CDC.  Date Last Reviewed: 8/27/2015  © 7078-4786 [employer] [address] [place] [address], [place] [address]. All rights reserved. This information is not intended as a substitute for professional medical care. Always follow your healthcare professional's instructions.

## 2018-05-08 ENCOUNTER — PATIENT MESSAGE (OUTPATIENT)
Dept: FAMILY MEDICINE | Facility: CLINIC | Age: 28
End: 2018-05-08

## 2018-05-29 ENCOUNTER — PATIENT MESSAGE (OUTPATIENT)
Dept: OBSTETRICS AND GYNECOLOGY | Facility: CLINIC | Age: 28
End: 2018-05-29

## 2018-05-31 ENCOUNTER — OFFICE VISIT (OUTPATIENT)
Dept: OBSTETRICS AND GYNECOLOGY | Facility: CLINIC | Age: 28
End: 2018-05-31
Attending: OBSTETRICS & GYNECOLOGY
Payer: COMMERCIAL

## 2018-05-31 VITALS
SYSTOLIC BLOOD PRESSURE: 120 MMHG | WEIGHT: 109.44 LBS | BODY MASS INDEX: 19.39 KG/M2 | DIASTOLIC BLOOD PRESSURE: 78 MMHG | HEIGHT: 63 IN

## 2018-05-31 DIAGNOSIS — F41.9 ANXIETY: ICD-10-CM

## 2018-05-31 DIAGNOSIS — Z01.419 ENCOUNTER FOR GYNECOLOGICAL EXAMINATION: Primary | ICD-10-CM

## 2018-05-31 PROCEDURE — 99395 PREV VISIT EST AGE 18-39: CPT | Mod: S$GLB,,, | Performed by: OBSTETRICS & GYNECOLOGY

## 2018-05-31 PROCEDURE — 3078F DIAST BP <80 MM HG: CPT | Mod: CPTII,S$GLB,, | Performed by: OBSTETRICS & GYNECOLOGY

## 2018-05-31 PROCEDURE — 3074F SYST BP LT 130 MM HG: CPT | Mod: CPTII,S$GLB,, | Performed by: OBSTETRICS & GYNECOLOGY

## 2018-05-31 PROCEDURE — 99999 PR PBB SHADOW E&M-EST. PATIENT-LVL III: CPT | Mod: PBBFAC,,, | Performed by: OBSTETRICS & GYNECOLOGY

## 2018-05-31 RX ORDER — NORETHINDRONE ACETATE AND ETHINYL ESTRADIOL .02; 1 MG/1; MG/1
1 TABLET ORAL DAILY
Qty: 84 TABLET | Refills: 3 | Status: SHIPPED | OUTPATIENT
Start: 2018-05-31 | End: 2019-01-04

## 2018-05-31 RX ORDER — ALPRAZOLAM 0.5 MG/1
0.5 TABLET ORAL 3 TIMES DAILY PRN
Qty: 30 TABLET | Refills: 0 | Status: SHIPPED | OUTPATIENT
Start: 2018-05-31 | End: 2019-06-07

## 2018-05-31 NOTE — PROGRESS NOTES
"CC: Well woman exam    Earlene Nassar is a 28 y.o. female  presents for a well woman exam.  Having daily anxiety.  [place] had to pull over and have her mother pick her up.      Normal menses. Off ocps does not want to get pregnant. Not having any incontinence.     History reviewed. No pertinent past medical history.    Past Surgical History:   Procedure Laterality Date    ADENOIDECTOMY      TONSILLECTOMY         OB History    Para Term  AB Living   1 1 1     1   SAB TAB Ectopic Multiple Live Births         0 1      # Outcome Date GA Lbr Andrew/2nd Weight Sex Delivery Anes PTL Lv   1 Term 19 39w0d  3.402 kg (7 lb 8 oz) M Vag-Spont EPI N JJ      Birth Comments: 3rd degree laceration repair, was in pelvic floor PT for weakness      Obstetric Comments   Menarche ~14       Family History   Problem Relation Age of Onset    No Known Problems Mother     Heart disease Father     No Known Problems Maternal Grandmother     No Known Problems Maternal Grandfather     No Known Problems Paternal Grandmother     No Known Problems Paternal Grandfather     Breast cancer Neg Hx     Colon cancer Neg Hx     Ovarian cancer Neg Hx     Cervical cancer Neg Hx     Endometrial cancer Neg Hx     Vaginal cancer Neg Hx        Social History   Substance Use Topics    Smoking status: Never Smoker    Smokeless tobacco: Never Used    Alcohol use Yes      Comment: occasionally       /78   Ht 5' 3" (1.6 m)   Wt 49.6 kg (109 lb 7.3 oz)   LMP 2018   BMI 19.39 kg/m²     ROS:  GENERAL: Denies weight gain or weight loss. Feeling well overall.   SKIN: Denies rash or lesions.   HEAD: Denies head injury or headache.   NODES: Denies enlarged lymph nodes.   CHEST: Denies chest pain or shortness of breath.   CARDIOVASCULAR: Denies palpitations or left sided chest pain.   ABDOMEN: No abdominal pain, constipation, diarrhea, nausea, vomiting or rectal " bleeding.   URINARY: No frequency, dysuria, hematuria, or burning on urination.  REPRODUCTIVE: See HPI.   BREASTS: The patient performs breast self-examination and denies pain, lumps, or nipple discharge.   HEMATOLOGIC: No easy bruisability or excessive bleeding.  MUSCULOSKELETAL: Denies joint pain or swelling.   NEUROLOGIC: Denies syncope or weakness.   PSYCHIATRIC: Denies depression, anxiety or mood swings.    Physical Exam:    APPEARANCE: Well nourished, well developed, in no acute distress.  AFFECT: WNL, alert and oriented x 3  SKIN: No acne or hirsutism  NECK: Neck symmetric without masses or thyromegaly  NODES: No inguinal, cervical, axillary, or femoral lymph node enlargement  CHEST: Good respiratory effect  ABDOMEN: Soft.  No tenderness or masses.  No hepatosplenomegaly.  No hernias.  BREASTS: Symmetrical, no skin changes or visible lesions.  No palpable masses, nipple discharge bilaterally.  PELVIC: Normal external genitalia without lesions.  Normal hair distribution.  Adequate perineal body, normal urethral meatus.  Vagina moist and well rugated without lesions or discharge.  Cervix pink, without lesions, discharge or tenderness.  No significant cystocele or rectocele.  Bimanual exam shows uterus to be normal size, regular, mobile and nontender.  Adnexa without masses or tenderness.    EXTREMITIES: No edema.    ASSESSMENT AND PLAN  1. Encounter for gynecological examination  norethindrone-ethinyl estradiol (MICROGESTIN 1/20) 1-20 mg-mcg per tablet   2. Anxiety  ALPRAZolam (XANAX) 0.5 MG tablet       Patient was counseled today on A.C.S. Pap guidelines and recommendations for yearly pelvic exams, mammograms and monthly self breast exams; to see her PCP for other health maintenance.     Recommend [facility].  Needs behavioral therapy and possibly meds.  Will give xanax to bridge her.     Follow-up in about 1 year (around 5/31/2019).

## 2018-06-04 ENCOUNTER — PATIENT MESSAGE (OUTPATIENT)
Dept: OBSTETRICS AND GYNECOLOGY | Facility: CLINIC | Age: 28
End: 2018-06-04

## 2018-06-04 RX ORDER — ESCITALOPRAM OXALATE 10 MG/1
10 TABLET ORAL DAILY
Qty: 30 TABLET | Refills: 11 | Status: SHIPPED | OUTPATIENT
Start: 2018-06-04 | End: 2019-01-04

## 2018-07-18 ENCOUNTER — PATIENT MESSAGE (OUTPATIENT)
Dept: ELECTROPHYSIOLOGY | Facility: CLINIC | Age: 28
End: 2018-07-18

## 2018-07-26 ENCOUNTER — HOSPITAL ENCOUNTER (EMERGENCY)
Facility: HOSPITAL | Age: 28
Discharge: HOME | End: 2018-07-26
Attending: EMERGENCY MEDICINE
Payer: COMMERCIAL

## 2018-07-26 VITALS
WEIGHT: 100 LBS | HEART RATE: 92 BPM | DIASTOLIC BLOOD PRESSURE: 90 MMHG | RESPIRATION RATE: 19 BRPM | BODY MASS INDEX: 17.72 KG/M2 | SYSTOLIC BLOOD PRESSURE: 130 MMHG | TEMPERATURE: 98 F | HEIGHT: 63 IN | OXYGEN SATURATION: 100 %

## 2018-07-26 DIAGNOSIS — F41.0 PANIC ATTACKS: Primary | ICD-10-CM

## 2018-07-26 PROCEDURE — 99282 EMERGENCY DEPT VISIT SF MDM: CPT

## 2018-07-26 NOTE — ED PROVIDER NOTES
Encounter Date: 7/26/2018       History     Chief Complaint   Patient presents with    Chest Pain     Pt states she hasn't felt good all day and tonight she was lying in bed and started having chest tightness that radiated down left arm. Reports nausea and diarrhea     The patient admits to recent anxiety.  Patient states her father had his fourth heart attack in his 50s.   denies any other co-associated symptoms that she has a pressure sensation in the left chest and radiates into her arm she denies any nausea vomiting diaphoresis shortness of breath or any exertional-type symptomatology.          Review of patient's allergies indicates:   Allergen Reactions    Codeine Other (See Comments)     Per pt's mother she had rash as a child but took narcotics after Tonsillectomy without problems     History reviewed. No pertinent past medical history.  Past Surgical History:   Procedure Laterality Date    ADENOIDECTOMY      TONSILLECTOMY       Family History   Problem Relation Age of Onset    No Known Problems Mother     Heart disease Father     No Known Problems Maternal Grandmother     No Known Problems Maternal Grandfather     No Known Problems Paternal Grandmother     No Known Problems Paternal Grandfather     Breast cancer Neg Hx     Colon cancer Neg Hx     Ovarian cancer Neg Hx     Cervical cancer Neg Hx     Endometrial cancer Neg Hx     Vaginal cancer Neg Hx      Social History   Substance Use Topics    Smoking status: Never Smoker    Smokeless tobacco: Never Used    Alcohol use Yes      Comment: occasionally     Review of Systems   Constitutional: Negative.  Negative for diaphoresis.   HENT: Negative.    Eyes: Negative.    Respiratory: Negative.  Negative for shortness of breath.    Cardiovascular: Positive for chest pain.   Gastrointestinal: Negative.  Negative for nausea and vomiting.   Endocrine: Negative.    Genitourinary: Negative.    Musculoskeletal: Negative.    Skin: Negative.     Allergic/Immunologic: Negative.    Neurological: Negative.    Hematological: Negative.    Psychiatric/Behavioral: The patient is nervous/anxious (I had a full-blown panic attack last).    All other systems reviewed and are negative.      Physical Exam     Initial Vitals [07/26/18 0413]   BP Pulse Resp Temp SpO2   (!) 130/90 92 19 98.3 °F (36.8 °C) 100 %      MAP       --         Physical Exam    Nursing note and vitals reviewed.  Constitutional: Vital signs are normal. She appears well-developed. She is active and cooperative.   HENT:   Head: Normocephalic and atraumatic.   Eyes: Conjunctivae, EOM and lids are normal. Pupils are equal, round, and reactive to light.   Neck: Trachea normal and full passive range of motion without pain. Neck supple. No thyroid mass present.   Cardiovascular: Normal rate, regular rhythm, S1 normal, S2 normal, normal heart sounds, intact distal pulses and normal pulses.   Pulmonary/Chest: Effort normal and breath sounds normal.   Patient has short little rabbit type breaths and is perspiring approximately 28 times per minute.  I asked the patient if she feels like she is hyperventilating and she tells me now   Abdominal: Soft. Normal appearance, normal aorta and bowel sounds are normal.   Musculoskeletal: Normal range of motion.   Lymphadenopathy:     She has no axillary adenopathy.   Neurological: She is alert and oriented to person, place, and time.   Skin: Skin is warm, dry and intact.   Psychiatric: She has a normal mood and affect. Her speech is normal and behavior is normal. Judgment and thought content normal. Cognition and memory are normal.         ED Course   Procedures  Labs Reviewed - No data to display  EKG Readings: (Independently Interpreted)   Rhythm: Normal Sinus Rhythm. Heart Rate: 82. Ectopy: No Ectopy. Conduction: Normal. ST Segments: Normal ST Segments. T Waves: Normal. Axis: Normal. Clinical Impression: Normal Sinus Rhythm       Imaging Results    None              Additional MDM:   PERC Rule:   Age is greater than or equal to 50 = 0.0  Heart Rate is greater than or equal to 100 = 0.0  SaO2 on room air < 95% = 0.0  Unilateral leg swelling = 0.0  Hemoptysis = 0.0  Recent surgery or trauma = 0.0  Prior PE or DVT =  0.0  Hormone use = 0.00  PERC Score = 0    Well's Criteria Score:  -Clinical symptoms of DVT (leg swelling, pain with palpation) = 0.0  -Other diagnosis less likely than pulmonary embolism =            0.0  -Heart Rate >100 =   0.0  -Immobilization (= or > than 3 days) or surgery in the previous 4 weeks = 0.0  -Previous DVT/PE = 0.0  -Hemoptysis =          0.0  -Malignancy =           0.0  Well's Probability Score =    0      Heart Score:    History:          Slightly suspicious.  ECG:             Normal  Age:               Less than 45 years  Risk factors: no risk factors known  Troponin:       Less than or equal to normal limit  Final Score: 0      SAMMY Score:   Age over 65:                                    0.00   > or = to 3 CAD risk factors:           0.00  Established CAD:                            0.00  > or = to 2 anginal events in the past 24 hours: 0.00  Use of ASA in past 7 days:              0.00  Elevated Enzymes:                         0.00  ST Depression > or = to 0.05 mV:  0.00  SAMMY score: 0                   Clinical Impression:   The encounter diagnosis was Panic attacks.                             Sha Smith MD  07/26/18 0578

## 2018-08-01 ENCOUNTER — OFFICE VISIT (OUTPATIENT)
Dept: CARDIOLOGY | Facility: CLINIC | Age: 28
End: 2018-08-01
Payer: COMMERCIAL

## 2018-08-01 VITALS
DIASTOLIC BLOOD PRESSURE: 74 MMHG | HEIGHT: 63 IN | HEART RATE: 64 BPM | SYSTOLIC BLOOD PRESSURE: 116 MMHG | BODY MASS INDEX: 18.95 KG/M2 | WEIGHT: 106.94 LBS

## 2018-08-01 DIAGNOSIS — F41.0 PANIC ANXIETY SYNDROME: ICD-10-CM

## 2018-08-01 DIAGNOSIS — R07.89 CHEST PAIN, NON-CARDIAC: Primary | ICD-10-CM

## 2018-08-01 PROCEDURE — 3078F DIAST BP <80 MM HG: CPT | Mod: CPTII,S$GLB,, | Performed by: INTERNAL MEDICINE

## 2018-08-01 PROCEDURE — 99204 OFFICE O/P NEW MOD 45 MIN: CPT | Mod: S$GLB,,, | Performed by: INTERNAL MEDICINE

## 2018-08-01 PROCEDURE — 99999 PR PBB SHADOW E&M-EST. PATIENT-LVL III: CPT | Mod: PBBFAC,,, | Performed by: INTERNAL MEDICINE

## 2018-08-01 PROCEDURE — 3008F BODY MASS INDEX DOCD: CPT | Mod: CPTII,S$GLB,, | Performed by: INTERNAL MEDICINE

## 2018-08-01 PROCEDURE — 3074F SYST BP LT 130 MM HG: CPT | Mod: CPTII,S$GLB,, | Performed by: INTERNAL MEDICINE

## 2018-08-01 NOTE — PROGRESS NOTES
"Subjective:   Patient ID:  Earlene Nassar is a 28 y.o. female who presents for follow-up of Chest Pain; Arm Pain; Palpitations; and Shortness of Breath    HPI:   Pt is here to establish care with cardiology. She is a pleasant 27 y/o woman without chronic medical conditions.  She has no risk factors but does have a family history- father with premature CAD however with cardiac risk factors.  She was recently seen in the ED for chest pain with radiation to her arm.  She has ah/o anxiety and panic attacks. ED evaluation was benign- ECG normal.  She states that intellectually she recognizes that her symptoms are related to anxiety however she "would like to hear it from a cardiologist".  Her father has had MIs and recently had another.   She denies any exertional CP or sxs consistent with angina.  She states that she had regularly seen a pediatrician when younger and was never identified as having congenital disease. She states that she did have an echo a few years ago for similar symptoms and the echo was normal        Vitals:    08/01/18 1712   BP: 116/74   Pulse: 64   Weight: 48.5 kg (106 lb 14.8 oz)   Height: 5' 3" (1.6 m)     Body mass index is 18.94 kg/m².  CrCl cannot be calculated (Patient's most recent lab result is older than the maximum 7 days allowed.).    Lab Results   Component Value Date     05/07/2018    K 4.2 05/07/2018     05/07/2018    CO2 23 05/07/2018    BUN 12 05/07/2018    CREATININE 0.6 05/07/2018    GLU 89 05/07/2018    AST 18 05/07/2018    ALT 14 05/07/2018    ALBUMIN 3.8 05/07/2018    PROT 7.0 05/07/2018    BILITOT 0.7 05/07/2018    WBC 5.63 05/07/2018    HGB 13.4 05/07/2018    HCT 40.8 05/07/2018    MCV 92 05/07/2018     05/07/2018    TSH 0.705 05/07/2018    CHOL 200 (H) 05/07/2018    HDL 61 05/07/2018    LDLCALC 122.6 05/07/2018    TRIG 82 05/07/2018       Current Outpatient Prescriptions   Medication Sig    escitalopram oxalate (LEXAPRO) 10 MG tablet Take 1 tablet " (10 mg total) by mouth once daily. Take half tab x 5 days then increase to one daily    norethindrone-ethinyl estradiol (MICROGESTIN 1/20) 1-20 mg-mcg per tablet Take 1 tablet by mouth once daily.    ALPRAZolam (XANAX) 0.5 MG tablet Take 1 tablet (0.5 mg total) by mouth 3 (three) times daily as needed for Insomnia or Anxiety.     No current facility-administered medications for this visit.        Review of Systems   Constitution: Negative for malaise/fatigue.   Eyes: Negative for visual disturbance.   Cardiovascular: Positive for chest pain. Negative for claudication, dyspnea on exertion, irregular heartbeat, near-syncope, orthopnea and palpitations.   Respiratory: Negative for shortness of breath and sleep disturbances due to breathing.    Musculoskeletal: Negative for muscle cramps, muscle weakness and myalgias.   Gastrointestinal: Negative for abdominal pain and heartburn.   Genitourinary: Negative for nocturia.   Neurological: Negative for difficulty with concentration, excessive daytime sleepiness, light-headedness, loss of balance, paresthesias and vertigo.       Objective:   Physical Exam   Constitutional: She is oriented to person, place, and time. She appears well-developed and well-nourished.   HENT:   Head: Normocephalic and atraumatic.   Pulmonary/Chest: Effort normal.   Neurological: She is alert and oriented to person, place, and time.   Psychiatric: She has a normal mood and affect. Her behavior is normal. Judgment and thought content normal.       Assessment:     1. Chest pain, non-cardiac    2. Panic anxiety syndrome        Plan:   Reassured patient.  She is at a very low risk for disease and her symptoms are non-anginal.  History and sxs are most consistent with panic attacks and anxiety.  We had a lengthy discussion (30 mintues) on panic attacks and anxiety and methods to reduce her symptoms.  She is seeing a therapist and has started yoga.  We also discussed plant based nutrition and its  benefit on cardiovascular health and risk factors  F/u PRN

## 2018-08-21 ENCOUNTER — PATIENT MESSAGE (OUTPATIENT)
Dept: OBSTETRICS AND GYNECOLOGY | Facility: CLINIC | Age: 28
End: 2018-08-21

## 2018-08-23 RX ORDER — SERTRALINE HYDROCHLORIDE 25 MG/1
25 TABLET, FILM COATED ORAL DAILY
Qty: 30 TABLET | Refills: 6 | Status: SHIPPED | OUTPATIENT
Start: 2018-08-23 | End: 2019-01-04

## 2018-09-24 ENCOUNTER — OFFICE VISIT (OUTPATIENT)
Dept: FAMILY MEDICINE | Facility: CLINIC | Age: 28
End: 2018-09-24
Payer: COMMERCIAL

## 2018-09-24 VITALS
SYSTOLIC BLOOD PRESSURE: 108 MMHG | DIASTOLIC BLOOD PRESSURE: 84 MMHG | HEIGHT: 63 IN | BODY MASS INDEX: 18.78 KG/M2 | OXYGEN SATURATION: 98 % | TEMPERATURE: 99 F | WEIGHT: 106 LBS | HEART RATE: 73 BPM

## 2018-09-24 DIAGNOSIS — R51.9 SINUS HEADACHE: Primary | ICD-10-CM

## 2018-09-24 PROCEDURE — 99999 PR PBB SHADOW E&M-EST. PATIENT-LVL IV: CPT | Mod: PBBFAC,,, | Performed by: NURSE PRACTITIONER

## 2018-09-24 PROCEDURE — 99214 OFFICE O/P EST MOD 30 MIN: CPT | Mod: S$GLB,,, | Performed by: NURSE PRACTITIONER

## 2018-09-24 PROCEDURE — 3008F BODY MASS INDEX DOCD: CPT | Mod: CPTII,S$GLB,, | Performed by: NURSE PRACTITIONER

## 2018-09-24 PROCEDURE — 3079F DIAST BP 80-89 MM HG: CPT | Mod: CPTII,S$GLB,, | Performed by: NURSE PRACTITIONER

## 2018-09-24 PROCEDURE — 3074F SYST BP LT 130 MM HG: CPT | Mod: CPTII,S$GLB,, | Performed by: NURSE PRACTITIONER

## 2018-09-24 RX ORDER — NAPROXEN 500 MG/1
500 TABLET ORAL 2 TIMES DAILY WITH MEALS
Qty: 30 TABLET | Refills: 2 | Status: SHIPPED | OUTPATIENT
Start: 2018-09-24 | End: 2018-10-09

## 2018-09-24 RX ORDER — AZELASTINE 1 MG/ML
SPRAY, METERED NASAL
COMMUNITY
Start: 2018-09-18 | End: 2019-06-07

## 2018-09-24 NOTE — PROGRESS NOTES
History of Present Illness   Earlene Nassar is a 28 y.o. woman with medical history as listed below who presents today for evaluation of right sided/frontal headache. Headache is described as a dull ache that is always present and does not seem to worsen. Of note, she has been on antibiotics for one month for otitis media, right ear, and she is being followed by ENT. She reports relief in headache with sudafed and ibuprofen. She has no fevers or chills. She has no associated symptoms with headache. She has no additional complaints and is otherwise healthy on today's visit.      History reviewed. No pertinent past medical history.    Past Surgical History:   Procedure Laterality Date    ADENOIDECTOMY      TONSILLECTOMY         Social History     Socioeconomic History    Marital status: Single     Spouse name: None    Number of children: None    Years of education: None    Highest education level: None   Social Needs    Financial resource strain: None    Food insecurity - worry: None    Food insecurity - inability: None    Transportation needs - medical: None    Transportation needs - non-medical: None   Occupational History    None   Tobacco Use    Smoking status: Never Smoker    Smokeless tobacco: Never Used   Substance and Sexual Activity    Alcohol use: Yes     Comment: occasionally    Drug use: No    Sexual activity: Yes     Partners: Male     Birth control/protection: None   Other Topics Concern    None   Social History Narrative    None       Family History   Problem Relation Age of Onset    No Known Problems Mother     Heart disease Father     No Known Problems Maternal Grandmother     No Known Problems Maternal Grandfather     No Known Problems Paternal Grandmother     No Known Problems Paternal Grandfather     Breast cancer Neg Hx     Colon cancer Neg Hx     Ovarian cancer Neg Hx     Cervical cancer Neg Hx     Endometrial cancer Neg Hx     Vaginal cancer Neg Hx   "      Review of Systems  Review of Systems   Constitutional: Negative for fever.   HENT: Positive for sinus pain. Negative for congestion, ear pain and sore throat.    Eyes: Negative for blurred vision and double vision.   Respiratory: Negative for cough.    Neurological: Positive for headaches. Negative for dizziness, tingling, sensory change, focal weakness and loss of consciousness.     A complete review of systems was otherwise negative.    Physical Exam  /84 (BP Location: Left arm, Patient Position: Sitting, BP Method: Small (Manual))   Pulse 73   Temp 98.5 °F (36.9 °C) (Oral)   Ht 5' 3" (1.6 m)   Wt 48.1 kg (106 lb)   SpO2 98%   BMI 18.78 kg/m²   General appearance: alert, appears stated age, cooperative and no distress  Eyes: negative findings: lids and lashes normal and conjunctivae and sclerae normal  Ears: bilateral middle ear effusion R>L  Nose: Nares normal. Septum midline. Mucosa normal. No drainage or sinus tenderness.  Throat: lips, mucosa, and tongue normal; teeth and gums normal  Lungs: clear to auscultation bilaterally  Heart: regular rate and rhythm, S1, S2 normal, no murmur, click, rub or gallop  Lymph nodes: Cervical, supraclavicular, and axillary nodes normal.  Neurologic: Grossly normal, no focal neurological deficits    Assessment/Plan  Sinus headache  Naproxen BID with decongestant.   RTC if symptoms persist >1 week.  ER precautions discussed, no red flags on exam.  -     naproxen (EC NAPROSYN) 500 MG EC tablet; Take 1 tablet (500 mg total) by mouth 2 (two) times daily with meals. for 15 days  Dispense: 30 tablet; Refill: 2    She has verbalized understanding and is in agreement with plan of care.    Follow-up if symptoms worsen or fail to improve.  "

## 2018-09-24 NOTE — PATIENT INSTRUCTIONS
Sinus Headaches     When using nasal spray, keep your chin down and angle the spray away from center.     Sinus headaches can cause a gnawing pain behind the nose and eyes. The pain most often gets worse in the afternoon and evening. You may also run a fever. Sinus headaches are caused by colds or allergies that make the nasal passages inflamed or infected.  To help prevent sinus headaches:  · Treat colds promptly to keep mucus from backing up.  · Avoid things that trigger sinus problems, such as pollens, dust, smoke, fumes, and strong odors.  · Take allergy medicines as directed by your healthcare provider.  To relieve the pain:  · Keep your sinuses open and free of mucus. Try over-the-counter sinus rinse products.  · Use a nasal decongestant as directed to reduce the inflammation.  · Drink fluids to keep the mucus thinner. This helps it drain more easily. You can also use a humidifier.  · Apply hot packs to the area around your sinuses. Use a hot water bottle.  · See your healthcare provider if your sinus headache lasts more than 2 weeks. You may need medicine for a sinus infection or an exam to check for other headache conditions, like migraines.  Date Last Reviewed: 10/1/2016  © 7607-2168 [employer]. [address] [place] [address], [place] [address]. All rights reserved. This information is not intended as a substitute for professional medical care. Always follow your healthcare professional's instructions.         detailed exam

## 2018-09-28 ENCOUNTER — HOSPITAL ENCOUNTER (OUTPATIENT)
Dept: RADIOLOGY | Facility: HOSPITAL | Age: 28
Discharge: HOME | End: 2018-09-28
Attending: NURSE PRACTITIONER
Payer: COMMERCIAL

## 2018-09-28 ENCOUNTER — OFFICE VISIT (OUTPATIENT)
Dept: FAMILY MEDICINE | Facility: CLINIC | Age: 28
End: 2018-09-28
Payer: COMMERCIAL

## 2018-09-28 VITALS
TEMPERATURE: 98 F | BODY MASS INDEX: 18.78 KG/M2 | SYSTOLIC BLOOD PRESSURE: 106 MMHG | HEIGHT: 63 IN | DIASTOLIC BLOOD PRESSURE: 74 MMHG | WEIGHT: 106 LBS

## 2018-09-28 DIAGNOSIS — J34.89 SINUS PRESSURE: ICD-10-CM

## 2018-09-28 DIAGNOSIS — J34.89 SINUS PRESSURE: Primary | ICD-10-CM

## 2018-09-28 DIAGNOSIS — H92.03 OTALGIA OF BOTH EARS: ICD-10-CM

## 2018-09-28 PROCEDURE — 99214 OFFICE O/P EST MOD 30 MIN: CPT | Mod: S$GLB,,, | Performed by: NURSE PRACTITIONER

## 2018-09-28 PROCEDURE — 70220 X-RAY EXAM OF SINUSES: CPT | Mod: TC,FY,PO

## 2018-09-28 PROCEDURE — 3074F SYST BP LT 130 MM HG: CPT | Mod: CPTII,S$GLB,, | Performed by: NURSE PRACTITIONER

## 2018-09-28 PROCEDURE — 99999 PR PBB SHADOW E&M-EST. PATIENT-LVL III: CPT | Mod: PBBFAC,,, | Performed by: NURSE PRACTITIONER

## 2018-09-28 PROCEDURE — 3078F DIAST BP <80 MM HG: CPT | Mod: CPTII,S$GLB,, | Performed by: NURSE PRACTITIONER

## 2018-09-28 PROCEDURE — 70220 X-RAY EXAM OF SINUSES: CPT | Mod: 26,,, | Performed by: RADIOLOGY

## 2018-09-28 PROCEDURE — 3008F BODY MASS INDEX DOCD: CPT | Mod: CPTII,S$GLB,, | Performed by: NURSE PRACTITIONER

## 2018-09-28 NOTE — PROGRESS NOTES
History of Present Illness   Earlene Nassar is a 28 y.o. woman with medical history as listed below who presents today for evaluation of right ear pain. After our previous visit, her pain has worsened. She is concerned she may be developing another ear infection. She also reports sinus pressure and headaches. She denies congestion, post nasal drip, or cough. She has no external ear pain and denies drainage from the ear. She has no additional complaints and is otherwise healthy on today's visit.      History reviewed. No pertinent past medical history.    Past Surgical History:   Procedure Laterality Date    ADENOIDECTOMY      TONSILLECTOMY         Social History     Socioeconomic History    Marital status: Single     Spouse name: None    Number of children: None    Years of education: None    Highest education level: None   Social Needs    Financial resource strain: None    Food insecurity - worry: None    Food insecurity - inability: None    Transportation needs - medical: None    Transportation needs - non-medical: None   Occupational History    None   Tobacco Use    Smoking status: Never Smoker    Smokeless tobacco: Never Used   Substance and Sexual Activity    Alcohol use: Yes     Comment: occasionally    Drug use: No    Sexual activity: Yes     Partners: Male     Birth control/protection: None   Other Topics Concern    None   Social History Narrative    None       Family History   Problem Relation Age of Onset    No Known Problems Mother     Heart disease Father     No Known Problems Maternal Grandmother     No Known Problems Maternal Grandfather     No Known Problems Paternal Grandmother     No Known Problems Paternal Grandfather     Breast cancer Neg Hx     Colon cancer Neg Hx     Ovarian cancer Neg Hx     Cervical cancer Neg Hx     Endometrial cancer Neg Hx     Vaginal cancer Neg Hx        Review of Systems  Review of Systems   Constitutional: Negative for fever.   HENT:  "Positive for ear pain and sinus pain. Negative for congestion, ear discharge, hearing loss and sore throat.    Respiratory: Negative for cough.      A complete review of systems was otherwise negative.    Physical Exam  /74 (BP Location: Right arm, Patient Position: Sitting, BP Method: Medium (Manual))   Temp 98.1 °F (36.7 °C) (Oral)   Ht 5' 3" (1.6 m)   Wt 48.1 kg (106 lb)   BMI 18.78 kg/m²   General appearance: alert, appears stated age, cooperative and no distress  Eyes: negative findings: lids and lashes normal and conjunctivae and sclerae normal  Ears: normal external canal with bulging TM and middle ear effusion, bilateral R>L  Nose: Nares normal. Septum midline. Mucosa normal. No drainage, frontal sinus tenderness bilateral  Throat: lips, mucosa, and tongue normal; teeth and gums normal  Neck: supple, symmetrical, trachea midline  Skin: Skin color, texture, turgor normal. No rashes or lesions  Lymph nodes: Cervical, supraclavicular, and axillary nodes normal.  Neurologic: Grossly normal    Assessment/Plan  Sinus pressure  X-ray of sinuses for further evaluation.  Continue the anti-histamine and Flonase.  Continue the Naproxen with Tylenol for breakthrough pain.  -     X-Ray Sinuses Min 3 Views; Future; Expected date: 09/28/2018    Otalgia of both ears  Effusion present with no evidence for otitis media.  No indication for further antibiotics at this time.  I recommend she continue current treatment plan with warm compresses.  Follow-up with ENT next week.  Pending sinus x-ray, may need antibiotics for sinus infection, would consider Doxycycline as she has completed PCN, cephalosporin, and flouroquinolones in the past two months.   -     X-Ray Sinuses Min 3 Views; Future; Expected date: 09/28/2018    She has verbalized understanding and is in agreement with plan of care.  Schedule a follow-up with your ENT.  Follow-up if symptoms worsen or fail to improve.  "

## 2018-10-01 ENCOUNTER — PATIENT MESSAGE (OUTPATIENT)
Dept: FAMILY MEDICINE | Facility: CLINIC | Age: 28
End: 2018-10-01

## 2018-10-01 DIAGNOSIS — R51.9 CHRONIC INTRACTABLE HEADACHE, UNSPECIFIED HEADACHE TYPE: Primary | ICD-10-CM

## 2018-10-01 DIAGNOSIS — G89.29 CHRONIC INTRACTABLE HEADACHE, UNSPECIFIED HEADACHE TYPE: Primary | ICD-10-CM

## 2018-10-10 ENCOUNTER — PATIENT MESSAGE (OUTPATIENT)
Dept: FAMILY MEDICINE | Facility: CLINIC | Age: 28
End: 2018-10-10

## 2018-10-10 DIAGNOSIS — J30.9 CHRONIC ALLERGIC RHINITIS: Primary | ICD-10-CM

## 2018-10-11 ENCOUNTER — PATIENT MESSAGE (OUTPATIENT)
Dept: FAMILY MEDICINE | Facility: CLINIC | Age: 28
End: 2018-10-11

## 2018-10-12 ENCOUNTER — TELEPHONE (OUTPATIENT)
Dept: FAMILY MEDICINE | Facility: CLINIC | Age: 28
End: 2018-10-12

## 2018-10-16 ENCOUNTER — TELEPHONE (OUTPATIENT)
Dept: FAMILY MEDICINE | Facility: CLINIC | Age: 28
End: 2018-10-16

## 2018-12-17 ENCOUNTER — PATIENT MESSAGE (OUTPATIENT)
Dept: FAMILY MEDICINE | Facility: CLINIC | Age: 28
End: 2018-12-17

## 2018-12-19 ENCOUNTER — PATIENT MESSAGE (OUTPATIENT)
Dept: FAMILY MEDICINE | Facility: CLINIC | Age: 28
End: 2018-12-19

## 2018-12-20 NOTE — TELEPHONE ENCOUNTER
Geno, can we please follow-up with Ms. Nassar to schedule her neurology appointment.    Thanks!  DOMINICK Johansen

## 2019-01-04 ENCOUNTER — OFFICE VISIT (OUTPATIENT)
Dept: FAMILY MEDICINE | Facility: CLINIC | Age: 29
End: 2019-01-04
Payer: COMMERCIAL

## 2019-01-04 VITALS
DIASTOLIC BLOOD PRESSURE: 70 MMHG | BODY MASS INDEX: 18.78 KG/M2 | HEIGHT: 63 IN | HEART RATE: 86 BPM | SYSTOLIC BLOOD PRESSURE: 92 MMHG | TEMPERATURE: 98 F | WEIGHT: 106 LBS | OXYGEN SATURATION: 99 %

## 2019-01-04 DIAGNOSIS — J01.90 ACUTE RHINOSINUSITIS: Primary | ICD-10-CM

## 2019-01-04 PROCEDURE — 3008F PR BODY MASS INDEX (BMI) DOCUMENTED: ICD-10-PCS | Mod: CPTII,S$GLB,, | Performed by: NURSE PRACTITIONER

## 2019-01-04 PROCEDURE — 3008F BODY MASS INDEX DOCD: CPT | Mod: CPTII,S$GLB,, | Performed by: NURSE PRACTITIONER

## 2019-01-04 PROCEDURE — 3074F SYST BP LT 130 MM HG: CPT | Mod: CPTII,S$GLB,, | Performed by: NURSE PRACTITIONER

## 2019-01-04 PROCEDURE — 99214 PR OFFICE/OUTPT VISIT, EST, LEVL IV, 30-39 MIN: ICD-10-PCS | Mod: 25,S$GLB,, | Performed by: NURSE PRACTITIONER

## 2019-01-04 PROCEDURE — 99999 PR PBB SHADOW E&M-EST. PATIENT-LVL IV: ICD-10-PCS | Mod: PBBFAC,,, | Performed by: NURSE PRACTITIONER

## 2019-01-04 PROCEDURE — 99214 OFFICE O/P EST MOD 30 MIN: CPT | Mod: 25,S$GLB,, | Performed by: NURSE PRACTITIONER

## 2019-01-04 PROCEDURE — 96372 THER/PROPH/DIAG INJ SC/IM: CPT | Mod: S$GLB,,, | Performed by: NURSE PRACTITIONER

## 2019-01-04 PROCEDURE — 99999 PR PBB SHADOW E&M-EST. PATIENT-LVL IV: CPT | Mod: PBBFAC,,, | Performed by: NURSE PRACTITIONER

## 2019-01-04 PROCEDURE — 3074F PR MOST RECENT SYSTOLIC BLOOD PRESSURE < 130 MM HG: ICD-10-PCS | Mod: CPTII,S$GLB,, | Performed by: NURSE PRACTITIONER

## 2019-01-04 PROCEDURE — 3078F PR MOST RECENT DIASTOLIC BLOOD PRESSURE < 80 MM HG: ICD-10-PCS | Mod: CPTII,S$GLB,, | Performed by: NURSE PRACTITIONER

## 2019-01-04 PROCEDURE — 96372 PR INJECTION,THERAP/PROPH/DIAG2ST, IM OR SUBCUT: ICD-10-PCS | Mod: S$GLB,,, | Performed by: NURSE PRACTITIONER

## 2019-01-04 PROCEDURE — 3078F DIAST BP <80 MM HG: CPT | Mod: CPTII,S$GLB,, | Performed by: NURSE PRACTITIONER

## 2019-01-04 RX ORDER — IPRATROPIUM BROMIDE 21 UG/1
2 SPRAY, METERED NASAL 3 TIMES DAILY
Qty: 30 ML | Refills: 0 | Status: SHIPPED | OUTPATIENT
Start: 2019-01-04 | End: 2019-01-14

## 2019-01-04 RX ORDER — METHYLPREDNISOLONE 4 MG/1
TABLET ORAL
Qty: 1 PACKAGE | Refills: 0 | Status: SHIPPED | OUTPATIENT
Start: 2019-01-04 | End: 2019-01-25

## 2019-01-04 RX ORDER — DEXAMETHASONE SODIUM PHOSPHATE 4 MG/ML
8 INJECTION, SOLUTION INTRA-ARTICULAR; INTRALESIONAL; INTRAMUSCULAR; INTRAVENOUS; SOFT TISSUE
Status: COMPLETED | OUTPATIENT
Start: 2019-01-04 | End: 2019-01-04

## 2019-01-04 RX ADMIN — DEXAMETHASONE SODIUM PHOSPHATE 8 MG: 4 INJECTION, SOLUTION INTRA-ARTICULAR; INTRALESIONAL; INTRAMUSCULAR; INTRAVENOUS; SOFT TISSUE at 12:01

## 2019-01-04 NOTE — PROGRESS NOTES
History of Present Illness   Earlene Nassar is a 28 y.o. woman with medical history as listed below who presents today for evaluation of sinusitis symptoms X10 days. She reports nasal congestion, post nasal drip, sore throat, and minimally productive cough. She has low grade fevers. She has tried multiple OTC cough/cold medications with moderate relief. She has no additional complaints and is otherwise healthy on today's visit.      History reviewed. No pertinent past medical history.    Past Surgical History:   Procedure Laterality Date    ADENOIDECTOMY      TONSILLECTOMY         Social History     Socioeconomic History    Marital status: Single     Spouse name: None    Number of children: None    Years of education: None    Highest education level: None   Social Needs    Financial resource strain: None    Food insecurity - worry: None    Food insecurity - inability: None    Transportation needs - medical: None    Transportation needs - non-medical: None   Occupational History    None   Tobacco Use    Smoking status: Never Smoker    Smokeless tobacco: Never Used   Substance and Sexual Activity    Alcohol use: Yes     Comment: occasionally    Drug use: No    Sexual activity: Yes     Partners: Male     Birth control/protection: None   Other Topics Concern    None   Social History Narrative    None       Family History   Problem Relation Age of Onset    No Known Problems Mother     Heart disease Father     No Known Problems Maternal Grandmother     No Known Problems Maternal Grandfather     No Known Problems Paternal Grandmother     No Known Problems Paternal Grandfather     Breast cancer Neg Hx     Colon cancer Neg Hx     Ovarian cancer Neg Hx     Cervical cancer Neg Hx     Endometrial cancer Neg Hx     Vaginal cancer Neg Hx        Review of Systems  Review of Systems   Constitutional: Positive for malaise/fatigue. Negative for chills and fever.   HENT: Positive for congestion, ear  "pain and sore throat. Negative for ear discharge and sinus pain.    Eyes: Negative for discharge and redness.   Respiratory: Positive for cough. Negative for sputum production, shortness of breath and wheezing.    Cardiovascular: Negative for chest pain.   Gastrointestinal: Negative for nausea and vomiting.   Neurological: Positive for headaches.     A complete review of systems was otherwise negative.    Physical Exam  BP 92/70 (BP Location: Right arm, Patient Position: Sitting, BP Method: Small (Manual))   Pulse 86   Temp 98.3 °F (36.8 °C) (Oral)   Ht 5' 3" (1.6 m)   Wt 48.1 kg (106 lb)   SpO2 99%   BMI 18.78 kg/m²   General appearance: alert, appears stated age, cooperative and no distress  Eyes: negative findings: lids and lashes normal and conjunctivae and sclerae normal  Ears: normal TM's and external ear canals both ears  Nose: clear discharge, moderate congestion, turbinates red, swollen, sinus tenderness bilateral  Throat: lips, mucosa, and tongue normal; teeth and gums normal and moderate oropharyngeal erythema with clear post nasal drainage  Lungs: clear to auscultation bilaterally  Heart: regular rate and rhythm, S1, S2 normal, no murmur, click, rub or gallop  Lymph nodes: Cervical, supraclavicular, and axillary nodes normal.  Neurologic: Grossly normal    Assessment/Plan  Acute rhinosinusitis  Viral, antibiotics not indicated.  Atrovent nasal spray TID.  Decadron IM in clinic, start the medrol dose pack tomorrow and take as directed.  Continue the Mucinex.  Rest and stay hydrated.  RTC PRN.  -     methylPREDNISolone (MEDROL DOSEPACK) 4 mg tablet; use as directed  Dispense: 1 Package; Refill: 0  -     ipratropium (ATROVENT) 0.03 % nasal spray; 2 sprays by Nasal route 3 (three) times daily. for 10 days  Dispense: 30 mL; Refill: 0  -     dexamethasone injection 8 mg    Patient has verbalized understanding and is in agreement with plan of care.    Follow-up if symptoms worsen or fail to improve.  "

## 2019-01-04 NOTE — PATIENT INSTRUCTIONS

## 2019-01-12 ENCOUNTER — PATIENT MESSAGE (OUTPATIENT)
Dept: FAMILY MEDICINE | Facility: CLINIC | Age: 29
End: 2019-01-12

## 2019-01-23 ENCOUNTER — PATIENT MESSAGE (OUTPATIENT)
Dept: FAMILY MEDICINE | Facility: CLINIC | Age: 29
End: 2019-01-23

## 2019-03-07 NOTE — PATIENT INSTRUCTIONS
1. 4th degree laceration w/fecal incontinence   - laceration healed well; still some pain at incision site  - stop colace 100mg BID; take if needed  - continue fiber supplements  - continue PT with Lauren Shepley -- start deep massage of tender area at vaginal opening 6 o'clock; use lidocaine gel (dime-sized amount) with finger on area just before PT, during PT, and after PT if needed.  Do not use more that 3x/day.    - continue estrogen cream on perineum and just inside vagina every night  - start triad cream : small amount with finger along perineum and just inside vagina, rosario at area that's tender    2.   Irregular menstrual bleeding with lightheadedness:  - check CBC  - consider continuous OCPs    3.   RTC 1 month    Bcc Infiltrative Histology Text: There were numerous aggregates of basaloid cells demonstrating an infiltrative pattern.

## 2019-03-27 ENCOUNTER — PATIENT MESSAGE (OUTPATIENT)
Dept: OBSTETRICS AND GYNECOLOGY | Facility: CLINIC | Age: 29
End: 2019-03-27

## 2019-03-27 DIAGNOSIS — R10.2 PELVIC PAIN: Primary | ICD-10-CM

## 2019-03-27 NOTE — TELEPHONE ENCOUNTER
Spoke with pt. Who has set up appt. To see NP Corey on Friday 03/29/2019 , I  added a Pelvic U/s for pt.  As well For pelvic pain,  [facility].

## 2019-04-02 ENCOUNTER — OFFICE VISIT (OUTPATIENT)
Dept: OBSTETRICS AND GYNECOLOGY | Facility: CLINIC | Age: 29
End: 2019-04-02
Payer: COMMERCIAL

## 2019-04-02 VITALS
HEIGHT: 63 IN | DIASTOLIC BLOOD PRESSURE: 68 MMHG | SYSTOLIC BLOOD PRESSURE: 110 MMHG | WEIGHT: 103 LBS | BODY MASS INDEX: 18.25 KG/M2

## 2019-04-02 DIAGNOSIS — R19.7 DIARRHEA, UNSPECIFIED TYPE: ICD-10-CM

## 2019-04-02 DIAGNOSIS — R10.31 RLQ ABDOMINAL PAIN: Primary | ICD-10-CM

## 2019-04-02 PROCEDURE — 99999 PR PBB SHADOW E&M-EST. PATIENT-LVL III: CPT | Mod: PBBFAC,,, | Performed by: NURSE PRACTITIONER

## 2019-04-02 PROCEDURE — 87220 POCT KOH: ICD-10-PCS | Mod: QW,S$GLB,, | Performed by: NURSE PRACTITIONER

## 2019-04-02 PROCEDURE — 99213 OFFICE O/P EST LOW 20 MIN: CPT | Mod: S$GLB,,, | Performed by: NURSE PRACTITIONER

## 2019-04-02 PROCEDURE — 3008F PR BODY MASS INDEX (BMI) DOCUMENTED: ICD-10-PCS | Mod: CPTII,S$GLB,, | Performed by: NURSE PRACTITIONER

## 2019-04-02 PROCEDURE — 3074F SYST BP LT 130 MM HG: CPT | Mod: CPTII,S$GLB,, | Performed by: NURSE PRACTITIONER

## 2019-04-02 PROCEDURE — 3074F PR MOST RECENT SYSTOLIC BLOOD PRESSURE < 130 MM HG: ICD-10-PCS | Mod: CPTII,S$GLB,, | Performed by: NURSE PRACTITIONER

## 2019-04-02 PROCEDURE — 3008F BODY MASS INDEX DOCD: CPT | Mod: CPTII,S$GLB,, | Performed by: NURSE PRACTITIONER

## 2019-04-02 PROCEDURE — 87220 TISSUE EXAM FOR FUNGI: CPT | Mod: QW,S$GLB,, | Performed by: NURSE PRACTITIONER

## 2019-04-02 PROCEDURE — 87210 SMEAR WET MOUNT SALINE/INK: CPT | Mod: QW,S$GLB,, | Performed by: NURSE PRACTITIONER

## 2019-04-02 PROCEDURE — 87210 POCT WET PREP: ICD-10-PCS | Mod: QW,S$GLB,, | Performed by: NURSE PRACTITIONER

## 2019-04-02 PROCEDURE — 3078F DIAST BP <80 MM HG: CPT | Mod: CPTII,S$GLB,, | Performed by: NURSE PRACTITIONER

## 2019-04-02 PROCEDURE — 99999 PR PBB SHADOW E&M-EST. PATIENT-LVL III: ICD-10-PCS | Mod: PBBFAC,,, | Performed by: NURSE PRACTITIONER

## 2019-04-02 PROCEDURE — 99213 PR OFFICE/OUTPT VISIT, EST, LEVL III, 20-29 MIN: ICD-10-PCS | Mod: S$GLB,,, | Performed by: NURSE PRACTITIONER

## 2019-04-02 PROCEDURE — 3078F PR MOST RECENT DIASTOLIC BLOOD PRESSURE < 80 MM HG: ICD-10-PCS | Mod: CPTII,S$GLB,, | Performed by: NURSE PRACTITIONER

## 2019-04-06 NOTE — PROGRESS NOTES
"Chief Complaint:    Chief Complaint   Patient presents with    Gynecologic Exam     u/s 1st / pelvic pain /        (Dr. Sky patient)    Last Pap:   5/10/2017  Normal,  HPV n/a    HPI:     Earlene Nassar is a 28 y.o. female  presents for complaints of right lower quadrant abdominal pain that occurred about 1 week ago, 1 day after the last day of her menstrual cycle.  She states the pain was intense and woke her from sleeping in the middle of the night, and reports at that time she also felt a small bump or swelling with the pain was coming in the RLQ abdomen.  She described pain as cramping, and horrible.  She states this pain lasted approximately an hour, during which she was sweating and pale.  She also reports diarrhea/loose stools at least once or twice over past week.  Pain has improved significantly since then, but she reports still feeling "pelvic pressure to RLQ abdomen and a slight sense of heaviness"; denies vag d/c or odor; denies urinary s/s.  Denies nausea, vomiting, constipation or change in appetite.    Patient states she is sexually active; denies STD concerns.    Pelvic U/S done in our office today prior to her physical exam.    LMP: Patient's last menstrual period was 2019.    History reviewed. No pertinent past medical history.    Past Surgical History:   Procedure Laterality Date    ADENOIDECTOMY      TONSILLECTOMY         OB History    Para Term  AB Living   1 1 1     1   SAB TAB Ectopic Multiple Live Births         0 1      # Outcome Date GA Lbr Andrew/2nd Weight Sex Delivery Anes PTL Lv   1 Term 17 39w0d  3.402 kg (7 lb 8 oz) M Vag-Spont EPI N JJ      Birth Comments: 3rd degree laceration repair, was in pelvic floor PT for weakness      Obstetric Comments   Menarche ~14       Family History   Problem Relation Age of Onset    No Known Problems Mother     Heart disease Father     No Known Problems Maternal Grandmother     No Known Problems Maternal " "Grandfather     No Known Problems Paternal Grandmother     No Known Problems Paternal Grandfather     Breast cancer Neg Hx     Colon cancer Neg Hx     Ovarian cancer Neg Hx     Cervical cancer Neg Hx     Endometrial cancer Neg Hx     Vaginal cancer Neg Hx        Social History     Tobacco Use    Smoking status: Never Smoker    Smokeless tobacco: Never Used   Substance Use Topics    Alcohol use: Yes     Comment: occasionally    Drug use: No       ROS:     Review of Systems   Constitutional: Negative for chills, diaphoresis, fatigue, fever and unexpected weight change.   Respiratory: Negative for shortness of breath.    Cardiovascular: Negative for chest pain.   Gastrointestinal: Positive for abdominal pain (RLQ pressure/heaviness sensation) and diarrhea (once or twice in past week). Negative for abdominal distention, anal bleeding, blood in stool, constipation, nausea and rectal pain.   Endocrine: Negative for cold intolerance and heat intolerance.   Genitourinary: Negative for difficulty urinating, dyspareunia, dysuria, flank pain, frequency, hematuria, menstrual problem, pelvic pain, urgency, vaginal discharge and vaginal pain.        Breast-Normal   Skin: Negative for color change and rash.   Neurological: Negative for headaches.   Hematological: Negative.    Psychiatric/Behavioral: Negative.        Physical Exam:   VITALS:  /68   Ht 5' 3" (1.6 m)   Wt 46.7 kg (103 lb)   LMP 03/21/2019   BMI 18.25 kg/m²     Physical Exam:   Constitutional: She is oriented to person, place, and time. She appears well-developed and well-nourished.        Pulmonary/Chest: Effort normal.        Abdominal: Soft. Normal appearance and bowel sounds are normal. She exhibits no distension and no mass. There is tenderness (mild upon moderate palpation) in the right lower quadrant. There is no rigidity, no rebound, no guarding and no CVA tenderness. No hernia.     Genitourinary: Rectum normal. Rectal exam shows no " "external hemorrhoid. Pelvic exam was performed with patient supine. There is no rash, tenderness, lesion or injury on the right labia. There is no rash, tenderness, lesion or injury on the left labia. Uterus is not enlarged and not tender. Right adnexum displays tenderness (mild). Right adnexum displays no mass and no fullness. Left adnexum displays no mass, no tenderness and no fullness. No erythema, tenderness or bleeding in the vagina. No foreign body in the vagina. No signs of injury around the vagina. Vaginal discharge (scant amount mucus/yellow discharge - KOH/WetPrep collected) found. Labial bartholins normal.Cervix exhibits discharge. Cervix exhibits no motion tenderness and no friability. Also,  no recent pap smear                Neurological: She is alert and oriented to person, place, and time.    Skin: Skin is warm and dry.    Psychiatric: She has a normal mood and affect. Her behavior is normal.       Results:      BHUPINDER/Wet Prep results:  BV:   neg,  Candida:  neg,  Trichomonas:   neg       (See full results under LABS in Epic)    Assessment/Plan:     RLQ abdominal pain    Diarrhea, unspecified type    * Reviewed u/s findings from today with patient:        "The uterus is normal in size measuring 7.4 cm in length by 3.1 x 3.9 cm in transverse dimension.  The endometrium measures 8 mm in thickness.  Right ovary normal in size measuring 2.6 by 3.9 x 1.9 cm with a few cystic follicles.  Left ovary normal in size measuring 2.9 by 1.9 by 2.3 cm with a few cystic follicles.  There is arterial and venous flow identified in the ovaries bilaterally.  No adnexal mass or free fluid in the pelvis.  Normal size uterus and ovaries without focal lesion."     ** D/W patient that I am unsure if s/s correlate with few cystic follicles noted to bilateral ovaries.  Due to the location of her pain, as well as diarrhea, I advised pt to follow-up with her PCP or GI physician for further evaluation.  Patient does NOT APPEAR TO " HAVE an acute abdomen.  However, I gave patient detailed acute pelvic pain precautions in the even her symptoms persist or worsen - advised her to go to ER in this event.       Patient was counseled today on A.C.S. Pap guidelines and recommendations for yearly pelvic exams, mammograms and monthly self breast exams; to see her PCP for other health maintenance.     Follow up if symptoms worsen or fail to improve.      * Patient aware that she will be notified once her finalized results have been reviewed by her Provider, either via her [facility] patient portal, or via telephone call.

## 2019-04-30 LAB
BACTERIA HYPHAE, POC: NEGATIVE
GARDNERELLA VAGINALIS: NEGATIVE
OTHER MICROSC. OBSERVATIONS: NORMAL
POC BACTERIAL VAGINOSIS: NEGATIVE
POC CLUE CELLS: NEGATIVE
TRICHOMONAS, POC: NEGATIVE
YEAST WET PREP: NEGATIVE
YEAST, POC: NEGATIVE

## 2019-05-30 ENCOUNTER — OFFICE VISIT (OUTPATIENT)
Dept: FAMILY MEDICINE | Facility: CLINIC | Age: 29
End: 2019-05-30
Payer: COMMERCIAL

## 2019-05-30 VITALS
SYSTOLIC BLOOD PRESSURE: 100 MMHG | TEMPERATURE: 98 F | BODY MASS INDEX: 5.91 KG/M2 | WEIGHT: 33.38 LBS | HEIGHT: 63 IN | HEART RATE: 71 BPM | DIASTOLIC BLOOD PRESSURE: 75 MMHG | OXYGEN SATURATION: 97 %

## 2019-05-30 DIAGNOSIS — J30.1 SEASONAL ALLERGIC RHINITIS DUE TO POLLEN: ICD-10-CM

## 2019-05-30 DIAGNOSIS — Z00.00 ROUTINE PHYSICAL EXAMINATION: Primary | ICD-10-CM

## 2019-05-30 DIAGNOSIS — F41.1 GENERALIZED ANXIETY DISORDER: ICD-10-CM

## 2019-05-30 PROCEDURE — 99395 PREV VISIT EST AGE 18-39: CPT | Mod: S$GLB,,, | Performed by: NURSE PRACTITIONER

## 2019-05-30 PROCEDURE — 3074F PR MOST RECENT SYSTOLIC BLOOD PRESSURE < 130 MM HG: ICD-10-PCS | Mod: CPTII,S$GLB,, | Performed by: NURSE PRACTITIONER

## 2019-05-30 PROCEDURE — 99999 PR PBB SHADOW E&M-EST. PATIENT-LVL III: CPT | Mod: PBBFAC,,, | Performed by: NURSE PRACTITIONER

## 2019-05-30 PROCEDURE — 99395 PR PREVENTIVE VISIT,EST,18-39: ICD-10-PCS | Mod: S$GLB,,, | Performed by: NURSE PRACTITIONER

## 2019-05-30 PROCEDURE — 3078F PR MOST RECENT DIASTOLIC BLOOD PRESSURE < 80 MM HG: ICD-10-PCS | Mod: CPTII,S$GLB,, | Performed by: NURSE PRACTITIONER

## 2019-05-30 PROCEDURE — 3078F DIAST BP <80 MM HG: CPT | Mod: CPTII,S$GLB,, | Performed by: NURSE PRACTITIONER

## 2019-05-30 PROCEDURE — 99999 PR PBB SHADOW E&M-EST. PATIENT-LVL III: ICD-10-PCS | Mod: PBBFAC,,, | Performed by: NURSE PRACTITIONER

## 2019-05-30 PROCEDURE — 3008F PR BODY MASS INDEX (BMI) DOCUMENTED: ICD-10-PCS | Mod: CPTII,S$GLB,, | Performed by: NURSE PRACTITIONER

## 2019-05-30 PROCEDURE — 3074F SYST BP LT 130 MM HG: CPT | Mod: CPTII,S$GLB,, | Performed by: NURSE PRACTITIONER

## 2019-05-30 PROCEDURE — 3008F BODY MASS INDEX DOCD: CPT | Mod: CPTII,S$GLB,, | Performed by: NURSE PRACTITIONER

## 2019-05-30 RX ORDER — LEVOCETIRIZINE DIHYDROCHLORIDE 5 MG/1
5 TABLET, FILM COATED ORAL NIGHTLY
Qty: 30 TABLET | Refills: 11 | Status: SHIPPED | OUTPATIENT
Start: 2019-05-30 | End: 2021-01-22

## 2019-05-30 NOTE — PROGRESS NOTES
History of Present Illness   Earlene Nassar is a 29 y.o. woman with medical history as listed below who presents today for routine physical. It has been one year since her last physical. She continues to have anxiety with panic attacks. She takes 0.25 mg Xanax for this, which she has only taken about ten times in the past year. She has tried Zoloft and Lexapro in the past but did not tolerate the side effects. She continues to have chronic allergy symptoms, but she has been followed by ENT. She is monitoring her diet and trying to exercise regularly. She has no additional complaints and is otherwise healthy on today's visit.    History reviewed. No pertinent past medical history.    Past Surgical History:   Procedure Laterality Date    ADENOIDECTOMY      TONSILLECTOMY         Social History     Socioeconomic History    Marital status:      Spouse name: Not on file    Number of children: Not on file    Years of education: Not on file    Highest education level: Not on file   Occupational History    Not on file   Social Needs    Financial resource strain: Not hard at all    Food insecurity:     Worry: Never true     Inability: Never true    Transportation needs:     Medical: No     Non-medical: No   Tobacco Use    Smoking status: Never Smoker    Smokeless tobacco: Never Used   Substance and Sexual Activity    Alcohol use: Yes     Frequency: 2-4 times a month     Drinks per session: 1 or 2     Binge frequency: Never     Comment: occasionally    Drug use: No    Sexual activity: Yes     Partners: Male     Birth control/protection: None   Lifestyle    Physical activity:     Days per week: 2 days     Minutes per session: 30 min    Stress: Very much   Relationships    Social connections:     Talks on phone: More than three times a week     Gets together: Twice a week     Attends Jehovah's witness service: Not on file     Active member of club or organization: No     Attends meetings of clubs or  "organizations: Never     Relationship status:    Other Topics Concern    Not on file   Social History Narrative    Not on file       Family History   Problem Relation Age of Onset    No Known Problems Mother     Heart disease Father     No Known Problems Maternal Grandmother     No Known Problems Maternal Grandfather     No Known Problems Paternal Grandmother     No Known Problems Paternal Grandfather     Breast cancer Neg Hx     Colon cancer Neg Hx     Ovarian cancer Neg Hx     Cervical cancer Neg Hx     Endometrial cancer Neg Hx     Vaginal cancer Neg Hx        Review of Systems  Review of Systems   Constitutional: Negative for malaise/fatigue and weight loss.   HENT: Positive for congestion and ear pain. Negative for hearing loss, sinus pain and sore throat.    Eyes: Negative for blurred vision and double vision.   Respiratory: Negative for shortness of breath and wheezing.    Cardiovascular: Negative for chest pain, palpitations, orthopnea and leg swelling.   Gastrointestinal: Negative for blood in stool, heartburn and melena.   Genitourinary: Negative for flank pain and hematuria.   Musculoskeletal: Negative for back pain, joint pain and neck pain.   Neurological: Negative for dizziness, loss of consciousness and headaches.   Endo/Heme/Allergies: Negative for polydipsia.   Psychiatric/Behavioral: Negative for depression. The patient is nervous/anxious.      A complete review of systems was otherwise negative.    Physical Exam  /75   Pulse 71   Temp 97.8 °F (36.6 °C) (Oral)   Ht 5' 3" (1.6 m)   Wt 15.2 kg (33 lb 6.4 oz)   LMP 05/18/2019   SpO2 97%   BMI 5.92 kg/m²   General appearance: alert, appears stated age, cooperative and no distress  Eyes: negative findings: lids and lashes normal and conjunctivae and sclerae normal  Ears: normal TM's and external ear canals both ears and bilateral middle ear effusion  Nose: Nares normal. Septum midline. Mucosa normal. No drainage or " sinus tenderness.  Throat: lips, mucosa, and tongue normal; teeth and gums normal  Neck: no JVD, supple, symmetrical, trachea midline and thyroid not enlarged, symmetric, no tenderness/mass/nodules  Back: symmetric, no curvature. ROM normal. No CVA tenderness.  Lungs: clear to auscultation bilaterally  Heart: regular rate and rhythm, S1, S2 normal, no murmur, click, rub or gallop  Abdomen: soft, non-tender; bowel sounds normal; no masses,  no organomegaly  Extremities: extremities normal, atraumatic, no cyanosis or edema  Pulses: 2+ and symmetric  Skin: Skin color, texture, turgor normal. No rashes or lesions  Lymph nodes: Cervical, supraclavicular, and axillary nodes normal.  Neurologic: Grossly normal    Assessment/Plan  Routine physical examination  We discussed age appropriate healthcare maintenance and screening recommendations.  We discussed heart healthy diet and exercise.   Routine labs as listed below.  Follow-up with OBGYN as scheduled for routine exam.  Return in one year for routine physical or sooner as needed.  -     TSH; Future; Expected date: 05/30/2019  -     T4, free; Future; Expected date: 05/30/2019  -     CBC auto differential; Future; Expected date: 05/30/2019  -     Comprehensive metabolic panel; Future; Expected date: 05/30/2019  -     Lipid panel; Future; Expected date: 05/30/2019    Generalized anxiety disorder  We discussed the use of coping mechanisms such as meditation.  She is using Xanax very sparingly, followed by OBGYN.  We discussed use of daily medication such as SSRI, she will consider this and let me know if she would like to try starting low dose.  She denies SI or HI.    Seasonal allergic rhinitis due to pollen  The current medical regimen is effective;  continue present plan and medications.  -     levocetirizine (XYZAL) 5 MG tablet; Take 1 tablet (5 mg total) by mouth every evening.  Dispense: 30 tablet; Refill: 11    Patient has verbalized understanding and is in agreement  with plan of care.    Follow up in about 1 year (around 5/30/2020) for routine physical exam.  Answers for HPI/ROS submitted by the patient on 5/30/2019   activity change: No  unexpected weight change: No  rhinorrhea: No  trouble swallowing: No  visual disturbance: No  chest tightness: No  polyuria: No  difficulty urinating: No  menstrual problem: No  joint swelling: No  arthralgias: No  confusion: No  dysphoric mood: No

## 2019-06-07 ENCOUNTER — OFFICE VISIT (OUTPATIENT)
Dept: OBSTETRICS AND GYNECOLOGY | Facility: CLINIC | Age: 29
End: 2019-06-07
Payer: COMMERCIAL

## 2019-06-07 ENCOUNTER — LAB VISIT (OUTPATIENT)
Dept: LAB | Facility: HOSPITAL | Age: 29
End: 2019-06-07
Attending: NURSE PRACTITIONER
Payer: COMMERCIAL

## 2019-06-07 VITALS — HEIGHT: 63 IN | WEIGHT: 105.81 LBS | BODY MASS INDEX: 18.75 KG/M2

## 2019-06-07 DIAGNOSIS — Z12.4 ENCOUNTER FOR PAPANICOLAOU SMEAR FOR CERVICAL CANCER SCREENING: ICD-10-CM

## 2019-06-07 DIAGNOSIS — Z00.00 ROUTINE PHYSICAL EXAMINATION: ICD-10-CM

## 2019-06-07 DIAGNOSIS — Z01.419 ENCOUNTER FOR GYNECOLOGICAL EXAMINATION: Primary | ICD-10-CM

## 2019-06-07 LAB
ALBUMIN SERPL BCP-MCNC: 3.8 G/DL (ref 3.5–5.2)
ALP SERPL-CCNC: 43 U/L (ref 55–135)
ALT SERPL W/O P-5'-P-CCNC: 14 U/L (ref 10–44)
ANION GAP SERPL CALC-SCNC: 6 MMOL/L (ref 8–16)
AST SERPL-CCNC: 16 U/L (ref 10–40)
BASOPHILS # BLD AUTO: 0.04 K/UL (ref 0–0.2)
BASOPHILS NFR BLD: 0.7 % (ref 0–1.9)
BILIRUB SERPL-MCNC: 0.5 MG/DL (ref 0.1–1)
BUN SERPL-MCNC: 13 MG/DL (ref 6–20)
CALCIUM SERPL-MCNC: 9.2 MG/DL (ref 8.7–10.5)
CHLORIDE SERPL-SCNC: 106 MMOL/L (ref 95–110)
CHOLEST SERPL-MCNC: 175 MG/DL (ref 120–199)
CHOLEST/HDLC SERPL: 3.6 {RATIO} (ref 2–5)
CO2 SERPL-SCNC: 25 MMOL/L (ref 23–29)
CREAT SERPL-MCNC: 0.7 MG/DL (ref 0.5–1.4)
DIFFERENTIAL METHOD: ABNORMAL
EOSINOPHIL # BLD AUTO: 0.1 K/UL (ref 0–0.5)
EOSINOPHIL NFR BLD: 1.4 % (ref 0–8)
ERYTHROCYTE [DISTWIDTH] IN BLOOD BY AUTOMATED COUNT: 12.5 % (ref 11.5–14.5)
EST. GFR  (AFRICAN AMERICAN): >60 ML/MIN/1.73 M^2
EST. GFR  (NON AFRICAN AMERICAN): >60 ML/MIN/1.73 M^2
GLUCOSE SERPL-MCNC: 89 MG/DL (ref 70–110)
HCT VFR BLD AUTO: 40.1 % (ref 37–48.5)
HDLC SERPL-MCNC: 49 MG/DL (ref 40–75)
HDLC SERPL: 28 % (ref 20–50)
HGB BLD-MCNC: 13.3 G/DL (ref 12–16)
IMM GRANULOCYTES # BLD AUTO: 0.01 K/UL (ref 0–0.04)
IMM GRANULOCYTES NFR BLD AUTO: 0.2 % (ref 0–0.5)
LDLC SERPL CALC-MCNC: 114 MG/DL (ref 63–159)
LYMPHOCYTES # BLD AUTO: 3.3 K/UL (ref 1–4.8)
LYMPHOCYTES NFR BLD: 57.1 % (ref 18–48)
MCH RBC QN AUTO: 30 PG (ref 27–31)
MCHC RBC AUTO-ENTMCNC: 33.2 G/DL (ref 32–36)
MCV RBC AUTO: 91 FL (ref 82–98)
MONOCYTES # BLD AUTO: 0.6 K/UL (ref 0.3–1)
MONOCYTES NFR BLD: 10.8 % (ref 4–15)
NEUTROPHILS # BLD AUTO: 1.7 K/UL (ref 1.8–7.7)
NEUTROPHILS NFR BLD: 29.8 % (ref 38–73)
NONHDLC SERPL-MCNC: 126 MG/DL
NRBC BLD-RTO: 0 /100 WBC
PLATELET # BLD AUTO: 253 K/UL (ref 150–350)
PMV BLD AUTO: 10.4 FL (ref 9.2–12.9)
POTASSIUM SERPL-SCNC: 3.9 MMOL/L (ref 3.5–5.1)
PROT SERPL-MCNC: 7 G/DL (ref 6–8.4)
RBC # BLD AUTO: 4.43 M/UL (ref 4–5.4)
SODIUM SERPL-SCNC: 137 MMOL/L (ref 136–145)
T4 FREE SERPL-MCNC: 0.96 NG/DL (ref 0.71–1.51)
TRIGL SERPL-MCNC: 60 MG/DL (ref 30–150)
TSH SERPL DL<=0.005 MIU/L-ACNC: 1.35 UIU/ML (ref 0.4–4)
WBC # BLD AUTO: 5.73 K/UL (ref 3.9–12.7)

## 2019-06-07 PROCEDURE — 84443 ASSAY THYROID STIM HORMONE: CPT

## 2019-06-07 PROCEDURE — 80053 COMPREHEN METABOLIC PANEL: CPT

## 2019-06-07 PROCEDURE — 88175 CYTOPATH C/V AUTO FLUID REDO: CPT

## 2019-06-07 PROCEDURE — 99395 PREV VISIT EST AGE 18-39: CPT | Mod: S$GLB,,, | Performed by: OBSTETRICS & GYNECOLOGY

## 2019-06-07 PROCEDURE — 99999 PR PBB SHADOW E&M-EST. PATIENT-LVL III: CPT | Mod: PBBFAC,,, | Performed by: OBSTETRICS & GYNECOLOGY

## 2019-06-07 PROCEDURE — 36415 COLL VENOUS BLD VENIPUNCTURE: CPT | Mod: PO

## 2019-06-07 PROCEDURE — 85025 COMPLETE CBC W/AUTO DIFF WBC: CPT

## 2019-06-07 PROCEDURE — 99999 PR PBB SHADOW E&M-EST. PATIENT-LVL III: ICD-10-PCS | Mod: PBBFAC,,, | Performed by: OBSTETRICS & GYNECOLOGY

## 2019-06-07 PROCEDURE — 99395 PR PREVENTIVE VISIT,EST,18-39: ICD-10-PCS | Mod: S$GLB,,, | Performed by: OBSTETRICS & GYNECOLOGY

## 2019-06-07 PROCEDURE — 84439 ASSAY OF FREE THYROXINE: CPT

## 2019-06-07 PROCEDURE — 80061 LIPID PANEL: CPT

## 2019-06-07 RX ORDER — ALPRAZOLAM 0.5 MG/1
0.5 TABLET ORAL 3 TIMES DAILY PRN
Qty: 30 TABLET | Refills: 0 | Status: CANCELLED | OUTPATIENT
Start: 2019-06-07 | End: 2020-06-06

## 2019-06-07 NOTE — PROGRESS NOTES
"CC: Well woman exam    Earlene Nassar is a 29 y.o. female  presents for a well woman exam.  ocps were giving her headaches so stopped. Using condoms or coitus interruptus.  No c/o.     Past Medical History:   Diagnosis Date    Anxiety     Xanax prn    Asthma due to environmental allergies     Xyzal       Past Surgical History:   Procedure Laterality Date    ADENOIDECTOMY      TONSILLECTOMY         OB History    Para Term  AB Living   1 1 1     1   SAB TAB Ectopic Multiple Live Births         0 1      # Outcome Date GA Lbr Andrew/2nd Weight Sex Delivery Anes PTL Lv   1 Term 17 39w0d  3.402 kg (7 lb 8 oz) M Vag-Spont EPI N JJ      Birth Comments: 3rd degree laceration repair, was in pelvic floor PT for weakness      Obstetric Comments   Menarche ~14       Family History   Problem Relation Age of Onset    No Known Problems Mother     Heart disease Father     No Known Problems Maternal Grandmother     No Known Problems Maternal Grandfather     No Known Problems Paternal Grandmother     No Known Problems Paternal Grandfather     No Known Problems Sister     Breast cancer Neg Hx     Colon cancer Neg Hx     Ovarian cancer Neg Hx     Cervical cancer Neg Hx     Endometrial cancer Neg Hx     Vaginal cancer Neg Hx        Social History     Tobacco Use    Smoking status: Never Smoker    Smokeless tobacco: Never Used   Substance Use Topics    Alcohol use: Yes     Frequency: 2-4 times a month     Drinks per session: 1 or 2     Binge frequency: Never     Comment: occasionally    Drug use: No       Ht 5' 3" (1.6 m)   Wt 48 kg (105 lb 13.1 oz)   LMP 2019   BMI 18.75 kg/m²     ROS:  GENERAL: Denies weight gain or weight loss. Feeling well overall.   SKIN: Denies rash or lesions.   HEAD: Denies head injury or headache.   NODES: Denies enlarged lymph nodes.   CHEST: Denies chest pain or shortness of breath.   CARDIOVASCULAR: Denies palpitations or left sided chest pain. "   ABDOMEN: No abdominal pain, constipation, diarrhea, nausea, vomiting or rectal bleeding.   URINARY: No frequency, dysuria, hematuria, or burning on urination.  REPRODUCTIVE: See HPI.   BREASTS: The patient performs breast self-examination and denies pain, lumps, or nipple discharge.   HEMATOLOGIC: No easy bruisability or excessive bleeding.  MUSCULOSKELETAL: Denies joint pain or swelling.   NEUROLOGIC: Denies syncope or weakness.   PSYCHIATRIC: Denies depression, anxiety or mood swings.    Physical Exam:    APPEARANCE: Well nourished, well developed, in no acute distress.  AFFECT: WNL, alert and oriented x 3  SKIN: No acne or hirsutism  NECK: Neck symmetric without masses or thyromegaly  NODES: No inguinal, cervical, axillary, or femoral lymph node enlargement  CHEST: Good respiratory effect  ABDOMEN: Soft.  No tenderness or masses.  No hepatosplenomegaly.  No hernias.  BREASTS: Symmetrical, no skin changes or visible lesions.  No palpable masses, nipple discharge bilaterally.  PELVIC: Normal external genitalia without lesions.  Normal hair distribution.  Adequate perineal body, normal urethral meatus.  Vagina moist and well rugated without lesions or discharge.  Cervix pink, without lesions, discharge or tenderness.  No significant cystocele or rectocele.  Bimanual exam shows uterus to be normal size, regular, mobile and nontender.  Adnexa without masses or tenderness.    EXTREMITIES: No edema.    ASSESSMENT AND PLAN  1. Encounter for gynecological examination     2. Encounter for Papanicolaou smear for cervical cancer screening  Liquid-based pap smear, screening       Patient was counseled today on A.C.S. Pap guidelines and recommendations for yearly pelvic exams, mammograms and monthly self breast exams; to see her PCP for other health maintenance.     Follow up in about 1 year (around 6/7/2020).

## 2019-06-11 ENCOUNTER — PATIENT MESSAGE (OUTPATIENT)
Dept: FAMILY MEDICINE | Facility: CLINIC | Age: 29
End: 2019-06-11

## 2019-06-21 ENCOUNTER — PATIENT MESSAGE (OUTPATIENT)
Dept: FAMILY MEDICINE | Facility: CLINIC | Age: 29
End: 2019-06-21

## 2019-08-29 ENCOUNTER — PATIENT MESSAGE (OUTPATIENT)
Dept: OBSTETRICS AND GYNECOLOGY | Facility: CLINIC | Age: 29
End: 2019-08-29

## 2019-08-29 RX ORDER — ALPRAZOLAM 0.5 MG/1
0.5 TABLET ORAL 2 TIMES DAILY PRN
Qty: 30 TABLET | Refills: 0 | Status: SHIPPED | OUTPATIENT
Start: 2019-08-29 | End: 2021-01-22

## 2019-08-29 NOTE — TELEPHONE ENCOUNTER
Earlene Nassar Sacha J., MD 2 minutes ago (11:16 AM)         Hey! When I came in for my visit in May you mentioned refilling my Xanax prescription. I forgot to follow up with you and my prescription benefit renews ok September 1 so I was hoping to refill it before then to avoid having to pay my $100 copay.     Thanks!   Earlene      30 day supply of xanax 0.5 pended

## 2019-11-06 ENCOUNTER — OFFICE VISIT (OUTPATIENT)
Dept: FAMILY MEDICINE | Facility: CLINIC | Age: 29
End: 2019-11-06
Payer: COMMERCIAL

## 2019-11-06 VITALS
TEMPERATURE: 99 F | BODY MASS INDEX: 19.75 KG/M2 | OXYGEN SATURATION: 97 % | DIASTOLIC BLOOD PRESSURE: 78 MMHG | HEART RATE: 80 BPM | SYSTOLIC BLOOD PRESSURE: 123 MMHG | WEIGHT: 111.44 LBS | HEIGHT: 63 IN

## 2019-11-06 DIAGNOSIS — H66.004 RECURRENT ACUTE SUPPURATIVE OTITIS MEDIA OF RIGHT EAR WITHOUT SPONTANEOUS RUPTURE OF TYMPANIC MEMBRANE: Primary | ICD-10-CM

## 2019-11-06 PROCEDURE — 99214 PR OFFICE/OUTPT VISIT, EST, LEVL IV, 30-39 MIN: ICD-10-PCS | Mod: S$GLB,,, | Performed by: NURSE PRACTITIONER

## 2019-11-06 PROCEDURE — 3074F PR MOST RECENT SYSTOLIC BLOOD PRESSURE < 130 MM HG: ICD-10-PCS | Mod: CPTII,S$GLB,, | Performed by: NURSE PRACTITIONER

## 2019-11-06 PROCEDURE — 3008F PR BODY MASS INDEX (BMI) DOCUMENTED: ICD-10-PCS | Mod: CPTII,S$GLB,, | Performed by: NURSE PRACTITIONER

## 2019-11-06 PROCEDURE — 99999 PR PBB SHADOW E&M-EST. PATIENT-LVL IV: CPT | Mod: PBBFAC,,, | Performed by: NURSE PRACTITIONER

## 2019-11-06 PROCEDURE — 3078F PR MOST RECENT DIASTOLIC BLOOD PRESSURE < 80 MM HG: ICD-10-PCS | Mod: CPTII,S$GLB,, | Performed by: NURSE PRACTITIONER

## 2019-11-06 PROCEDURE — 99999 PR PBB SHADOW E&M-EST. PATIENT-LVL IV: ICD-10-PCS | Mod: PBBFAC,,, | Performed by: NURSE PRACTITIONER

## 2019-11-06 PROCEDURE — 3078F DIAST BP <80 MM HG: CPT | Mod: CPTII,S$GLB,, | Performed by: NURSE PRACTITIONER

## 2019-11-06 PROCEDURE — 3074F SYST BP LT 130 MM HG: CPT | Mod: CPTII,S$GLB,, | Performed by: NURSE PRACTITIONER

## 2019-11-06 PROCEDURE — 99214 OFFICE O/P EST MOD 30 MIN: CPT | Mod: S$GLB,,, | Performed by: NURSE PRACTITIONER

## 2019-11-06 PROCEDURE — 3008F BODY MASS INDEX DOCD: CPT | Mod: CPTII,S$GLB,, | Performed by: NURSE PRACTITIONER

## 2019-11-06 RX ORDER — AMOXICILLIN AND CLAVULANATE POTASSIUM 875; 125 MG/1; MG/1
1 TABLET, FILM COATED ORAL EVERY 12 HOURS
Qty: 20 TABLET | Refills: 0 | Status: SHIPPED | OUTPATIENT
Start: 2019-11-06 | End: 2019-11-16

## 2019-11-06 NOTE — MEDICAL/APP STUDENT
"27-Subjective:       Patient ID: Earlene Nassar is a 29 y.o. female.    Chief Complaint: Otalgia    29-year-old female presents today w/ c/o bilateral ear pain (R>L) x 1 day. Reports hx recurrent ear infections. States ears feel "full" and has taken ibuprofen this morning with minimal relief. Denies fever, chills, cough, congestion, sore throat. Pt is otherwise healthy and has no other complaints on today's visit.    Review of Systems   Constitutional: Negative for chills, fatigue and fever.   HENT: Positive for ear pain and tinnitus. Negative for congestion, ear discharge, postnasal drip, rhinorrhea, sinus pain and sore throat.    Respiratory: Negative for cough, chest tightness and shortness of breath.        Objective:      Physical Exam   Constitutional: She appears well-nourished. No distress.   HENT:   Head: Normocephalic.   Right Ear: External ear normal. There is tenderness. Tympanic membrane is bulging.   Left Ear: External ear normal.   Nose: Nose normal.   Mouth/Throat: Oropharynx is clear and moist.   R TM cloudiness   Pulmonary/Chest: Effort normal and breath sounds normal. No respiratory distress.       Assessment:       1. Recurrent acute suppurative otitis media of right ear without spontaneous rupture of tympanic membrane        Plan:       Recurrent acute suppurative otitis media of right ear without spontaneous rupture of tympanic membrane  -     amoxicillin-clavulanate 875-125mg (AUGMENTIN) 875-125 mg per tablet; Take 1 tablet by mouth every 12 (twelve) hours. for 10 days  Dispense: 20 tablet; Refill: 0  -     Ambulatory referral to ENT    Take augmentin x 10 days. May continue ibuprofen or Tylenol for pain.   Use an OTC decongestant such as Sudafed to promote drainage.    Follow up for new or worsening symptoms.    "

## 2019-11-06 NOTE — PATIENT INSTRUCTIONS

## 2019-11-06 NOTE — PROGRESS NOTES
History of Present Illness   Earlene Nassar is a 29 y.o. woman with medical history as listed below who presents today for evaluation of bilateral ear pain x 1 day R>L. Pain is a fullness with intermittent sharp pain. The pain is associated with mild dizziness and muffled hearing. She has tried Ibuprofen with no relief. She has no additional complaints and is otherwise healthy on today's visit.    Past Medical History:   Diagnosis Date    Anxiety     Xanax prn    Asthma due to environmental allergies     Xyzal       Past Surgical History:   Procedure Laterality Date    ADENOIDECTOMY      TONSILLECTOMY         Social History     Socioeconomic History    Marital status:      Spouse name: Not on file    Number of children: Not on file    Years of education: Not on file    Highest education level: Not on file   Occupational History    Not on file   Social Needs    Financial resource strain: Not hard at all    Food insecurity:     Worry: Never true     Inability: Never true    Transportation needs:     Medical: No     Non-medical: No   Tobacco Use    Smoking status: Never Smoker    Smokeless tobacco: Never Used   Substance and Sexual Activity    Alcohol use: Yes     Frequency: 2-4 times a month     Drinks per session: 1 or 2     Binge frequency: Never     Comment: occasionally    Drug use: No    Sexual activity: Yes     Partners: Male     Birth control/protection: None   Lifestyle    Physical activity:     Days per week: 2 days     Minutes per session: 30 min    Stress: Very much   Relationships    Social connections:     Talks on phone: More than three times a week     Gets together: Twice a week     Attends Rastafari service: Not on file     Active member of club or organization: No     Attends meetings of clubs or organizations: Never     Relationship status:    Other Topics Concern    Not on file   Social History Narrative    Not on file       Family History   Problem Relation  "Age of Onset    No Known Problems Mother     Heart disease Father     No Known Problems Maternal Grandmother     No Known Problems Maternal Grandfather     No Known Problems Paternal Grandmother     No Known Problems Paternal Grandfather     No Known Problems Sister     Breast cancer Neg Hx     Colon cancer Neg Hx     Ovarian cancer Neg Hx     Cervical cancer Neg Hx     Endometrial cancer Neg Hx     Vaginal cancer Neg Hx        Review of Systems  Review of Systems   Constitutional: Negative for chills and fever.   HENT: Positive for ear pain and tinnitus. Negative for congestion, ear discharge, sinus pain and sore throat.    Respiratory: Negative for cough.    Neurological: Negative for headaches.     A complete review of systems was otherwise negative.    Physical Exam  /78   Pulse 80   Temp 98.5 °F (36.9 °C) (Oral)   Ht 5' 3" (1.6 m)   Wt 50.5 kg (111 lb 7.1 oz)   LMP 10/21/2019   SpO2 97%   BMI 19.74 kg/m²   General appearance: alert, appears stated age, cooperative and no distress  Eyes: negative findings: lids and lashes normal and conjunctivae and sclerae normal  Ears: abnormal external canal right ear - erythematous, abnormal TM right ear - dull, bulging and purulent middle ear fluid and abnormal TM left ear - bulging  Nose: Nares normal. Septum midline. Mucosa normal. No drainage or sinus tenderness.  Throat: lips, mucosa, and tongue normal; teeth and gums normal  Lungs: clear to auscultation bilaterally  Heart: regular rate and rhythm, S1, S2 normal, no murmur, click, rub or gallop  Lymph nodes: Cervical, supraclavicular, and axillary nodes normal.  Neurologic: Grossly normal    Assessment/Plan  Recurrent acute suppurative otitis media of right ear without spontaneous rupture of tympanic membrane  Treatment as listed below- be sure to take antibiotics until complete.  May use NSAID for pain control and Sudafed OTC for middle ear effusion.  Referral to ENT given recurrent symptoms " with eustachian tube dysfunction.  RTC PRN.  -     amoxicillin-clavulanate 875-125mg (AUGMENTIN) 875-125 mg per tablet; Take 1 tablet by mouth every 12 (twelve) hours. for 10 days  Dispense: 20 tablet; Refill: 0  -     Ambulatory referral to ENT    Patient has verbalized understanding and is in agreement with plan of care.    Follow up if symptoms worsen or fail to improve.

## 2019-11-18 ENCOUNTER — OFFICE VISIT (OUTPATIENT)
Dept: OTOLARYNGOLOGY | Facility: CLINIC | Age: 29
End: 2019-11-18
Payer: COMMERCIAL

## 2019-11-18 DIAGNOSIS — Z86.69 HISTORY OF RECURRENT EAR INFECTION: ICD-10-CM

## 2019-11-18 DIAGNOSIS — Z90.89 HISTORY OF TONSILLECTOMY: ICD-10-CM

## 2019-11-18 DIAGNOSIS — H93.8X3 PRESSURE SENSATION IN BOTH EARS: Primary | ICD-10-CM

## 2019-11-18 DIAGNOSIS — Z96.22 S/P BILATERAL MYRINGOTOMY WITH TUBE PLACEMENT: ICD-10-CM

## 2019-11-18 DIAGNOSIS — H61.23 BILATERAL IMPACTED CERUMEN: ICD-10-CM

## 2019-11-18 PROCEDURE — 99999 PR PBB SHADOW E&M-EST. PATIENT-LVL II: CPT | Mod: PBBFAC,,, | Performed by: OTOLARYNGOLOGY

## 2019-11-18 PROCEDURE — 99203 PR OFFICE/OUTPT VISIT, NEW, LEVL III, 30-44 MIN: ICD-10-PCS | Mod: 25,S$GLB,, | Performed by: OTOLARYNGOLOGY

## 2019-11-18 PROCEDURE — 69210 REMOVE IMPACTED EAR WAX UNI: CPT | Mod: S$GLB,,, | Performed by: OTOLARYNGOLOGY

## 2019-11-18 PROCEDURE — 99203 OFFICE O/P NEW LOW 30 MIN: CPT | Mod: 25,S$GLB,, | Performed by: OTOLARYNGOLOGY

## 2019-11-18 PROCEDURE — 99999 PR PBB SHADOW E&M-EST. PATIENT-LVL II: ICD-10-PCS | Mod: PBBFAC,,, | Performed by: OTOLARYNGOLOGY

## 2019-11-18 PROCEDURE — 69210 PR REMOVAL IMPACTED CERUMEN REQUIRING INSTRUMENTATION, UNILATERAL: ICD-10-PCS | Mod: S$GLB,,, | Performed by: OTOLARYNGOLOGY

## 2019-11-18 NOTE — PATIENT INSTRUCTIONS
Cerumen and dog hairs removed from both eacs; restrictive use of ear buds may help  Audiometry/tympanometry on return  E.T. Literature provided  RTC prn acute ear infection ; names of otologists provided

## 2019-11-18 NOTE — LETTER
November 19, 2019      Dee Leigh, CHELSEA  [address]  [place] [address]           [facility] - Otorhinolaryngology  [address]  [place] [address]  Phone: [phone]  Fax: [phone]          Patient: Earlene Nassar   MR Number: [id number]   YOB: 1990   Date of Visit: 11/18/2019       Dear Dee Leigh:    Thank you for referring Earlene Nassar to me for evaluation. Attached you will find relevant portions of my assessment and plan of care.    If you have questions, please do not hesitate to call me. I look forward to following Earlene Nassar along with you.    Sincerely,    Casimiro Cespedes III, MD    Enclosure  CC:  No Recipients    If you would like to receive this communication electronically, please contact [email or web address] or [phone] to request more information on [employer] Link access.    For providers and/or their staff who would like to refer a patient to [facility], please contact us through our one-stop-shop provider referral line, [facility] [place], operated by [facility], at [phone].    If you feel you have received this communication in error or would no longer like to receive these types of communications, please e-mail [email or web address]

## 2019-11-19 NOTE — PROGRESS NOTES
"Subjective:       Patient ID: Earlene Nassar is a 29 y.o. female.    Chief Complaint: Ear Fullness; Dizziness; Tinnitus; Otalgia; and Otitis Media    HPI: Ms. Nassar is a 29-year-old  female whose hand written reason for the visit today is frequent ear infections/fluid .  She has been previously evaluated by another otolaryngologist 11/2 years ago for signs and symptoms of ear infections.  Audiometry was within normal limits then including tympanometry per history.  She remembers being treated with a steroid course which did help the condition then.  She believes weather changes affect her ears.  She is presently complains of and ear fullness sensation even now.    She has just completed a 10 day course of Augmentin yesterday prescribed by a nurse practitioner Reese 11/06/2019 for treatment of recurrent acute suppurative otitis media of the right ear without spontaneous rupture of the tympanic membrane.   She complained of bilateral ear pain x1 day, right side greater than left with fullness and intermittent sharp pain.  The pain was associated with mild dizziness and muffled hearing.  She was given an ambulatory referral to the ENT service for a 2nd opinion regarding her otologic status.  She indicates feeling "off" when her ears hurt.  She indicates otalgia symptoms which usually indicates oscar infection.  Her right ear bothers her more today.  She does utilize ear buds frequently.    She sleeps with her dog" Chops".    She has a young child ( 2 1/2 years old ) at home.  She did not receive a flu vaccine this fall.    PMH:  N/a  PSH:  Tonsillectomy around 1995; status post bilateral PE tube procedures x1 in childhood  Family history:  Heart disease  Allergies:  Codeine  Habits:  1 caffeinated drink per day  Occupation:  Health and   Review of Systems   Ears: Positive for ear pain, ear pressure, ringing in ear, ear infections and dizziness.    Eyes:  Positive for visual " change.   Other:  Negative for rash.     Current Outpatient Medications on File Prior to Visit   Medication Sig Dispense Refill    levocetirizine (XYZAL) 5 MG tablet Take 1 tablet (5 mg total) by mouth every evening. 30 tablet 11    ALPRAZolam (XANAX) 0.5 MG tablet Take 1 tablet (0.5 mg total) by mouth 2 (two) times daily as needed for Anxiety. (Patient not taking: Reported on 11/6/2019) 30 tablet 0     No current facility-administered medications on file prior to visit.              Objective:     Height 5 ft 3 in weight 111 lb  General:  Alert and oriented lady in no acute distress  Both ears were examined under the microscope in the micro procedure room  Physical Exam   Constitutional: She is oriented to person, place, and time. She appears well-developed and well-nourished.   HENT:   Head: Normocephalic.   Right Ear: Tympanic membrane and external ear normal. No drainage. No foreign bodies. No mastoid tenderness. Tympanic membrane is not perforated. No decreased hearing is noted.   Left Ear: Tympanic membrane and external ear normal. No drainage. No foreign bodies. No mastoid tenderness. Tympanic membrane is not perforated. No decreased hearing is noted.   Ears:    Nose: Nose normal. No nasal deformity, septal deviation or nasal septal hematoma. No epistaxis. Right sinus exhibits no maxillary sinus tenderness and no frontal sinus tenderness. Left sinus exhibits no maxillary sinus tenderness and no frontal sinus tenderness.   Mouth/Throat: Uvula is midline, oropharynx is clear and moist and mucous membranes are normal. No oral lesions. No trismus in the jaw. No uvula swelling. No oropharyngeal exudate or tonsillar abscesses.   Neck: Neck supple. No tracheal deviation present. No thyromegaly present.   Pulmonary/Chest: Effort normal. No stridor.   Lymphadenopathy:     She has no cervical adenopathy.   Neurological: She is alert and oriented to person, place, and time.   Skin: No rash noted.       Assessment:        1. Pressure sensation in both ears ( more right sided today)    2. Bilateral impacted cerumen ; removed ( with dog hair)    3. S/p bilateral myringotomy with tube placement    4. History of tonsillectomy    5. History of recurrent ear infections/fluid     6.     Ear bud use   Plan:     Cerumen and dog hairs removed from both eacs; restrictive use of ear buds may help  Audiometry/tympanometry on return  E.T. Literature provided  RTC prn acute ear infection ; names of otologists provided

## 2020-09-22 ENCOUNTER — OFFICE VISIT (OUTPATIENT)
Dept: OBSTETRICS AND GYNECOLOGY | Facility: CLINIC | Age: 30
End: 2020-09-22
Attending: OBSTETRICS & GYNECOLOGY
Payer: COMMERCIAL

## 2020-09-22 VITALS
WEIGHT: 117.81 LBS | DIASTOLIC BLOOD PRESSURE: 70 MMHG | SYSTOLIC BLOOD PRESSURE: 110 MMHG | BODY MASS INDEX: 20.88 KG/M2 | HEIGHT: 63 IN

## 2020-09-22 DIAGNOSIS — Z01.419 ENCOUNTER FOR GYNECOLOGICAL EXAMINATION: Primary | ICD-10-CM

## 2020-09-22 PROCEDURE — 3078F PR MOST RECENT DIASTOLIC BLOOD PRESSURE < 80 MM HG: ICD-10-PCS | Mod: CPTII,S$GLB,, | Performed by: OBSTETRICS & GYNECOLOGY

## 2020-09-22 PROCEDURE — 99999 PR PBB SHADOW E&M-EST. PATIENT-LVL III: ICD-10-PCS | Mod: PBBFAC,,, | Performed by: OBSTETRICS & GYNECOLOGY

## 2020-09-22 PROCEDURE — 99999 PR PBB SHADOW E&M-EST. PATIENT-LVL III: CPT | Mod: PBBFAC,,, | Performed by: OBSTETRICS & GYNECOLOGY

## 2020-09-22 PROCEDURE — 3078F DIAST BP <80 MM HG: CPT | Mod: CPTII,S$GLB,, | Performed by: OBSTETRICS & GYNECOLOGY

## 2020-09-22 PROCEDURE — 3074F PR MOST RECENT SYSTOLIC BLOOD PRESSURE < 130 MM HG: ICD-10-PCS | Mod: CPTII,S$GLB,, | Performed by: OBSTETRICS & GYNECOLOGY

## 2020-09-22 PROCEDURE — 99395 PR PREVENTIVE VISIT,EST,18-39: ICD-10-PCS | Mod: S$GLB,,, | Performed by: OBSTETRICS & GYNECOLOGY

## 2020-09-22 PROCEDURE — 3074F SYST BP LT 130 MM HG: CPT | Mod: CPTII,S$GLB,, | Performed by: OBSTETRICS & GYNECOLOGY

## 2020-09-22 PROCEDURE — 99395 PREV VISIT EST AGE 18-39: CPT | Mod: S$GLB,,, | Performed by: OBSTETRICS & GYNECOLOGY

## 2020-09-22 NOTE — PROGRESS NOTES
"CC: Well woman exam    Earlene Nassar is a 30 y.o. female  presents for a well woman exam.      No c/o.  Not ready for another child. Normal cycles.  Declines contraception.  Uses condoms or coituss interruptus.      Past Medical History:   Diagnosis Date    Anxiety     Xanax prn    Asthma due to environmental allergies     Xyzal       Past Surgical History:   Procedure Laterality Date    ADENOIDECTOMY      TONSILLECTOMY         OB History    Para Term  AB Living   1 1 1     1   SAB TAB Ectopic Multiple Live Births         0 1      # Outcome Date GA Lbr Andrew/2nd Weight Sex Delivery Anes PTL Lv   1 Term 17 39w0d  3.402 kg (7 lb 8 oz) M Vag-Spont EPI N JJ      Birth Comments: 3rd degree laceration repair, was in pelvic floor PT for weakness      Obstetric Comments   Menarche ~14       Family History   Problem Relation Age of Onset    No Known Problems Mother     Heart disease Father     No Known Problems Maternal Grandmother     No Known Problems Maternal Grandfather     No Known Problems Paternal Grandmother     No Known Problems Paternal Grandfather     No Known Problems Sister     Breast cancer Neg Hx     Colon cancer Neg Hx     Ovarian cancer Neg Hx     Cervical cancer Neg Hx     Endometrial cancer Neg Hx     Vaginal cancer Neg Hx        Social History     Tobacco Use    Smoking status: Never Smoker    Smokeless tobacco: Never Used   Substance Use Topics    Alcohol use: Yes     Frequency: 2-4 times a month     Drinks per session: 1 or 2     Binge frequency: Never     Comment: occasionally    Drug use: No       /70   Ht 5' 3" (1.6 m)   Wt 53.4 kg (117 lb 13.4 oz)   LMP 2020   BMI 20.87 kg/m²     ROS:  GENERAL: Denies weight gain or weight loss. Feeling well overall.   SKIN: Denies rash or lesions.   HEAD: Denies head injury or headache.   NODES: Denies enlarged lymph nodes.   CHEST: Denies chest pain or shortness of breath.   CARDIOVASCULAR: " Denies palpitations or left sided chest pain.   ABDOMEN: No abdominal pain, constipation, diarrhea, nausea, vomiting or rectal bleeding.   URINARY: No frequency, dysuria, hematuria, or burning on urination.  REPRODUCTIVE: See HPI.   BREASTS: The patient performs breast self-examination and denies pain, lumps, or nipple discharge.   HEMATOLOGIC: No easy bruisability or excessive bleeding.  MUSCULOSKELETAL: Denies joint pain or swelling.   NEUROLOGIC: Denies syncope or weakness.   PSYCHIATRIC: Denies depression, anxiety or mood swings.    Physical Exam:    APPEARANCE: Well nourished, well developed, in no acute distress.  AFFECT: WNL, alert and oriented x 3  SKIN: No acne or hirsutism  NECK: Neck symmetric without masses or thyromegaly  NODES: No inguinal, cervical, axillary, or femoral lymph node enlargement  CHEST: Good respiratory effect  ABDOMEN: Soft.  No tenderness or masses.  No hepatosplenomegaly.  No hernias.  BREASTS: Symmetrical, no skin changes or visible lesions.  No palpable masses, nipple discharge bilaterally.  PELVIC: Normal external genitalia without lesions.  Normal hair distribution.  Adequate perineal body, normal urethral meatus.  Vagina moist and well rugated without lesions or discharge.  Cervix pink, without lesions, discharge or tenderness.  No significant cystocele or rectocele.  Bimanual exam shows uterus to be normal size, regular, mobile and nontender.  Adnexa without masses or tenderness.    EXTREMITIES: No edema.    ASSESSMENT AND PLAN  1. Encounter for gynecological examination         Patient was counseled today on A.C.S. Pap guidelines and recommendations for yearly pelvic exams, mammograms and monthly self breast exams; to see her PCP for other health maintenance.     Follow up in about 1 year (around 9/22/2021).

## 2020-09-30 NOTE — TELEPHONE ENCOUNTER
Spoke with pt, let her know yes MD wants to see her today   Closure 3 Information: This tab is for additional flaps and grafts above and beyond our usual structured repairs.  Please note if you enter information here it will not currently bill and you will need to add the billing information manually.

## 2021-01-22 ENCOUNTER — OFFICE VISIT (OUTPATIENT)
Dept: FAMILY MEDICINE | Facility: CLINIC | Age: 31
End: 2021-01-22
Payer: COMMERCIAL

## 2021-01-22 VITALS
HEIGHT: 63 IN | HEART RATE: 78 BPM | OXYGEN SATURATION: 98 % | TEMPERATURE: 98 F | WEIGHT: 121.38 LBS | SYSTOLIC BLOOD PRESSURE: 116 MMHG | DIASTOLIC BLOOD PRESSURE: 62 MMHG | BODY MASS INDEX: 21.51 KG/M2

## 2021-01-22 DIAGNOSIS — H66.004 RECURRENT ACUTE SUPPURATIVE OTITIS MEDIA OF RIGHT EAR WITHOUT SPONTANEOUS RUPTURE OF TYMPANIC MEMBRANE: Primary | ICD-10-CM

## 2021-01-22 PROCEDURE — 3008F BODY MASS INDEX DOCD: CPT | Mod: CPTII,S$GLB,, | Performed by: NURSE PRACTITIONER

## 2021-01-22 PROCEDURE — 3074F PR MOST RECENT SYSTOLIC BLOOD PRESSURE < 130 MM HG: ICD-10-PCS | Mod: CPTII,S$GLB,, | Performed by: NURSE PRACTITIONER

## 2021-01-22 PROCEDURE — 99999 PR PBB SHADOW E&M-EST. PATIENT-LVL III: CPT | Mod: PBBFAC,,, | Performed by: NURSE PRACTITIONER

## 2021-01-22 PROCEDURE — 3074F SYST BP LT 130 MM HG: CPT | Mod: CPTII,S$GLB,, | Performed by: NURSE PRACTITIONER

## 2021-01-22 PROCEDURE — 1125F AMNT PAIN NOTED PAIN PRSNT: CPT | Mod: S$GLB,,, | Performed by: NURSE PRACTITIONER

## 2021-01-22 PROCEDURE — 99213 PR OFFICE/OUTPT VISIT, EST, LEVL III, 20-29 MIN: ICD-10-PCS | Mod: S$GLB,,, | Performed by: NURSE PRACTITIONER

## 2021-01-22 PROCEDURE — 1125F PR PAIN SEVERITY QUANTIFIED, PAIN PRESENT: ICD-10-PCS | Mod: S$GLB,,, | Performed by: NURSE PRACTITIONER

## 2021-01-22 PROCEDURE — 3078F PR MOST RECENT DIASTOLIC BLOOD PRESSURE < 80 MM HG: ICD-10-PCS | Mod: CPTII,S$GLB,, | Performed by: NURSE PRACTITIONER

## 2021-01-22 PROCEDURE — 3078F DIAST BP <80 MM HG: CPT | Mod: CPTII,S$GLB,, | Performed by: NURSE PRACTITIONER

## 2021-01-22 PROCEDURE — 3008F PR BODY MASS INDEX (BMI) DOCUMENTED: ICD-10-PCS | Mod: CPTII,S$GLB,, | Performed by: NURSE PRACTITIONER

## 2021-01-22 PROCEDURE — 99999 PR PBB SHADOW E&M-EST. PATIENT-LVL III: ICD-10-PCS | Mod: PBBFAC,,, | Performed by: NURSE PRACTITIONER

## 2021-01-22 PROCEDURE — 99213 OFFICE O/P EST LOW 20 MIN: CPT | Mod: S$GLB,,, | Performed by: NURSE PRACTITIONER

## 2021-01-22 RX ORDER — CEFDINIR 300 MG/1
300 CAPSULE ORAL 2 TIMES DAILY
Qty: 20 CAPSULE | Refills: 0 | Status: SHIPPED | OUTPATIENT
Start: 2021-01-22 | End: 2021-02-01

## 2021-02-11 ENCOUNTER — OFFICE VISIT (OUTPATIENT)
Dept: URGENT CARE | Facility: CLINIC | Age: 31
End: 2021-02-11
Payer: COMMERCIAL

## 2021-02-11 VITALS
OXYGEN SATURATION: 99 % | TEMPERATURE: 99 F | RESPIRATION RATE: 14 BRPM | HEART RATE: 83 BPM | BODY MASS INDEX: 21.44 KG/M2 | SYSTOLIC BLOOD PRESSURE: 150 MMHG | HEIGHT: 63 IN | WEIGHT: 121 LBS | DIASTOLIC BLOOD PRESSURE: 91 MMHG

## 2021-02-11 DIAGNOSIS — J06.9 UPPER RESPIRATORY TRACT INFECTION, UNSPECIFIED TYPE: Primary | ICD-10-CM

## 2021-02-11 LAB
CTP QC/QA: YES
SARS-COV-2 RDRP RESP QL NAA+PROBE: NEGATIVE

## 2021-02-11 PROCEDURE — U0002: ICD-10-PCS | Mod: QW,S$GLB,, | Performed by: FAMILY MEDICINE

## 2021-02-11 PROCEDURE — U0002 COVID-19 LAB TEST NON-CDC: HCPCS | Mod: QW,S$GLB,, | Performed by: FAMILY MEDICINE

## 2021-02-11 PROCEDURE — 3008F PR BODY MASS INDEX (BMI) DOCUMENTED: ICD-10-PCS | Mod: CPTII,S$GLB,, | Performed by: FAMILY MEDICINE

## 2021-02-11 PROCEDURE — 99213 OFFICE O/P EST LOW 20 MIN: CPT | Mod: S$GLB,,, | Performed by: FAMILY MEDICINE

## 2021-02-11 PROCEDURE — 99213 PR OFFICE/OUTPT VISIT, EST, LEVL III, 20-29 MIN: ICD-10-PCS | Mod: S$GLB,,, | Performed by: FAMILY MEDICINE

## 2021-02-11 PROCEDURE — 3008F BODY MASS INDEX DOCD: CPT | Mod: CPTII,S$GLB,, | Performed by: FAMILY MEDICINE

## 2021-04-06 ENCOUNTER — PATIENT MESSAGE (OUTPATIENT)
Dept: OBSTETRICS AND GYNECOLOGY | Facility: CLINIC | Age: 31
End: 2021-04-06

## 2021-04-06 RX ORDER — ALPRAZOLAM 0.5 MG/1
0.5 TABLET ORAL 3 TIMES DAILY PRN
Qty: 30 TABLET | Refills: 0 | Status: SHIPPED | OUTPATIENT
Start: 2021-04-06 | End: 2021-09-21

## 2021-04-16 ENCOUNTER — PATIENT MESSAGE (OUTPATIENT)
Dept: RESEARCH | Facility: HOSPITAL | Age: 31
End: 2021-04-16

## 2021-07-26 ENCOUNTER — PATIENT MESSAGE (OUTPATIENT)
Dept: OBSTETRICS AND GYNECOLOGY | Facility: CLINIC | Age: 31
End: 2021-07-26

## 2021-08-16 ENCOUNTER — OFFICE VISIT (OUTPATIENT)
Dept: INTERNAL MEDICINE | Facility: CLINIC | Age: 31
End: 2021-08-16
Payer: COMMERCIAL

## 2021-08-16 ENCOUNTER — PATIENT MESSAGE (OUTPATIENT)
Dept: INTERNAL MEDICINE | Facility: CLINIC | Age: 31
End: 2021-08-16

## 2021-08-16 VITALS
SYSTOLIC BLOOD PRESSURE: 100 MMHG | WEIGHT: 123.88 LBS | TEMPERATURE: 98 F | DIASTOLIC BLOOD PRESSURE: 60 MMHG | HEIGHT: 63 IN | BODY MASS INDEX: 21.95 KG/M2 | HEART RATE: 72 BPM

## 2021-08-16 DIAGNOSIS — H60.391 INFECTIOUS OTITIS EXTERNA, RIGHT: Primary | ICD-10-CM

## 2021-08-16 PROCEDURE — 99204 OFFICE O/P NEW MOD 45 MIN: CPT | Mod: S$GLB,,, | Performed by: NURSE PRACTITIONER

## 2021-08-16 PROCEDURE — 3008F BODY MASS INDEX DOCD: CPT | Mod: CPTII,S$GLB,, | Performed by: NURSE PRACTITIONER

## 2021-08-16 PROCEDURE — 3074F SYST BP LT 130 MM HG: CPT | Mod: CPTII,S$GLB,, | Performed by: NURSE PRACTITIONER

## 2021-08-16 PROCEDURE — 1159F MED LIST DOCD IN RCRD: CPT | Mod: CPTII,S$GLB,, | Performed by: NURSE PRACTITIONER

## 2021-08-16 PROCEDURE — 1159F PR MEDICATION LIST DOCUMENTED IN MEDICAL RECORD: ICD-10-PCS | Mod: CPTII,S$GLB,, | Performed by: NURSE PRACTITIONER

## 2021-08-16 PROCEDURE — 3074F PR MOST RECENT SYSTOLIC BLOOD PRESSURE < 130 MM HG: ICD-10-PCS | Mod: CPTII,S$GLB,, | Performed by: NURSE PRACTITIONER

## 2021-08-16 PROCEDURE — 3078F PR MOST RECENT DIASTOLIC BLOOD PRESSURE < 80 MM HG: ICD-10-PCS | Mod: CPTII,S$GLB,, | Performed by: NURSE PRACTITIONER

## 2021-08-16 PROCEDURE — 3008F PR BODY MASS INDEX (BMI) DOCUMENTED: ICD-10-PCS | Mod: CPTII,S$GLB,, | Performed by: NURSE PRACTITIONER

## 2021-08-16 PROCEDURE — 99999 PR PBB SHADOW E&M-EST. PATIENT-LVL III: CPT | Mod: PBBFAC,,, | Performed by: NURSE PRACTITIONER

## 2021-08-16 PROCEDURE — 99999 PR PBB SHADOW E&M-EST. PATIENT-LVL III: ICD-10-PCS | Mod: PBBFAC,,, | Performed by: NURSE PRACTITIONER

## 2021-08-16 PROCEDURE — 1125F AMNT PAIN NOTED PAIN PRSNT: CPT | Mod: CPTII,S$GLB,, | Performed by: NURSE PRACTITIONER

## 2021-08-16 PROCEDURE — 99204 PR OFFICE/OUTPT VISIT, NEW, LEVL IV, 45-59 MIN: ICD-10-PCS | Mod: S$GLB,,, | Performed by: NURSE PRACTITIONER

## 2021-08-16 PROCEDURE — 3078F DIAST BP <80 MM HG: CPT | Mod: CPTII,S$GLB,, | Performed by: NURSE PRACTITIONER

## 2021-08-16 PROCEDURE — 1125F PR PAIN SEVERITY QUANTIFIED, PAIN PRESENT: ICD-10-PCS | Mod: CPTII,S$GLB,, | Performed by: NURSE PRACTITIONER

## 2021-08-16 RX ORDER — NEOMYCIN SULFATE, POLYMYXIN B SULFATE AND HYDROCORTISONE 10; 3.5; 1 MG/ML; MG/ML; [USP'U]/ML
4 SUSPENSION/ DROPS AURICULAR (OTIC) 2 TIMES DAILY
Qty: 4 ML | Refills: 0 | Status: SHIPPED | OUTPATIENT
Start: 2021-08-16 | End: 2021-08-23

## 2021-08-17 ENCOUNTER — TELEPHONE (OUTPATIENT)
Dept: INTERNAL MEDICINE | Facility: CLINIC | Age: 31
End: 2021-08-17

## 2021-08-19 ENCOUNTER — PATIENT MESSAGE (OUTPATIENT)
Dept: FAMILY MEDICINE | Facility: CLINIC | Age: 31
End: 2021-08-19

## 2021-09-02 ENCOUNTER — PATIENT MESSAGE (OUTPATIENT)
Dept: OBSTETRICS AND GYNECOLOGY | Facility: CLINIC | Age: 31
End: 2021-09-02

## 2021-09-20 ENCOUNTER — PATIENT MESSAGE (OUTPATIENT)
Dept: OBSTETRICS AND GYNECOLOGY | Facility: CLINIC | Age: 31
End: 2021-09-20

## 2021-09-21 ENCOUNTER — OFFICE VISIT (OUTPATIENT)
Dept: OBSTETRICS AND GYNECOLOGY | Facility: CLINIC | Age: 31
End: 2021-09-21
Payer: COMMERCIAL

## 2021-09-21 VITALS
WEIGHT: 121.25 LBS | HEIGHT: 63 IN | BODY MASS INDEX: 21.48 KG/M2 | DIASTOLIC BLOOD PRESSURE: 60 MMHG | SYSTOLIC BLOOD PRESSURE: 110 MMHG

## 2021-09-21 DIAGNOSIS — Z32.01 POSITIVE PREGNANCY TEST: ICD-10-CM

## 2021-09-21 DIAGNOSIS — Z34.90 PREGNANCY, UNSPECIFIED GESTATIONAL AGE: Primary | ICD-10-CM

## 2021-09-21 DIAGNOSIS — O21.9 NAUSEA AND VOMITING DURING PREGNANCY: ICD-10-CM

## 2021-09-21 LAB
B-HCG UR QL: POSITIVE
CTP QC/QA: YES

## 2021-09-21 PROCEDURE — 3074F SYST BP LT 130 MM HG: CPT | Mod: CPTII,S$GLB,, | Performed by: NURSE PRACTITIONER

## 2021-09-21 PROCEDURE — 1160F RVW MEDS BY RX/DR IN RCRD: CPT | Mod: CPTII,S$GLB,, | Performed by: NURSE PRACTITIONER

## 2021-09-21 PROCEDURE — 81025 POCT URINE PREGNANCY: ICD-10-PCS | Mod: S$GLB,,, | Performed by: NURSE PRACTITIONER

## 2021-09-21 PROCEDURE — 87591 N.GONORRHOEAE DNA AMP PROB: CPT | Performed by: NURSE PRACTITIONER

## 2021-09-21 PROCEDURE — 1159F MED LIST DOCD IN RCRD: CPT | Mod: CPTII,S$GLB,, | Performed by: NURSE PRACTITIONER

## 2021-09-21 PROCEDURE — 3008F BODY MASS INDEX DOCD: CPT | Mod: CPTII,S$GLB,, | Performed by: NURSE PRACTITIONER

## 2021-09-21 PROCEDURE — 3078F PR MOST RECENT DIASTOLIC BLOOD PRESSURE < 80 MM HG: ICD-10-PCS | Mod: CPTII,S$GLB,, | Performed by: NURSE PRACTITIONER

## 2021-09-21 PROCEDURE — 99999 PR PBB SHADOW E&M-EST. PATIENT-LVL III: ICD-10-PCS | Mod: PBBFAC,,, | Performed by: NURSE PRACTITIONER

## 2021-09-21 PROCEDURE — 3078F DIAST BP <80 MM HG: CPT | Mod: CPTII,S$GLB,, | Performed by: NURSE PRACTITIONER

## 2021-09-21 PROCEDURE — 99213 PR OFFICE/OUTPT VISIT, EST, LEVL III, 20-29 MIN: ICD-10-PCS | Mod: S$GLB,,, | Performed by: NURSE PRACTITIONER

## 2021-09-21 PROCEDURE — 3008F PR BODY MASS INDEX (BMI) DOCUMENTED: ICD-10-PCS | Mod: CPTII,S$GLB,, | Performed by: NURSE PRACTITIONER

## 2021-09-21 PROCEDURE — 3074F PR MOST RECENT SYSTOLIC BLOOD PRESSURE < 130 MM HG: ICD-10-PCS | Mod: CPTII,S$GLB,, | Performed by: NURSE PRACTITIONER

## 2021-09-21 PROCEDURE — 1159F PR MEDICATION LIST DOCUMENTED IN MEDICAL RECORD: ICD-10-PCS | Mod: CPTII,S$GLB,, | Performed by: NURSE PRACTITIONER

## 2021-09-21 PROCEDURE — 99213 OFFICE O/P EST LOW 20 MIN: CPT | Mod: S$GLB,,, | Performed by: NURSE PRACTITIONER

## 2021-09-21 PROCEDURE — 87086 URINE CULTURE/COLONY COUNT: CPT | Performed by: NURSE PRACTITIONER

## 2021-09-21 PROCEDURE — 99999 PR PBB SHADOW E&M-EST. PATIENT-LVL III: CPT | Mod: PBBFAC,,, | Performed by: NURSE PRACTITIONER

## 2021-09-21 PROCEDURE — 87491 CHLMYD TRACH DNA AMP PROBE: CPT | Performed by: NURSE PRACTITIONER

## 2021-09-21 PROCEDURE — 1160F PR REVIEW ALL MEDS BY PRESCRIBER/CLIN PHARMACIST DOCUMENTED: ICD-10-PCS | Mod: CPTII,S$GLB,, | Performed by: NURSE PRACTITIONER

## 2021-09-21 PROCEDURE — 81025 URINE PREGNANCY TEST: CPT | Mod: S$GLB,,, | Performed by: NURSE PRACTITIONER

## 2021-09-21 RX ORDER — ONDANSETRON 4 MG/1
4 TABLET, FILM COATED ORAL EVERY 12 HOURS PRN
Qty: 60 TABLET | Refills: 1 | Status: SHIPPED | OUTPATIENT
Start: 2021-09-21 | End: 2022-08-30

## 2021-09-21 RX ORDER — DOXYLAMINE SUCCINATE AND PYRIDOXINE HYDROCHLORIDE, DELAYED RELEASE TABLETS 10 MG/10 MG 10; 10 MG/1; MG/1
2 TABLET, DELAYED RELEASE ORAL NIGHTLY
Qty: 60 TABLET | Refills: 2 | Status: SHIPPED | OUTPATIENT
Start: 2021-09-21 | End: 2021-10-25 | Stop reason: SDUPTHER

## 2021-09-22 LAB
BACTERIA UR CULT: NO GROWTH
C TRACH DNA SPEC QL NAA+PROBE: NOT DETECTED
N GONORRHOEA DNA SPEC QL NAA+PROBE: NOT DETECTED

## 2021-10-06 ENCOUNTER — LAB VISIT (OUTPATIENT)
Dept: LAB | Facility: HOSPITAL | Age: 31
End: 2021-10-06
Attending: OBSTETRICS & GYNECOLOGY
Payer: COMMERCIAL

## 2021-10-06 ENCOUNTER — PROCEDURE VISIT (OUTPATIENT)
Dept: OBSTETRICS AND GYNECOLOGY | Facility: CLINIC | Age: 31
End: 2021-10-06
Payer: COMMERCIAL

## 2021-10-06 ENCOUNTER — INITIAL PRENATAL (OUTPATIENT)
Dept: OBSTETRICS AND GYNECOLOGY | Facility: CLINIC | Age: 31
End: 2021-10-06
Payer: COMMERCIAL

## 2021-10-06 VITALS
SYSTOLIC BLOOD PRESSURE: 110 MMHG | BODY MASS INDEX: 21.67 KG/M2 | DIASTOLIC BLOOD PRESSURE: 60 MMHG | WEIGHT: 122.38 LBS

## 2021-10-06 DIAGNOSIS — Z34.90 PREGNANCY, UNSPECIFIED GESTATIONAL AGE: ICD-10-CM

## 2021-10-06 DIAGNOSIS — Z3A.08 8 WEEKS GESTATION OF PREGNANCY: Primary | ICD-10-CM

## 2021-10-06 LAB
BASOPHILS # BLD AUTO: 0.05 K/UL (ref 0–0.2)
BASOPHILS NFR BLD: 0.5 % (ref 0–1.9)
DIFFERENTIAL METHOD: NORMAL
EOSINOPHIL # BLD AUTO: 0 K/UL (ref 0–0.5)
EOSINOPHIL NFR BLD: 0.3 % (ref 0–8)
ERYTHROCYTE [DISTWIDTH] IN BLOOD BY AUTOMATED COUNT: 12.3 % (ref 11.5–14.5)
GLUCOSE SERPL-MCNC: 86 MG/DL (ref 70–110)
HCT VFR BLD AUTO: 40.1 % (ref 37–48.5)
HGB BLD-MCNC: 13.4 G/DL (ref 12–16)
IMM GRANULOCYTES # BLD AUTO: 0.03 K/UL (ref 0–0.04)
IMM GRANULOCYTES NFR BLD AUTO: 0.3 % (ref 0–0.5)
LYMPHOCYTES # BLD AUTO: 2.5 K/UL (ref 1–4.8)
LYMPHOCYTES NFR BLD: 26.6 % (ref 18–48)
MCH RBC QN AUTO: 29.8 PG (ref 27–31)
MCHC RBC AUTO-ENTMCNC: 33.4 G/DL (ref 32–36)
MCV RBC AUTO: 89 FL (ref 82–98)
MONOCYTES # BLD AUTO: 1 K/UL (ref 0.3–1)
MONOCYTES NFR BLD: 10.6 % (ref 4–15)
NEUTROPHILS # BLD AUTO: 5.8 K/UL (ref 1.8–7.7)
NEUTROPHILS NFR BLD: 61.7 % (ref 38–73)
NRBC BLD-RTO: 0 /100 WBC
PLATELET # BLD AUTO: 310 K/UL (ref 150–450)
PMV BLD AUTO: 10.5 FL (ref 9.2–12.9)
RBC # BLD AUTO: 4.5 M/UL (ref 4–5.4)
T4 FREE SERPL-MCNC: 1.02 NG/DL (ref 0.71–1.51)
TSH SERPL DL<=0.005 MIU/L-ACNC: 0.24 UIU/ML (ref 0.4–4)
WBC # BLD AUTO: 9.41 K/UL (ref 3.9–12.7)

## 2021-10-06 PROCEDURE — 87340 HEPATITIS B SURFACE AG IA: CPT | Performed by: NURSE PRACTITIONER

## 2021-10-06 PROCEDURE — 84443 ASSAY THYROID STIM HORMONE: CPT | Performed by: NURSE PRACTITIONER

## 2021-10-06 PROCEDURE — 99999 PR PBB SHADOW E&M-EST. PATIENT-LVL III: CPT | Mod: PBBFAC,,, | Performed by: NURSE PRACTITIONER

## 2021-10-06 PROCEDURE — 76801 OB US < 14 WKS SINGLE FETUS: CPT | Mod: PBBFAC,PN | Performed by: OBSTETRICS & GYNECOLOGY

## 2021-10-06 PROCEDURE — 86762 RUBELLA ANTIBODY: CPT | Performed by: NURSE PRACTITIONER

## 2021-10-06 PROCEDURE — 86900 BLOOD TYPING SEROLOGIC ABO: CPT | Performed by: NURSE PRACTITIONER

## 2021-10-06 PROCEDURE — 0502F PR SUBSEQUENT PRENATAL CARE: ICD-10-PCS | Mod: CPTII,S$GLB,, | Performed by: NURSE PRACTITIONER

## 2021-10-06 PROCEDURE — 99999 PR PBB SHADOW E&M-EST. PATIENT-LVL III: ICD-10-PCS | Mod: PBBFAC,,, | Performed by: NURSE PRACTITIONER

## 2021-10-06 PROCEDURE — 84439 ASSAY OF FREE THYROXINE: CPT | Performed by: NURSE PRACTITIONER

## 2021-10-06 PROCEDURE — 82947 ASSAY GLUCOSE BLOOD QUANT: CPT | Performed by: NURSE PRACTITIONER

## 2021-10-06 PROCEDURE — 86592 SYPHILIS TEST NON-TREP QUAL: CPT | Performed by: NURSE PRACTITIONER

## 2021-10-06 PROCEDURE — 0502F SUBSEQUENT PRENATAL CARE: CPT | Mod: CPTII,S$GLB,, | Performed by: NURSE PRACTITIONER

## 2021-10-06 PROCEDURE — 87389 HIV-1 AG W/HIV-1&-2 AB AG IA: CPT | Performed by: NURSE PRACTITIONER

## 2021-10-06 PROCEDURE — 85025 COMPLETE CBC W/AUTO DIFF WBC: CPT | Performed by: NURSE PRACTITIONER

## 2021-10-07 LAB
HBV SURFACE AG SERPL QL IA: NEGATIVE
HIV 1+2 AB+HIV1 P24 AG SERPL QL IA: NEGATIVE
RPR SER QL: NORMAL
RUBV IGG SER-ACNC: 12.7 IU/ML
RUBV IGG SER-IMP: REACTIVE

## 2021-10-15 LAB
ABO + RH BLD: NORMAL
BLD GP AB SCN CELLS X3 SERPL QL: NORMAL

## 2021-10-25 ENCOUNTER — PATIENT MESSAGE (OUTPATIENT)
Dept: OBSTETRICS AND GYNECOLOGY | Facility: CLINIC | Age: 31
End: 2021-10-25
Payer: COMMERCIAL

## 2021-10-25 ENCOUNTER — LAB VISIT (OUTPATIENT)
Dept: LAB | Facility: HOSPITAL | Age: 31
End: 2021-10-25
Attending: OBSTETRICS & GYNECOLOGY
Payer: COMMERCIAL

## 2021-10-25 DIAGNOSIS — R30.0 BURNING WITH URINATION: ICD-10-CM

## 2021-10-25 DIAGNOSIS — R30.0 BURNING WITH URINATION: Primary | ICD-10-CM

## 2021-10-25 PROCEDURE — 87086 URINE CULTURE/COLONY COUNT: CPT | Performed by: OBSTETRICS & GYNECOLOGY

## 2021-10-26 LAB
BACTERIA UR CULT: NORMAL
BACTERIA UR CULT: NORMAL

## 2021-10-28 ENCOUNTER — ROUTINE PRENATAL (OUTPATIENT)
Dept: OBSTETRICS AND GYNECOLOGY | Facility: CLINIC | Age: 31
End: 2021-10-28
Attending: OBSTETRICS & GYNECOLOGY
Payer: COMMERCIAL

## 2021-10-28 ENCOUNTER — PATIENT MESSAGE (OUTPATIENT)
Dept: ADMINISTRATIVE | Facility: OTHER | Age: 31
End: 2021-10-28
Payer: COMMERCIAL

## 2021-10-28 VITALS — WEIGHT: 124 LBS | SYSTOLIC BLOOD PRESSURE: 98 MMHG | DIASTOLIC BLOOD PRESSURE: 60 MMHG | BODY MASS INDEX: 21.97 KG/M2

## 2021-10-28 DIAGNOSIS — N89.8 VAGINAL ITCHING: ICD-10-CM

## 2021-10-28 DIAGNOSIS — N89.8 VAGINAL DISCHARGE: ICD-10-CM

## 2021-10-28 DIAGNOSIS — Z34.80 SUPERVISION OF OTHER NORMAL PREGNANCY, ANTEPARTUM: Primary | ICD-10-CM

## 2021-10-28 PROCEDURE — 87086 URINE CULTURE/COLONY COUNT: CPT | Performed by: OBSTETRICS & GYNECOLOGY

## 2021-10-28 PROCEDURE — 99999 PR PBB SHADOW E&M-EST. PATIENT-LVL II: CPT | Mod: PBBFAC,,, | Performed by: OBSTETRICS & GYNECOLOGY

## 2021-10-28 PROCEDURE — 87147 CULTURE TYPE IMMUNOLOGIC: CPT | Performed by: OBSTETRICS & GYNECOLOGY

## 2021-10-28 PROCEDURE — 0502F PR SUBSEQUENT PRENATAL CARE: ICD-10-PCS | Mod: CPTII,S$GLB,, | Performed by: OBSTETRICS & GYNECOLOGY

## 2021-10-28 PROCEDURE — 87186 SC STD MICRODIL/AGAR DIL: CPT | Performed by: OBSTETRICS & GYNECOLOGY

## 2021-10-28 PROCEDURE — 99999 PR PBB SHADOW E&M-EST. PATIENT-LVL II: ICD-10-PCS | Mod: PBBFAC,,, | Performed by: OBSTETRICS & GYNECOLOGY

## 2021-10-28 PROCEDURE — 0502F SUBSEQUENT PRENATAL CARE: CPT | Mod: CPTII,S$GLB,, | Performed by: OBSTETRICS & GYNECOLOGY

## 2021-10-28 PROCEDURE — 87077 CULTURE AEROBIC IDENTIFY: CPT | Performed by: OBSTETRICS & GYNECOLOGY

## 2021-10-28 PROCEDURE — 87088 URINE BACTERIA CULTURE: CPT | Performed by: OBSTETRICS & GYNECOLOGY

## 2021-10-28 RX ORDER — ONDANSETRON 4 MG/1
TABLET, FILM COATED ORAL
COMMUNITY
Start: 2021-10-25 | End: 2021-12-27 | Stop reason: SDUPTHER

## 2021-10-28 RX ORDER — NYSTATIN AND TRIAMCINOLONE ACETONIDE 100000; 1 [USP'U]/G; MG/G
OINTMENT TOPICAL 2 TIMES DAILY
Qty: 30 G | Refills: 3 | Status: SHIPPED | OUTPATIENT
Start: 2021-10-28 | End: 2022-02-10

## 2021-11-01 LAB
BACTERIA UR CULT: ABNORMAL
BACTERIA UR CULT: ABNORMAL

## 2021-11-02 ENCOUNTER — TELEPHONE (OUTPATIENT)
Dept: OBSTETRICS AND GYNECOLOGY | Facility: CLINIC | Age: 31
End: 2021-11-02
Payer: COMMERCIAL

## 2021-11-02 ENCOUNTER — ROUTINE PRENATAL (OUTPATIENT)
Dept: OBSTETRICS AND GYNECOLOGY | Facility: CLINIC | Age: 31
End: 2021-11-02
Attending: OBSTETRICS & GYNECOLOGY
Payer: COMMERCIAL

## 2021-11-02 VITALS
SYSTOLIC BLOOD PRESSURE: 114 MMHG | DIASTOLIC BLOOD PRESSURE: 66 MMHG | WEIGHT: 126.63 LBS | BODY MASS INDEX: 22.44 KG/M2

## 2021-11-02 DIAGNOSIS — R82.71 GROUP B STREPTOCOCCAL BACTERIURIA: ICD-10-CM

## 2021-11-02 DIAGNOSIS — O09.299 HX OF MATERNAL LACERATION, 3RD DEGREE, CURRENTLY PREGNANT: ICD-10-CM

## 2021-11-02 DIAGNOSIS — Z34.80 SUPERVISION OF OTHER NORMAL PREGNANCY, ANTEPARTUM: Primary | ICD-10-CM

## 2021-11-02 DIAGNOSIS — R82.90 ABNORMAL URINE: ICD-10-CM

## 2021-11-02 PROBLEM — N92.1 MENORRHAGIA WITH IRREGULAR CYCLE: Status: RESOLVED | Noted: 2017-06-04 | Resolved: 2021-11-02

## 2021-11-02 PROCEDURE — 99999 PR PBB SHADOW E&M-EST. PATIENT-LVL III: CPT | Mod: PBBFAC,,, | Performed by: OBSTETRICS & GYNECOLOGY

## 2021-11-02 PROCEDURE — 0502F SUBSEQUENT PRENATAL CARE: CPT | Mod: CPTII,S$GLB,, | Performed by: OBSTETRICS & GYNECOLOGY

## 2021-11-02 PROCEDURE — 0502F PR SUBSEQUENT PRENATAL CARE: ICD-10-PCS | Mod: CPTII,S$GLB,, | Performed by: OBSTETRICS & GYNECOLOGY

## 2021-11-02 PROCEDURE — 99999 PR PBB SHADOW E&M-EST. PATIENT-LVL III: ICD-10-PCS | Mod: PBBFAC,,, | Performed by: OBSTETRICS & GYNECOLOGY

## 2021-11-02 RX ORDER — AMPICILLIN 500 MG/1
500 CAPSULE ORAL EVERY 6 HOURS
Qty: 120 EACH | Refills: 0 | Status: SHIPPED | OUTPATIENT
Start: 2021-11-02 | End: 2021-12-02

## 2021-11-02 RX ORDER — AMPICILLIN 500 MG/1
500 CAPSULE ORAL EVERY 6 HOURS
Qty: 12 CAPSULE | Refills: 0 | Status: CANCELLED | OUTPATIENT
Start: 2021-11-02 | End: 2021-12-02

## 2021-11-02 RX ORDER — PENICILLIN V POTASSIUM 500 MG/1
500 TABLET, FILM COATED ORAL EVERY 8 HOURS
Qty: 21 TABLET | Refills: 0 | Status: SHIPPED | OUTPATIENT
Start: 2021-11-02 | End: 2021-11-09

## 2021-11-04 ENCOUNTER — PATIENT MESSAGE (OUTPATIENT)
Dept: ADMINISTRATIVE | Facility: OTHER | Age: 31
End: 2021-11-04
Payer: COMMERCIAL

## 2021-11-18 ENCOUNTER — PATIENT MESSAGE (OUTPATIENT)
Dept: OBSTETRICS AND GYNECOLOGY | Facility: CLINIC | Age: 31
End: 2021-11-18
Payer: COMMERCIAL

## 2021-12-02 ENCOUNTER — LAB VISIT (OUTPATIENT)
Dept: LAB | Facility: OTHER | Age: 31
End: 2021-12-02
Attending: OBSTETRICS & GYNECOLOGY
Payer: COMMERCIAL

## 2021-12-02 ENCOUNTER — ROUTINE PRENATAL (OUTPATIENT)
Dept: OBSTETRICS AND GYNECOLOGY | Facility: CLINIC | Age: 31
End: 2021-12-02
Attending: OBSTETRICS & GYNECOLOGY
Payer: COMMERCIAL

## 2021-12-02 VITALS — BODY MASS INDEX: 22.85 KG/M2 | WEIGHT: 129 LBS | DIASTOLIC BLOOD PRESSURE: 78 MMHG | SYSTOLIC BLOOD PRESSURE: 114 MMHG

## 2021-12-02 DIAGNOSIS — Z34.80 SUPERVISION OF OTHER NORMAL PREGNANCY, ANTEPARTUM: ICD-10-CM

## 2021-12-02 DIAGNOSIS — Z34.80 SUPERVISION OF OTHER NORMAL PREGNANCY, ANTEPARTUM: Primary | ICD-10-CM

## 2021-12-02 DIAGNOSIS — E07.9 THYROID DISORDER: ICD-10-CM

## 2021-12-02 LAB
T3FREE SERPL-MCNC: 3.1 PG/ML (ref 2.3–4.2)
T4 FREE SERPL-MCNC: 0.99 NG/DL (ref 0.71–1.51)
TSH SERPL DL<=0.005 MIU/L-ACNC: 0.64 UIU/ML (ref 0.4–4)

## 2021-12-02 PROCEDURE — 84439 ASSAY OF FREE THYROXINE: CPT | Performed by: OBSTETRICS & GYNECOLOGY

## 2021-12-02 PROCEDURE — 99999 PR PBB SHADOW E&M-EST. PATIENT-LVL II: CPT | Mod: PBBFAC,,, | Performed by: OBSTETRICS & GYNECOLOGY

## 2021-12-02 PROCEDURE — 0502F PR SUBSEQUENT PRENATAL CARE: ICD-10-PCS | Mod: CPTII,S$GLB,, | Performed by: OBSTETRICS & GYNECOLOGY

## 2021-12-02 PROCEDURE — 36415 COLL VENOUS BLD VENIPUNCTURE: CPT | Performed by: OBSTETRICS & GYNECOLOGY

## 2021-12-02 PROCEDURE — 0502F SUBSEQUENT PRENATAL CARE: CPT | Mod: CPTII,S$GLB,, | Performed by: OBSTETRICS & GYNECOLOGY

## 2021-12-02 PROCEDURE — 84481 FREE ASSAY (FT-3): CPT | Performed by: OBSTETRICS & GYNECOLOGY

## 2021-12-02 PROCEDURE — 84443 ASSAY THYROID STIM HORMONE: CPT | Performed by: OBSTETRICS & GYNECOLOGY

## 2021-12-02 PROCEDURE — 99999 PR PBB SHADOW E&M-EST. PATIENT-LVL II: ICD-10-PCS | Mod: PBBFAC,,, | Performed by: OBSTETRICS & GYNECOLOGY

## 2021-12-15 ENCOUNTER — PATIENT MESSAGE (OUTPATIENT)
Dept: MATERNAL FETAL MEDICINE | Facility: CLINIC | Age: 31
End: 2021-12-15
Payer: COMMERCIAL

## 2021-12-16 ENCOUNTER — TELEPHONE (OUTPATIENT)
Dept: OBSTETRICS AND GYNECOLOGY | Facility: CLINIC | Age: 31
End: 2021-12-16
Payer: COMMERCIAL

## 2021-12-16 ENCOUNTER — PROCEDURE VISIT (OUTPATIENT)
Dept: MATERNAL FETAL MEDICINE | Facility: CLINIC | Age: 31
End: 2021-12-16
Payer: COMMERCIAL

## 2021-12-16 DIAGNOSIS — Z36.89 ENCOUNTER FOR ULTRASOUND TO CHECK FETAL GROWTH: Primary | ICD-10-CM

## 2021-12-16 DIAGNOSIS — Z36.89 ULTRASOUND SCAN TO EVALUATE PLACENTA LOCATION: ICD-10-CM

## 2021-12-16 DIAGNOSIS — Z34.90 PREGNANCY, UNSPECIFIED GESTATIONAL AGE: ICD-10-CM

## 2021-12-16 PROCEDURE — 76805 US MFM PROCEDURE (VIEWPOINT): ICD-10-PCS | Mod: S$GLB,,, | Performed by: OBSTETRICS & GYNECOLOGY

## 2021-12-16 PROCEDURE — 76805 OB US >/= 14 WKS SNGL FETUS: CPT | Mod: S$GLB,,, | Performed by: OBSTETRICS & GYNECOLOGY

## 2021-12-19 ENCOUNTER — PATIENT MESSAGE (OUTPATIENT)
Dept: OBSTETRICS AND GYNECOLOGY | Facility: CLINIC | Age: 31
End: 2021-12-19
Payer: COMMERCIAL

## 2021-12-19 DIAGNOSIS — Z77.011 LEAD EXPOSURE: Primary | ICD-10-CM

## 2021-12-20 NOTE — TELEPHONE ENCOUNTER
Pt's  has been on a lead job at work for the past 2 weeks.  He has been following all protocols and precautions but pt is worried he may be carrying it home on his clothes and in his hair or car.  She is asking if she should have lead testing?

## 2021-12-27 ENCOUNTER — PATIENT MESSAGE (OUTPATIENT)
Dept: OBSTETRICS AND GYNECOLOGY | Facility: CLINIC | Age: 31
End: 2021-12-27
Payer: COMMERCIAL

## 2021-12-27 RX ORDER — ONDANSETRON 4 MG/1
8 TABLET, FILM COATED ORAL EVERY 6 HOURS PRN
Qty: 30 TABLET | Refills: 1 | Status: SHIPPED | OUTPATIENT
Start: 2021-12-27 | End: 2022-02-10

## 2021-12-28 ENCOUNTER — PATIENT MESSAGE (OUTPATIENT)
Dept: OBSTETRICS AND GYNECOLOGY | Facility: CLINIC | Age: 31
End: 2021-12-28
Payer: COMMERCIAL

## 2021-12-28 ENCOUNTER — OFFICE VISIT (OUTPATIENT)
Dept: URGENT CARE | Facility: CLINIC | Age: 31
End: 2021-12-28
Payer: COMMERCIAL

## 2021-12-28 VITALS
SYSTOLIC BLOOD PRESSURE: 115 MMHG | HEIGHT: 63 IN | DIASTOLIC BLOOD PRESSURE: 87 MMHG | BODY MASS INDEX: 22.68 KG/M2 | WEIGHT: 128 LBS | OXYGEN SATURATION: 98 % | RESPIRATION RATE: 16 BRPM | TEMPERATURE: 98 F | HEART RATE: 78 BPM

## 2021-12-28 DIAGNOSIS — U07.1 COVID-19 VIRUS INFECTION: Primary | ICD-10-CM

## 2021-12-28 DIAGNOSIS — R05.9 COUGH: ICD-10-CM

## 2021-12-28 LAB
CTP QC/QA: YES
SARS-COV-2 RDRP RESP QL NAA+PROBE: POSITIVE

## 2021-12-28 PROCEDURE — 3079F DIAST BP 80-89 MM HG: CPT | Mod: CPTII,S$GLB,, | Performed by: PHYSICIAN ASSISTANT

## 2021-12-28 PROCEDURE — U0002 COVID-19 LAB TEST NON-CDC: HCPCS | Mod: QW,S$GLB,, | Performed by: PHYSICIAN ASSISTANT

## 2021-12-28 PROCEDURE — 1160F PR REVIEW ALL MEDS BY PRESCRIBER/CLIN PHARMACIST DOCUMENTED: ICD-10-PCS | Mod: CPTII,S$GLB,, | Performed by: PHYSICIAN ASSISTANT

## 2021-12-28 PROCEDURE — 3079F PR MOST RECENT DIASTOLIC BLOOD PRESSURE 80-89 MM HG: ICD-10-PCS | Mod: CPTII,S$GLB,, | Performed by: PHYSICIAN ASSISTANT

## 2021-12-28 PROCEDURE — U0002: ICD-10-PCS | Mod: QW,S$GLB,, | Performed by: PHYSICIAN ASSISTANT

## 2021-12-28 PROCEDURE — 99213 PR OFFICE/OUTPT VISIT, EST, LEVL III, 20-29 MIN: ICD-10-PCS | Mod: S$GLB,,, | Performed by: PHYSICIAN ASSISTANT

## 2021-12-28 PROCEDURE — 99213 OFFICE O/P EST LOW 20 MIN: CPT | Mod: S$GLB,,, | Performed by: PHYSICIAN ASSISTANT

## 2021-12-28 PROCEDURE — 3008F BODY MASS INDEX DOCD: CPT | Mod: CPTII,S$GLB,, | Performed by: PHYSICIAN ASSISTANT

## 2021-12-28 PROCEDURE — 3074F SYST BP LT 130 MM HG: CPT | Mod: CPTII,S$GLB,, | Performed by: PHYSICIAN ASSISTANT

## 2021-12-28 PROCEDURE — 3074F PR MOST RECENT SYSTOLIC BLOOD PRESSURE < 130 MM HG: ICD-10-PCS | Mod: CPTII,S$GLB,, | Performed by: PHYSICIAN ASSISTANT

## 2021-12-28 PROCEDURE — 1159F MED LIST DOCD IN RCRD: CPT | Mod: CPTII,S$GLB,, | Performed by: PHYSICIAN ASSISTANT

## 2021-12-28 PROCEDURE — 3008F PR BODY MASS INDEX (BMI) DOCUMENTED: ICD-10-PCS | Mod: CPTII,S$GLB,, | Performed by: PHYSICIAN ASSISTANT

## 2021-12-28 PROCEDURE — 1160F RVW MEDS BY RX/DR IN RCRD: CPT | Mod: CPTII,S$GLB,, | Performed by: PHYSICIAN ASSISTANT

## 2021-12-28 PROCEDURE — 1159F PR MEDICATION LIST DOCUMENTED IN MEDICAL RECORD: ICD-10-PCS | Mod: CPTII,S$GLB,, | Performed by: PHYSICIAN ASSISTANT

## 2021-12-28 RX ORDER — ALBUTEROL SULFATE 90 UG/1
2 AEROSOL, METERED RESPIRATORY (INHALATION) EVERY 4 HOURS PRN
Qty: 6.7 G | Refills: 0 | Status: SHIPPED | OUTPATIENT
Start: 2021-12-28 | End: 2022-01-13

## 2022-01-13 ENCOUNTER — PATIENT MESSAGE (OUTPATIENT)
Dept: OBSTETRICS AND GYNECOLOGY | Facility: CLINIC | Age: 32
End: 2022-01-13

## 2022-01-13 ENCOUNTER — ROUTINE PRENATAL (OUTPATIENT)
Dept: OBSTETRICS AND GYNECOLOGY | Facility: CLINIC | Age: 32
End: 2022-01-13
Attending: OBSTETRICS & GYNECOLOGY
Payer: COMMERCIAL

## 2022-01-13 ENCOUNTER — TELEPHONE (OUTPATIENT)
Dept: OBSTETRICS AND GYNECOLOGY | Facility: CLINIC | Age: 32
End: 2022-01-13

## 2022-01-13 VITALS
SYSTOLIC BLOOD PRESSURE: 114 MMHG | BODY MASS INDEX: 24.08 KG/M2 | DIASTOLIC BLOOD PRESSURE: 70 MMHG | WEIGHT: 135.94 LBS

## 2022-01-13 DIAGNOSIS — Z36.9 ANTENATAL SCREENING ENCOUNTER: Primary | ICD-10-CM

## 2022-01-13 PROCEDURE — 0502F PR SUBSEQUENT PRENATAL CARE: ICD-10-PCS | Mod: CPTII,S$GLB,, | Performed by: OBSTETRICS & GYNECOLOGY

## 2022-01-13 PROCEDURE — 99999 PR PBB SHADOW E&M-EST. PATIENT-LVL II: ICD-10-PCS | Mod: PBBFAC,,, | Performed by: OBSTETRICS & GYNECOLOGY

## 2022-01-13 PROCEDURE — 0502F SUBSEQUENT PRENATAL CARE: CPT | Mod: CPTII,S$GLB,, | Performed by: OBSTETRICS & GYNECOLOGY

## 2022-01-13 PROCEDURE — 99999 PR PBB SHADOW E&M-EST. PATIENT-LVL II: CPT | Mod: PBBFAC,,, | Performed by: OBSTETRICS & GYNECOLOGY

## 2022-01-13 NOTE — PROGRESS NOTES
Establishing care with me. Previous [facility] patient. After chart review had 4th degree laceration that took a long time to fully heals. Still with some residual discomfort with sex. Had to do pelvic floor PT for awhile. We discussed options and decided on LTCS. Will see the size in 3rd trimester but leaning towards c/s. All questions answered

## 2022-01-13 NOTE — TELEPHONE ENCOUNTER
----- Message from Sariah Mejia MD sent at 1/13/2022 10:29 AM CST -----  Schedule primary LTCS  Dx- previous 4th degree tear  May 5th at 7 am

## 2022-01-13 NOTE — TELEPHONE ENCOUNTER
C/s order set requested for 5/5/2022 at 0700    Covid ordered, pt to be scheduled 5/2/2022.  Message sent to pt.

## 2022-01-27 ENCOUNTER — PATIENT MESSAGE (OUTPATIENT)
Dept: ADMINISTRATIVE | Facility: OTHER | Age: 32
End: 2022-01-27
Payer: COMMERCIAL

## 2022-02-10 ENCOUNTER — PATIENT MESSAGE (OUTPATIENT)
Dept: OBSTETRICS AND GYNECOLOGY | Facility: CLINIC | Age: 32
End: 2022-02-10

## 2022-02-10 ENCOUNTER — ROUTINE PRENATAL (OUTPATIENT)
Dept: OBSTETRICS AND GYNECOLOGY | Facility: CLINIC | Age: 32
End: 2022-02-10
Attending: OBSTETRICS & GYNECOLOGY
Payer: COMMERCIAL

## 2022-02-10 ENCOUNTER — LAB VISIT (OUTPATIENT)
Dept: LAB | Facility: OTHER | Age: 32
End: 2022-02-10
Attending: OBSTETRICS & GYNECOLOGY
Payer: COMMERCIAL

## 2022-02-10 VITALS
BODY MASS INDEX: 24.86 KG/M2 | WEIGHT: 140.31 LBS | SYSTOLIC BLOOD PRESSURE: 114 MMHG | DIASTOLIC BLOOD PRESSURE: 72 MMHG

## 2022-02-10 DIAGNOSIS — Z34.80 SUPERVISION OF OTHER NORMAL PREGNANCY, ANTEPARTUM: Primary | ICD-10-CM

## 2022-02-10 DIAGNOSIS — Z36.9 ANTENATAL SCREENING ENCOUNTER: ICD-10-CM

## 2022-02-10 LAB
ABO + RH BLD: NORMAL
BASOPHILS # BLD AUTO: 0.02 K/UL (ref 0–0.2)
BASOPHILS NFR BLD: 0.2 % (ref 0–1.9)
BLD GP AB SCN CELLS X3 SERPL QL: NORMAL
DIFFERENTIAL METHOD: ABNORMAL
EOSINOPHIL # BLD AUTO: 0 K/UL (ref 0–0.5)
EOSINOPHIL NFR BLD: 0.1 % (ref 0–8)
ERYTHROCYTE [DISTWIDTH] IN BLOOD BY AUTOMATED COUNT: 13 % (ref 11.5–14.5)
ERYTHROCYTE [DISTWIDTH] IN BLOOD BY AUTOMATED COUNT: 13 % (ref 11.5–14.5)
GLUCOSE SERPL-MCNC: 119 MG/DL (ref 70–140)
HCT VFR BLD AUTO: 31.8 % (ref 37–48.5)
HCT VFR BLD AUTO: 31.8 % (ref 37–48.5)
HGB BLD-MCNC: 10.9 G/DL (ref 12–16)
HGB BLD-MCNC: 10.9 G/DL (ref 12–16)
IMM GRANULOCYTES # BLD AUTO: 0.07 K/UL (ref 0–0.04)
IMM GRANULOCYTES NFR BLD AUTO: 0.6 % (ref 0–0.5)
LYMPHOCYTES # BLD AUTO: 1.7 K/UL (ref 1–4.8)
LYMPHOCYTES NFR BLD: 14.6 % (ref 18–48)
MCH RBC QN AUTO: 31.1 PG (ref 27–31)
MCH RBC QN AUTO: 31.1 PG (ref 27–31)
MCHC RBC AUTO-ENTMCNC: 34.3 G/DL (ref 32–36)
MCHC RBC AUTO-ENTMCNC: 34.3 G/DL (ref 32–36)
MCV RBC AUTO: 91 FL (ref 82–98)
MCV RBC AUTO: 91 FL (ref 82–98)
MONOCYTES # BLD AUTO: 0.8 K/UL (ref 0.3–1)
MONOCYTES NFR BLD: 7.3 % (ref 4–15)
NEUTROPHILS # BLD AUTO: 8.8 K/UL (ref 1.8–7.7)
NEUTROPHILS NFR BLD: 77.2 % (ref 38–73)
NRBC BLD-RTO: 0 /100 WBC
PLATELET # BLD AUTO: 234 K/UL (ref 150–450)
PLATELET # BLD AUTO: 234 K/UL (ref 150–450)
PMV BLD AUTO: 9.8 FL (ref 9.2–12.9)
PMV BLD AUTO: 9.8 FL (ref 9.2–12.9)
RBC # BLD AUTO: 3.51 M/UL (ref 4–5.4)
RBC # BLD AUTO: 3.51 M/UL (ref 4–5.4)
WBC # BLD AUTO: 11.37 K/UL (ref 3.9–12.7)
WBC # BLD AUTO: 11.37 K/UL (ref 3.9–12.7)

## 2022-02-10 PROCEDURE — 0502F SUBSEQUENT PRENATAL CARE: CPT | Mod: CPTII,S$GLB,, | Performed by: OBSTETRICS & GYNECOLOGY

## 2022-02-10 PROCEDURE — 86901 BLOOD TYPING SEROLOGIC RH(D): CPT | Performed by: OBSTETRICS & GYNECOLOGY

## 2022-02-10 PROCEDURE — 99999 PR PBB SHADOW E&M-EST. PATIENT-LVL II: ICD-10-PCS | Mod: PBBFAC,,, | Performed by: OBSTETRICS & GYNECOLOGY

## 2022-02-10 PROCEDURE — 82950 GLUCOSE TEST: CPT | Performed by: OBSTETRICS & GYNECOLOGY

## 2022-02-10 PROCEDURE — 86900 BLOOD TYPING SEROLOGIC ABO: CPT | Performed by: OBSTETRICS & GYNECOLOGY

## 2022-02-10 PROCEDURE — 85025 COMPLETE CBC W/AUTO DIFF WBC: CPT | Performed by: OBSTETRICS & GYNECOLOGY

## 2022-02-10 PROCEDURE — 36415 COLL VENOUS BLD VENIPUNCTURE: CPT | Performed by: OBSTETRICS & GYNECOLOGY

## 2022-02-10 PROCEDURE — 0502F PR SUBSEQUENT PRENATAL CARE: ICD-10-PCS | Mod: CPTII,S$GLB,, | Performed by: OBSTETRICS & GYNECOLOGY

## 2022-02-10 PROCEDURE — 99999 PR PBB SHADOW E&M-EST. PATIENT-LVL II: CPT | Mod: PBBFAC,,, | Performed by: OBSTETRICS & GYNECOLOGY

## 2022-02-17 ENCOUNTER — PATIENT MESSAGE (OUTPATIENT)
Dept: ADMINISTRATIVE | Facility: OTHER | Age: 32
End: 2022-02-17
Payer: COMMERCIAL

## 2022-02-21 ENCOUNTER — PATIENT MESSAGE (OUTPATIENT)
Dept: OBSTETRICS AND GYNECOLOGY | Facility: CLINIC | Age: 32
End: 2022-02-21
Payer: COMMERCIAL

## 2022-03-08 ENCOUNTER — ROUTINE PRENATAL (OUTPATIENT)
Dept: OBSTETRICS AND GYNECOLOGY | Facility: CLINIC | Age: 32
End: 2022-03-08
Attending: OBSTETRICS & GYNECOLOGY
Payer: COMMERCIAL

## 2022-03-08 ENCOUNTER — CLINICAL SUPPORT (OUTPATIENT)
Dept: OBSTETRICS AND GYNECOLOGY | Facility: CLINIC | Age: 32
End: 2022-03-08
Payer: COMMERCIAL

## 2022-03-08 VITALS
DIASTOLIC BLOOD PRESSURE: 72 MMHG | WEIGHT: 148.38 LBS | BODY MASS INDEX: 26.28 KG/M2 | SYSTOLIC BLOOD PRESSURE: 122 MMHG

## 2022-03-08 DIAGNOSIS — Z3A.30 30 WEEKS GESTATION OF PREGNANCY: Primary | ICD-10-CM

## 2022-03-08 DIAGNOSIS — Z23 NEED FOR TDAP VACCINATION: ICD-10-CM

## 2022-03-08 DIAGNOSIS — Z23 NEED FOR TDAP VACCINATION: Primary | ICD-10-CM

## 2022-03-08 PROCEDURE — 0502F SUBSEQUENT PRENATAL CARE: CPT | Mod: CPTII,S$GLB,, | Performed by: NURSE PRACTITIONER

## 2022-03-08 PROCEDURE — 99999 PR PBB SHADOW E&M-EST. PATIENT-LVL I: ICD-10-PCS | Mod: PBBFAC,,,

## 2022-03-08 PROCEDURE — 0502F PR SUBSEQUENT PRENATAL CARE: ICD-10-PCS | Mod: CPTII,S$GLB,, | Performed by: NURSE PRACTITIONER

## 2022-03-08 PROCEDURE — 99999 PR PBB SHADOW E&M-EST. PATIENT-LVL II: ICD-10-PCS | Mod: PBBFAC,,, | Performed by: NURSE PRACTITIONER

## 2022-03-08 PROCEDURE — 99999 PR PBB SHADOW E&M-EST. PATIENT-LVL II: CPT | Mod: PBBFAC,,, | Performed by: NURSE PRACTITIONER

## 2022-03-08 PROCEDURE — 99999 PR PBB SHADOW E&M-EST. PATIENT-LVL I: CPT | Mod: PBBFAC,,,

## 2022-03-08 NOTE — PROGRESS NOTES
Presents at 30w5d for LINDA. Excellent FM, denies VB, LOF, ctx. Tdap today. FU anatomy scan next week. FU in 2 weeks for LINDA.

## 2022-03-08 NOTE — PROGRESS NOTES
Ordering Provider: Marifer Nassar    Pt received Tdap to LEFT deltoid.  Pt tolerated injection well.  Pt was instructed to wait 15 minutes to monitor for signs and symptoms of any reactions.  Pt has no further questions, comments, or concerns at this time.

## 2022-03-14 ENCOUNTER — PATIENT MESSAGE (OUTPATIENT)
Dept: OBSTETRICS AND GYNECOLOGY | Facility: CLINIC | Age: 32
End: 2022-03-14
Payer: COMMERCIAL

## 2022-03-15 ENCOUNTER — PATIENT MESSAGE (OUTPATIENT)
Dept: OBSTETRICS AND GYNECOLOGY | Facility: CLINIC | Age: 32
End: 2022-03-15
Payer: COMMERCIAL

## 2022-03-16 ENCOUNTER — PATIENT MESSAGE (OUTPATIENT)
Dept: MATERNAL FETAL MEDICINE | Facility: CLINIC | Age: 32
End: 2022-03-16
Payer: COMMERCIAL

## 2022-03-17 ENCOUNTER — PROCEDURE VISIT (OUTPATIENT)
Dept: MATERNAL FETAL MEDICINE | Facility: CLINIC | Age: 32
End: 2022-03-17
Payer: COMMERCIAL

## 2022-03-17 ENCOUNTER — PATIENT MESSAGE (OUTPATIENT)
Dept: ADMINISTRATIVE | Facility: OTHER | Age: 32
End: 2022-03-17
Payer: COMMERCIAL

## 2022-03-17 DIAGNOSIS — Z36.89 ENCOUNTER FOR ULTRASOUND TO CHECK FETAL GROWTH: ICD-10-CM

## 2022-03-17 DIAGNOSIS — Z36.89 ULTRASOUND SCAN TO EVALUATE PLACENTA LOCATION: ICD-10-CM

## 2022-03-17 PROCEDURE — 76816 OB US FOLLOW-UP PER FETUS: CPT | Mod: S$GLB,,, | Performed by: OBSTETRICS & GYNECOLOGY

## 2022-03-17 PROCEDURE — 76817 US MFM PROCEDURE (VIEWPOINT): ICD-10-PCS | Mod: S$GLB,,, | Performed by: OBSTETRICS & GYNECOLOGY

## 2022-03-17 PROCEDURE — 76816 US MFM PROCEDURE (VIEWPOINT): ICD-10-PCS | Mod: S$GLB,,, | Performed by: OBSTETRICS & GYNECOLOGY

## 2022-03-17 PROCEDURE — 76817 TRANSVAGINAL US OBSTETRIC: CPT | Mod: S$GLB,,, | Performed by: OBSTETRICS & GYNECOLOGY

## 2022-03-23 ENCOUNTER — PATIENT MESSAGE (OUTPATIENT)
Dept: OBSTETRICS AND GYNECOLOGY | Facility: CLINIC | Age: 32
End: 2022-03-23
Payer: COMMERCIAL

## 2022-03-28 ENCOUNTER — PATIENT MESSAGE (OUTPATIENT)
Dept: OBSTETRICS AND GYNECOLOGY | Facility: CLINIC | Age: 32
End: 2022-03-28
Payer: COMMERCIAL

## 2022-03-30 ENCOUNTER — PATIENT MESSAGE (OUTPATIENT)
Dept: OBSTETRICS AND GYNECOLOGY | Facility: CLINIC | Age: 32
End: 2022-03-30
Payer: COMMERCIAL

## 2022-03-31 ENCOUNTER — ROUTINE PRENATAL (OUTPATIENT)
Dept: OBSTETRICS AND GYNECOLOGY | Facility: CLINIC | Age: 32
End: 2022-03-31
Attending: OBSTETRICS & GYNECOLOGY
Payer: COMMERCIAL

## 2022-03-31 ENCOUNTER — CLINICAL SUPPORT (OUTPATIENT)
Dept: OBSTETRICS AND GYNECOLOGY | Facility: CLINIC | Age: 32
End: 2022-03-31
Payer: COMMERCIAL

## 2022-03-31 ENCOUNTER — NURSE TRIAGE (OUTPATIENT)
Dept: ADMINISTRATIVE | Facility: CLINIC | Age: 32
End: 2022-03-31
Payer: COMMERCIAL

## 2022-03-31 ENCOUNTER — PATIENT MESSAGE (OUTPATIENT)
Dept: OBSTETRICS AND GYNECOLOGY | Facility: CLINIC | Age: 32
End: 2022-03-31
Payer: COMMERCIAL

## 2022-03-31 ENCOUNTER — LAB VISIT (OUTPATIENT)
Dept: LAB | Facility: OTHER | Age: 32
End: 2022-03-31
Attending: OBSTETRICS & GYNECOLOGY
Payer: COMMERCIAL

## 2022-03-31 VITALS
WEIGHT: 150.56 LBS | SYSTOLIC BLOOD PRESSURE: 160 MMHG | BODY MASS INDEX: 26.67 KG/M2 | DIASTOLIC BLOOD PRESSURE: 110 MMHG

## 2022-03-31 DIAGNOSIS — O16.3 ELEVATED BLOOD PRESSURE AFFECTING PREGNANCY IN THIRD TRIMESTER, ANTEPARTUM: Primary | ICD-10-CM

## 2022-03-31 DIAGNOSIS — O16.3 ELEVATED BLOOD PRESSURE AFFECTING PREGNANCY IN THIRD TRIMESTER, ANTEPARTUM: ICD-10-CM

## 2022-03-31 LAB
ALBUMIN SERPL BCP-MCNC: 2.8 G/DL (ref 3.5–5.2)
ALP SERPL-CCNC: 105 U/L (ref 55–135)
ALT SERPL W/O P-5'-P-CCNC: 12 U/L (ref 10–44)
ANION GAP SERPL CALC-SCNC: 13 MMOL/L (ref 8–16)
AST SERPL-CCNC: 16 U/L (ref 10–40)
BASOPHILS # BLD AUTO: 0.02 K/UL (ref 0–0.2)
BASOPHILS NFR BLD: 0.2 % (ref 0–1.9)
BILIRUB SERPL-MCNC: 0.3 MG/DL (ref 0.1–1)
BUN SERPL-MCNC: 5 MG/DL (ref 6–20)
CALCIUM SERPL-MCNC: 9 MG/DL (ref 8.7–10.5)
CHLORIDE SERPL-SCNC: 103 MMOL/L (ref 95–110)
CO2 SERPL-SCNC: 23 MMOL/L (ref 23–29)
CREAT SERPL-MCNC: 0.5 MG/DL (ref 0.5–1.4)
DIFFERENTIAL METHOD: ABNORMAL
EOSINOPHIL # BLD AUTO: 0 K/UL (ref 0–0.5)
EOSINOPHIL NFR BLD: 0.1 % (ref 0–8)
ERYTHROCYTE [DISTWIDTH] IN BLOOD BY AUTOMATED COUNT: 13.6 % (ref 11.5–14.5)
EST. GFR  (AFRICAN AMERICAN): >60 ML/MIN/1.73 M^2
EST. GFR  (NON AFRICAN AMERICAN): >60 ML/MIN/1.73 M^2
GLUCOSE SERPL-MCNC: 88 MG/DL (ref 70–110)
HCT VFR BLD AUTO: 32.7 % (ref 37–48.5)
HGB BLD-MCNC: 11.2 G/DL (ref 12–16)
IMM GRANULOCYTES # BLD AUTO: 0.07 K/UL (ref 0–0.04)
IMM GRANULOCYTES NFR BLD AUTO: 0.7 % (ref 0–0.5)
LYMPHOCYTES # BLD AUTO: 1.9 K/UL (ref 1–4.8)
LYMPHOCYTES NFR BLD: 18.5 % (ref 18–48)
MCH RBC QN AUTO: 31.7 PG (ref 27–31)
MCHC RBC AUTO-ENTMCNC: 34.3 G/DL (ref 32–36)
MCV RBC AUTO: 93 FL (ref 82–98)
MONOCYTES # BLD AUTO: 1.2 K/UL (ref 0.3–1)
MONOCYTES NFR BLD: 11.1 % (ref 4–15)
NEUTROPHILS # BLD AUTO: 7.2 K/UL (ref 1.8–7.7)
NEUTROPHILS NFR BLD: 69.4 % (ref 38–73)
NRBC BLD-RTO: 0 /100 WBC
PLATELET # BLD AUTO: 208 K/UL (ref 150–450)
PMV BLD AUTO: 10.5 FL (ref 9.2–12.9)
POTASSIUM SERPL-SCNC: 2.9 MMOL/L (ref 3.5–5.1)
PROT SERPL-MCNC: 6.6 G/DL (ref 6–8.4)
RBC # BLD AUTO: 3.53 M/UL (ref 4–5.4)
SODIUM SERPL-SCNC: 139 MMOL/L (ref 136–145)
URATE SERPL-MCNC: 2.6 MG/DL (ref 2.4–5.7)
WBC # BLD AUTO: 10.37 K/UL (ref 3.9–12.7)

## 2022-03-31 PROCEDURE — 99999 PR PBB SHADOW E&M-EST. PATIENT-LVL I: ICD-10-PCS | Mod: PBBFAC,,,

## 2022-03-31 PROCEDURE — 36415 COLL VENOUS BLD VENIPUNCTURE: CPT | Performed by: OBSTETRICS & GYNECOLOGY

## 2022-03-31 PROCEDURE — 0502F SUBSEQUENT PRENATAL CARE: CPT | Mod: CPTII,S$GLB,, | Performed by: OBSTETRICS & GYNECOLOGY

## 2022-03-31 PROCEDURE — 84550 ASSAY OF BLOOD/URIC ACID: CPT | Performed by: OBSTETRICS & GYNECOLOGY

## 2022-03-31 PROCEDURE — 99999 PR PBB SHADOW E&M-EST. PATIENT-LVL III: CPT | Mod: PBBFAC,,, | Performed by: OBSTETRICS & GYNECOLOGY

## 2022-03-31 PROCEDURE — 80053 COMPREHEN METABOLIC PANEL: CPT | Performed by: OBSTETRICS & GYNECOLOGY

## 2022-03-31 PROCEDURE — 85025 COMPLETE CBC W/AUTO DIFF WBC: CPT | Performed by: OBSTETRICS & GYNECOLOGY

## 2022-03-31 PROCEDURE — 0502F PR SUBSEQUENT PRENATAL CARE: ICD-10-PCS | Mod: CPTII,S$GLB,, | Performed by: OBSTETRICS & GYNECOLOGY

## 2022-03-31 PROCEDURE — 99999 PR PBB SHADOW E&M-EST. PATIENT-LVL III: ICD-10-PCS | Mod: PBBFAC,,, | Performed by: OBSTETRICS & GYNECOLOGY

## 2022-03-31 PROCEDURE — 96372 THER/PROPH/DIAG INJ SC/IM: CPT | Mod: S$GLB,,, | Performed by: OBSTETRICS & GYNECOLOGY

## 2022-03-31 PROCEDURE — 96372 PR INJECTION,THERAP/PROPH/DIAG2ST, IM OR SUBCUT: ICD-10-PCS | Mod: S$GLB,,, | Performed by: OBSTETRICS & GYNECOLOGY

## 2022-03-31 PROCEDURE — 99999 PR PBB SHADOW E&M-EST. PATIENT-LVL I: CPT | Mod: PBBFAC,,,

## 2022-03-31 RX ORDER — BETAMETHASONE SODIUM PHOSPHATE AND BETAMETHASONE ACETATE 3; 3 MG/ML; MG/ML
12 INJECTION, SUSPENSION INTRA-ARTICULAR; INTRALESIONAL; INTRAMUSCULAR; SOFT TISSUE
Status: COMPLETED | OUTPATIENT
Start: 2022-03-31 | End: 2022-03-31

## 2022-03-31 RX ADMIN — BETAMETHASONE SODIUM PHOSPHATE AND BETAMETHASONE ACETATE 12 MG: 3; 3 INJECTION, SUSPENSION INTRA-ARTICULAR; INTRALESIONAL; INTRAMUSCULAR; SOFT TISSUE at 11:03

## 2022-03-31 NOTE — PROGRESS NOTES
Ordering Provider: Dr. Mejia     Patient here to receive Betamethasone 12 mg to LEFT upper outer glute. Tolerated well, no reaction noted. Instructed to wait 15 minutes after administration for monitoring.      Pre pain scale: none     Post pain scale: none

## 2022-03-31 NOTE — PROGRESS NOTES
Repeat /108, asymptomatic. Minimal fluid weight gain. No h/o PreE with first baby. Will check PreE labs today and f/u tomorrow with BP check and second steroid shot  Cervix closed/70/-3  PreE precautions given when to go to [facility]

## 2022-04-01 ENCOUNTER — CLINICAL SUPPORT (OUTPATIENT)
Dept: OBSTETRICS AND GYNECOLOGY | Facility: CLINIC | Age: 32
End: 2022-04-01
Payer: COMMERCIAL

## 2022-04-01 ENCOUNTER — ROUTINE PRENATAL (OUTPATIENT)
Dept: OBSTETRICS AND GYNECOLOGY | Facility: CLINIC | Age: 32
End: 2022-04-01
Attending: OBSTETRICS & GYNECOLOGY
Payer: COMMERCIAL

## 2022-04-01 ENCOUNTER — HOSPITAL ENCOUNTER (EMERGENCY)
Facility: OTHER | Age: 32
Discharge: HOME | End: 2022-04-01
Attending: OBSTETRICS & GYNECOLOGY
Payer: COMMERCIAL

## 2022-04-01 VITALS
SYSTOLIC BLOOD PRESSURE: 120 MMHG | WEIGHT: 149.69 LBS | BODY MASS INDEX: 26.52 KG/M2 | DIASTOLIC BLOOD PRESSURE: 80 MMHG

## 2022-04-01 VITALS
RESPIRATION RATE: 19 BRPM | DIASTOLIC BLOOD PRESSURE: 93 MMHG | HEART RATE: 96 BPM | TEMPERATURE: 98 F | OXYGEN SATURATION: 99 % | SYSTOLIC BLOOD PRESSURE: 134 MMHG

## 2022-04-01 DIAGNOSIS — O13.3 GESTATIONAL HYPERTENSION, THIRD TRIMESTER: Primary | ICD-10-CM

## 2022-04-01 DIAGNOSIS — O16.3 ELEVATED BLOOD PRESSURE AFFECTING PREGNANCY IN THIRD TRIMESTER, ANTEPARTUM: Primary | ICD-10-CM

## 2022-04-01 DIAGNOSIS — Z3A.34 34 WEEKS GESTATION OF PREGNANCY: ICD-10-CM

## 2022-04-01 LAB
ALBUMIN SERPL BCP-MCNC: 2.7 G/DL (ref 3.5–5.2)
ALP SERPL-CCNC: 86 U/L (ref 55–135)
ALT SERPL W/O P-5'-P-CCNC: 12 U/L (ref 10–44)
ANION GAP SERPL CALC-SCNC: 14 MMOL/L (ref 8–16)
AST SERPL-CCNC: 11 U/L (ref 10–40)
BASOPHILS # BLD AUTO: 0.02 K/UL (ref 0–0.2)
BASOPHILS NFR BLD: 0.2 % (ref 0–1.9)
BILIRUB SERPL-MCNC: 0.2 MG/DL (ref 0.1–1)
BUN SERPL-MCNC: 6 MG/DL (ref 6–20)
CALCIUM SERPL-MCNC: 9 MG/DL (ref 8.7–10.5)
CHLORIDE SERPL-SCNC: 103 MMOL/L (ref 95–110)
CO2 SERPL-SCNC: 20 MMOL/L (ref 23–29)
CREAT SERPL-MCNC: 0.6 MG/DL (ref 0.5–1.4)
CREAT UR-MCNC: 11.2 MG/DL (ref 15–325)
DIFFERENTIAL METHOD: ABNORMAL
EOSINOPHIL # BLD AUTO: 0 K/UL (ref 0–0.5)
EOSINOPHIL NFR BLD: 0 % (ref 0–8)
ERYTHROCYTE [DISTWIDTH] IN BLOOD BY AUTOMATED COUNT: 13.2 % (ref 11.5–14.5)
EST. GFR  (AFRICAN AMERICAN): >60 ML/MIN/1.73 M^2
EST. GFR  (NON AFRICAN AMERICAN): >60 ML/MIN/1.73 M^2
GLUCOSE SERPL-MCNC: 145 MG/DL (ref 70–110)
HCT VFR BLD AUTO: 29.3 % (ref 37–48.5)
HGB BLD-MCNC: 10.5 G/DL (ref 12–16)
IMM GRANULOCYTES # BLD AUTO: 0.13 K/UL (ref 0–0.04)
IMM GRANULOCYTES NFR BLD AUTO: 1 % (ref 0–0.5)
LYMPHOCYTES # BLD AUTO: 1.5 K/UL (ref 1–4.8)
LYMPHOCYTES NFR BLD: 11.5 % (ref 18–48)
MCH RBC QN AUTO: 32.2 PG (ref 27–31)
MCHC RBC AUTO-ENTMCNC: 35.8 G/DL (ref 32–36)
MCV RBC AUTO: 90 FL (ref 82–98)
MONOCYTES # BLD AUTO: 1.7 K/UL (ref 0.3–1)
MONOCYTES NFR BLD: 13.8 % (ref 4–15)
NEUTROPHILS # BLD AUTO: 9.3 K/UL (ref 1.8–7.7)
NEUTROPHILS NFR BLD: 73.5 % (ref 38–73)
NRBC BLD-RTO: 0 /100 WBC
PLATELET # BLD AUTO: 201 K/UL (ref 150–450)
PMV BLD AUTO: 10.2 FL (ref 9.2–12.9)
POTASSIUM SERPL-SCNC: 3 MMOL/L (ref 3.5–5.1)
PROT SERPL-MCNC: 6.5 G/DL (ref 6–8.4)
PROT UR-MCNC: <7 MG/DL (ref 0–15)
PROT/CREAT UR: ABNORMAL MG/G{CREAT} (ref 0–0.2)
RBC # BLD AUTO: 3.26 M/UL (ref 4–5.4)
SODIUM SERPL-SCNC: 137 MMOL/L (ref 136–145)
WBC # BLD AUTO: 12.6 K/UL (ref 3.9–12.7)

## 2022-04-01 PROCEDURE — 80053 COMPREHEN METABOLIC PANEL: CPT | Performed by: STUDENT IN AN ORGANIZED HEALTH CARE EDUCATION/TRAINING PROGRAM

## 2022-04-01 PROCEDURE — 59025 FETAL NON-STRESS TEST: CPT

## 2022-04-01 PROCEDURE — 96372 THER/PROPH/DIAG INJ SC/IM: CPT | Mod: S$GLB,,, | Performed by: OBSTETRICS & GYNECOLOGY

## 2022-04-01 PROCEDURE — 59025 PR FETAL 2N-STRESS TEST: ICD-10-PCS | Mod: 26,,, | Performed by: OBSTETRICS & GYNECOLOGY

## 2022-04-01 PROCEDURE — 99999 PR PBB SHADOW E&M-EST. PATIENT-LVL II: CPT | Mod: PBBFAC,,,

## 2022-04-01 PROCEDURE — 85025 COMPLETE CBC W/AUTO DIFF WBC: CPT | Performed by: STUDENT IN AN ORGANIZED HEALTH CARE EDUCATION/TRAINING PROGRAM

## 2022-04-01 PROCEDURE — 0502F SUBSEQUENT PRENATAL CARE: CPT | Mod: CPTII,S$GLB,, | Performed by: OBSTETRICS & GYNECOLOGY

## 2022-04-01 PROCEDURE — 99285 EMERGENCY DEPT VISIT HI MDM: CPT | Mod: 25

## 2022-04-01 PROCEDURE — 99999 PR PBB SHADOW E&M-EST. PATIENT-LVL II: ICD-10-PCS | Mod: PBBFAC,,,

## 2022-04-01 PROCEDURE — 99284 PR EMERGENCY DEPT VISIT,LEVEL IV: ICD-10-PCS | Mod: 25,,, | Performed by: OBSTETRICS & GYNECOLOGY

## 2022-04-01 PROCEDURE — 82570 ASSAY OF URINE CREATININE: CPT | Performed by: STUDENT IN AN ORGANIZED HEALTH CARE EDUCATION/TRAINING PROGRAM

## 2022-04-01 PROCEDURE — 99999 PR PBB SHADOW E&M-EST. PATIENT-LVL II: ICD-10-PCS | Mod: PBBFAC,,, | Performed by: OBSTETRICS & GYNECOLOGY

## 2022-04-01 PROCEDURE — 59025 FETAL NON-STRESS TEST: CPT | Mod: 26,,, | Performed by: OBSTETRICS & GYNECOLOGY

## 2022-04-01 PROCEDURE — 99284 EMERGENCY DEPT VISIT MOD MDM: CPT | Mod: 25,,, | Performed by: OBSTETRICS & GYNECOLOGY

## 2022-04-01 PROCEDURE — 96372 PR INJECTION,THERAP/PROPH/DIAG2ST, IM OR SUBCUT: ICD-10-PCS | Mod: S$GLB,,, | Performed by: OBSTETRICS & GYNECOLOGY

## 2022-04-01 PROCEDURE — 0502F PR SUBSEQUENT PRENATAL CARE: ICD-10-PCS | Mod: CPTII,S$GLB,, | Performed by: OBSTETRICS & GYNECOLOGY

## 2022-04-01 PROCEDURE — 99999 PR PBB SHADOW E&M-EST. PATIENT-LVL II: CPT | Mod: PBBFAC,,, | Performed by: OBSTETRICS & GYNECOLOGY

## 2022-04-01 RX ORDER — BETAMETHASONE SODIUM PHOSPHATE AND BETAMETHASONE ACETATE 3; 3 MG/ML; MG/ML
12 INJECTION, SUSPENSION INTRA-ARTICULAR; INTRALESIONAL; INTRAMUSCULAR; SOFT TISSUE
Status: COMPLETED | OUTPATIENT
Start: 2022-04-01 | End: 2022-04-01

## 2022-04-01 RX ORDER — POTASSIUM CHLORIDE 20 MEQ/1
20 TABLET, EXTENDED RELEASE ORAL DAILY
Qty: 30 TABLET | Refills: 0 | Status: SHIPPED | OUTPATIENT
Start: 2022-04-01 | End: 2022-05-03

## 2022-04-01 RX ADMIN — BETAMETHASONE SODIUM PHOSPHATE AND BETAMETHASONE ACETATE 12 MG: 3; 3 INJECTION, SUSPENSION INTRA-ARTICULAR; INTRALESIONAL; INTRAMUSCULAR; SOFT TISSUE at 01:04

## 2022-04-01 NOTE — PROGRESS NOTES
Here for BP check and second steroid injections. Went to [facility] last night for arm tingling. Feels better today. BP there was mild range. Today normal in clinic. Precautions explained.

## 2022-04-01 NOTE — DISCHARGE INSTRUCTIONS
Please attend all remaining prenatal visits. Please call or visit your provider, the OB clinic, or the OB ED at [phone] if you have a increased vaginal discharge, vaginal bleeding, contractions that are 2-5 minutes apart for two hours, decreased fetal movement (less than 10 kicks in two hours), a blood pressure elevated at or above 160/110, a headache unrelieved by tylenol, spots or blurry vision, and pain in your right upper abdomen.

## 2022-04-01 NOTE — TELEPHONE ENCOUNTER
Pt reports since about 3-4pm today has been having constant tingling from below her chin down, pt states she took her BP and it is 141/81, but she did have a really high BP in office not too long ago, 160/110. Pt states she had some blood work done and her BP went down, so she just monitors it for now. Pt denies any swelling or headache, Pt advised a call will be placed to an on call MD per protocol, Dr. Melanie Kraft contacted and advised for pt to be seen in [facility] for evaluation. Pt encouraged to call back with any worsening symptoms or questions and verbalized understanding.    Reason for Disposition   Nursing judgment or information in reference    Additional Information   Negative: [1] SEVERE weakness (i.e., unable to walk or barely able to walk, requires support) AND [2] new onset or worsening   Negative: [1] Weakness (i.e., paralysis, loss of muscle strength) of the face, arm / hand, or leg / foot on one side of the body AND [2] sudden onset AND [3] present now   Negative: [1] Numbness (i.e., loss of sensation) of the face, arm / hand, or leg / foot on one side of the body AND [2] sudden onset AND [3] present now   Negative: [1] Loss of speech or garbled speech AND [2] sudden onset AND [3] present now   Negative: Difficult to awaken or acting confused  (e.g., disoriented, slurred speech)   Negative: Sounds like a life-threatening emergency to the triager   Negative: Headache  (and neurologic deficit)   Negative: [1] Back pain AND [2] numbness (loss of sensation) in groin or rectal area   Negative: [1] Unable to urinate (or only a few drops) > 4 hours AND [2] bladder feels very full (e.g., palpable bladder or strong urge to urinate)   Negative: [1] Loss of control of bowel or bladder (i.e., incontinence) AND [2] new onset   Negative: [1] Weakness (i.e., paralysis, loss of muscle strength) of the face, arm / hand, or leg / foot on one side of the body AND [2] sudden onset AND [3] brief (now  gone)   Negative: [1] Numbness (i.e., loss of sensation) of the face, arm / hand, or leg / foot on one side of the body AND [2] sudden onset AND [3] brief (now gone)   Negative: [1] Loss of speech or garbled speech AND [2] sudden onset AND [3] brief (now gone)   Negative: Bell's palsy suspected (i.e., weakness on only one side of the face, developing over hours to days, no other symptoms)   Negative: Patient sounds very sick or weak to the triager   Negative: Neck pain  (and neurologic deficit)   Negative: Back pain  (and neurologic deficit)   Negative: [1] Weakness of the face, arm / hand, or leg / foot on one side of the body AND [2] gradual onset (e.g., days to weeks) AND [3] present now   Negative: [1] Numbness (i.e., loss of sensation) of the face, arm / hand, or leg / foot on one side of the body AND [2] gradual onset (e.g., days to weeks) AND [3] present now   Negative: [1] Loss of speech or garbled speech AND [2] gradual onset (e.g., days to weeks) AND [3] present now   Negative: [1] Tingling (e.g., pins and needles) of the face, arm / hand, or leg / foot on one side of the body AND [2] present now   Negative: [1] Loss of speech or garbled speech AND [2] is a chronic symptom (recurrent or ongoing AND present > 4 weeks)   Negative: [1] Weakness of arm / hand, or leg / foot AND [2] is a chronic symptom (recurrent or ongoing AND present > 4 weeks)   Negative: [1] Numbness or tingling in one or both hands AND [2] is a chronic symptom (recurrent or ongoing AND present > 4 weeks)   Negative: [1] Numbness or tingling in one or both feet AND [2] is a chronic symptom (recurrent or ongoing AND present > 4 weeks)   Negative: [1] Tingling in hand (e.g., pins and needles) AND [2] after prolonged laying on arm AND [3]  brief (now gone)   Negative: [1] Tingling in foot (e.g., pins and needles) AND [2] after prolonged sitting AND [3] brief (now gone)   Negative: [1] Bumped elbow AND [2] brief (now gone)  numbness (or tingling or burning) in hand and fingers    Protocols used: NO GUIDELINE [id number], ST NEUROLOGIC DEFICIT-A-AH

## 2022-04-01 NOTE — ED PROVIDER NOTES
Encounter Date: 2022       History     Chief Complaint   Patient presents with    Hypertension     HPI     Earlene Nassar is a 31 y.o. [id number] at 34w1d presents complaining of numbness in her hands. She was seen in clinic today with Dr. Mejia and had a severe range blood pressure of 160/110 with repeat 144/108. Noted minimal weight gain. In [facility], she declines headache, visual changes, CP, SOB, RUQ pain. Denies any blood pressure issues in her prior pregnancy. She received BMZ in clinic this morning around 11 am.     This IUP is complicated by history of 4th degree laceration in prior .     Patient denies contractions, denies vaginal bleeding, denies LOF.   Fetal Movement: normal.     Review of patient's allergies indicates:   Allergen Reactions    Codeine Other (See Comments)     Per pt's mother she had rash as a child but took narcotics after Tonsillectomy without problems     Past Medical History:   Diagnosis Date    Anxiety     Xanax prn    Asthma due to environmental allergies     Xyzal     Past Surgical History:   Procedure Laterality Date    ADENOIDECTOMY      TONSILLECTOMY       Family History   Problem Relation Age of Onset    No Known Problems Mother     Heart disease Father     No Known Problems Maternal Grandmother     No Known Problems Maternal Grandfather     No Known Problems Paternal Grandmother     No Known Problems Paternal Grandfather     No Known Problems Sister     Breast cancer Neg Hx     Colon cancer Neg Hx     Ovarian cancer Neg Hx     Cervical cancer Neg Hx     Endometrial cancer Neg Hx     Vaginal cancer Neg Hx      Social History     Tobacco Use    Smoking status: Never Smoker    Smokeless tobacco: Never Used   Substance Use Topics    Alcohol use: Yes     Comment: occasionally    Drug use: No     Review of Systems   Constitutional: Negative for fever.   HENT: Negative for sore throat.    Eyes: Negative for visual disturbance.   Respiratory: Negative for  shortness of breath.    Cardiovascular: Negative for chest pain.   Gastrointestinal: Negative for abdominal pain and nausea.   Genitourinary: Negative for dysuria, pelvic pain, vaginal bleeding, vaginal discharge and vaginal pain.   Musculoskeletal: Negative for back pain.   Skin: Negative for rash.   Neurological: Negative for weakness and headaches.   Hematological: Does not bruise/bleed easily.       Physical Exam     Initial Vitals [04/01/22 0054]   BP Pulse Resp Temp SpO2   (!) 142/94 104 19 97.5 °F (36.4 °C) 98 %      MAP       --           BP:   142/94  139/92  134/93  141/85    Physical Exam    Nursing note and vitals reviewed.  Constitutional: Vital signs are normal. She appears well-developed and well-nourished. Breathing: Normal.   HENT:   Head: Normocephalic and atraumatic.   Eyes: EOM are normal.   Neck:   Normal range of motion.  Cardiovascular: Normal rate, intact distal pulses and normal pulses.   Pulmonary/Chest:   Normal work of breathing   Abdominal: Abdomen is soft. She exhibits no distension. There is no abdominal tenderness.   Musculoskeletal:      Cervical back: Normal range of motion.     Neurological: She is alert and oriented to person, place, and time. She has normal strength.   Skin: Skin is warm and dry.   Psychiatric: Her behavior is normal.         ED Course   Obtain Fetal nonstress test (NST)    Date/Time: 4/1/2022 1:18 AM  Performed by: Rachele Penn MD  Authorized by: Rachele Penn MD     Nonstress Test:     Variability:  6-25 BPM    Decelerations:  None    Accelerations:  15 bpm    Baseline:  135    Contractions:  Not present  Biophysical Profile:     Nonstress Test Interpretation: reactive      Overall Impression:  Reassuring  Post-procedure:     Patient tolerance:  Patient tolerated the procedure well with no immediate complications      Labs Reviewed   PROTEIN / CREATININE RATIO, URINE - Abnormal; Notable for the following components:       Result Value     Creatinine, Urine 11.2 (*)     All other components within normal limits    Narrative:     Specimen Source->Urine   CBC W/ AUTO DIFFERENTIAL - Abnormal; Notable for the following components:    RBC 3.26 (*)     Hemoglobin 10.5 (*)     Hematocrit 29.3 (*)     MCH 32.2 (*)     Immature Granulocytes 1.0 (*)     Gran # (ANC) 9.3 (*)     Immature Grans (Abs) 0.13 (*)     Mono # 1.7 (*)     Gran % 73.5 (*)     Lymph % 11.5 (*)     All other components within normal limits   COMPREHENSIVE METABOLIC PANEL - Abnormal; Notable for the following components:    Potassium 3.0 (*)     CO2 20 (*)     Glucose 145 (*)     Albumin 2.7 (*)     All other components within normal limits          Imaging Results    None          Medications - No data to display  Medical Decision Making:   ED Management:  Mild range blood pressure  BP q15m  CBC and CMP earlier in the day  Repeat now and PC ratio  PC ratio TLTC  Platelet 208 > 201  Cr 0.5 > 0.6  AST/ALT 16/12> 11/12  Four blood pressures taken all mild range  BP:   142/94  139/92  134/93  141/85  Discussed hypertensive disorders of pregnancy with the patient and her mother. Discussed diagnosis of gestational hypertension and recommendation for delivery ( due to her history) at 37 weeks gestation  Discussed difference between chronic hypertension, gestational hypertension, pre-eclampsia, pre-eclampsia with SF.Discussed all severe features with patient and that she had a severe range blood pressure in clinic today. If another severe range blood pressure, would recommend delivery after 34 weeks which would be at the time of that next blood pressure. Discussed having bags packed in case this occurs.   Given strict ED precautions  Discussed following up with Dr. Mejia in clinic tomorrow still for BP check and second betamethasone.   Other:   I have discussed this case with another health care provider.            Attending Attestation:   Physician Attestation Statement for  Resident:  As the supervising MD   Physician Attestation Statement: I have personally seen and examined this patient.   I agree with the above history. -:   As the supervising MD I agree with the above PE.    As the supervising MD I agree with the above treatment, course, plan, and disposition.   -:   BP mild range, overall asymptomatic.  Labs wnl, except potassium 3.  Will send home with PO supplementation.  Discussed gHTN, all questions answered.  F/U with primary OB 4/1 as scheduled.  Strict return precautions given.      I was personally present during the critical portions of the procedure(s) performed by the resident and was immediately available in the ED to provide services and assistance as needed during the entire procedure.                          Clinical Impression:   Final diagnoses:  [O13.3] Gestational hypertension, third trimester (Primary)  [Z3A.34] 34 weeks gestation of pregnancy       Mahesh Penn MD  OBGYN PGY-3            Rachele Penn MD  Resident  04/01/22 0146       Melanie Kraft MD  04/01/22 0216

## 2022-04-01 NOTE — PROGRESS NOTES
4/1/2022  Betamethasone #2   12 mg administered IM to the Right upper outer Glut. Pt tolerated well.     Ordering Provider: Dr. KATERINA Mejia    There were no complaints prior to and following the administration of the medication.

## 2022-04-06 ENCOUNTER — PROCEDURE VISIT (OUTPATIENT)
Dept: OBSTETRICS AND GYNECOLOGY | Facility: CLINIC | Age: 32
End: 2022-04-06
Attending: OBSTETRICS & GYNECOLOGY
Payer: COMMERCIAL

## 2022-04-06 ENCOUNTER — LAB VISIT (OUTPATIENT)
Dept: LAB | Facility: OTHER | Age: 32
End: 2022-04-06
Attending: OBSTETRICS & GYNECOLOGY
Payer: COMMERCIAL

## 2022-04-06 VITALS
SYSTOLIC BLOOD PRESSURE: 130 MMHG | WEIGHT: 149.69 LBS | DIASTOLIC BLOOD PRESSURE: 90 MMHG | BODY MASS INDEX: 26.52 KG/M2

## 2022-04-06 DIAGNOSIS — I10 CHRONIC HYPERTENSION: ICD-10-CM

## 2022-04-06 DIAGNOSIS — I10 CHRONIC HYPERTENSION: Primary | ICD-10-CM

## 2022-04-06 LAB
ALBUMIN SERPL BCP-MCNC: 2.8 G/DL (ref 3.5–5.2)
ALP SERPL-CCNC: 100 U/L (ref 55–135)
ALT SERPL W/O P-5'-P-CCNC: 10 U/L (ref 10–44)
ANION GAP SERPL CALC-SCNC: 9 MMOL/L (ref 8–16)
AST SERPL-CCNC: 10 U/L (ref 10–40)
BASOPHILS # BLD AUTO: 0.02 K/UL (ref 0–0.2)
BASOPHILS NFR BLD: 0.1 % (ref 0–1.9)
BILIRUB SERPL-MCNC: 0.2 MG/DL (ref 0.1–1)
BUN SERPL-MCNC: 7 MG/DL (ref 6–20)
CALCIUM SERPL-MCNC: 9.4 MG/DL (ref 8.7–10.5)
CHLORIDE SERPL-SCNC: 104 MMOL/L (ref 95–110)
CO2 SERPL-SCNC: 26 MMOL/L (ref 23–29)
CREAT SERPL-MCNC: 0.6 MG/DL (ref 0.5–1.4)
DIFFERENTIAL METHOD: ABNORMAL
EOSINOPHIL # BLD AUTO: 0.1 K/UL (ref 0–0.5)
EOSINOPHIL NFR BLD: 0.4 % (ref 0–8)
ERYTHROCYTE [DISTWIDTH] IN BLOOD BY AUTOMATED COUNT: 13.2 % (ref 11.5–14.5)
EST. GFR  (AFRICAN AMERICAN): >60 ML/MIN/1.73 M^2
EST. GFR  (NON AFRICAN AMERICAN): >60 ML/MIN/1.73 M^2
GLUCOSE SERPL-MCNC: 142 MG/DL (ref 70–110)
HCT VFR BLD AUTO: 32.7 % (ref 37–48.5)
HGB BLD-MCNC: 11.3 G/DL (ref 12–16)
IMM GRANULOCYTES # BLD AUTO: 0.14 K/UL (ref 0–0.04)
IMM GRANULOCYTES NFR BLD AUTO: 1 % (ref 0–0.5)
LYMPHOCYTES # BLD AUTO: 3 K/UL (ref 1–4.8)
LYMPHOCYTES NFR BLD: 20.8 % (ref 18–48)
MCH RBC QN AUTO: 31.4 PG (ref 27–31)
MCHC RBC AUTO-ENTMCNC: 34.6 G/DL (ref 32–36)
MCV RBC AUTO: 91 FL (ref 82–98)
MONOCYTES # BLD AUTO: 1.8 K/UL (ref 0.3–1)
MONOCYTES NFR BLD: 12.4 % (ref 4–15)
NEUTROPHILS # BLD AUTO: 9.5 K/UL (ref 1.8–7.7)
NEUTROPHILS NFR BLD: 65.3 % (ref 38–73)
NRBC BLD-RTO: 0 /100 WBC
PLATELET # BLD AUTO: 266 K/UL (ref 150–450)
PMV BLD AUTO: 10.6 FL (ref 9.2–12.9)
POTASSIUM SERPL-SCNC: 3.6 MMOL/L (ref 3.5–5.1)
PROT SERPL-MCNC: 6.7 G/DL (ref 6–8.4)
RBC # BLD AUTO: 3.6 M/UL (ref 4–5.4)
SODIUM SERPL-SCNC: 139 MMOL/L (ref 136–145)
WBC # BLD AUTO: 14.62 K/UL (ref 3.9–12.7)

## 2022-04-06 PROCEDURE — 99999 PR PBB SHADOW E&M-EST. PATIENT-LVL III: ICD-10-PCS | Mod: PBBFAC,,, | Performed by: OBSTETRICS & GYNECOLOGY

## 2022-04-06 PROCEDURE — 99999 PR PBB SHADOW E&M-EST. PATIENT-LVL III: CPT | Mod: PBBFAC,,, | Performed by: OBSTETRICS & GYNECOLOGY

## 2022-04-06 PROCEDURE — 80053 COMPREHEN METABOLIC PANEL: CPT | Performed by: OBSTETRICS & GYNECOLOGY

## 2022-04-06 PROCEDURE — 76816 OB US FOLLOW-UP PER FETUS: CPT | Mod: S$GLB,,, | Performed by: OBSTETRICS & GYNECOLOGY

## 2022-04-06 PROCEDURE — 36415 COLL VENOUS BLD VENIPUNCTURE: CPT | Performed by: OBSTETRICS & GYNECOLOGY

## 2022-04-06 PROCEDURE — 76819 FETAL BIOPHYS PROFIL W/O NST: CPT | Mod: S$GLB,,, | Performed by: OBSTETRICS & GYNECOLOGY

## 2022-04-06 PROCEDURE — 76819 US OB/GYN EXTENDED PROCEDURE (VIEWPOINT): ICD-10-PCS | Mod: S$GLB,,, | Performed by: OBSTETRICS & GYNECOLOGY

## 2022-04-06 PROCEDURE — 0502F PR SUBSEQUENT PRENATAL CARE: ICD-10-PCS | Mod: CPTII,S$GLB,, | Performed by: OBSTETRICS & GYNECOLOGY

## 2022-04-06 PROCEDURE — 85025 COMPLETE CBC W/AUTO DIFF WBC: CPT | Performed by: OBSTETRICS & GYNECOLOGY

## 2022-04-06 PROCEDURE — 76816 US OB/GYN EXTENDED PROCEDURE (VIEWPOINT): ICD-10-PCS | Mod: S$GLB,,, | Performed by: OBSTETRICS & GYNECOLOGY

## 2022-04-06 PROCEDURE — 0502F SUBSEQUENT PRENATAL CARE: CPT | Mod: CPTII,S$GLB,, | Performed by: OBSTETRICS & GYNECOLOGY

## 2022-04-07 ENCOUNTER — PATIENT MESSAGE (OUTPATIENT)
Dept: OBSTETRICS AND GYNECOLOGY | Facility: CLINIC | Age: 32
End: 2022-04-07
Payer: COMMERCIAL

## 2022-04-07 ENCOUNTER — TELEPHONE (OUTPATIENT)
Dept: OBSTETRICS AND GYNECOLOGY | Facility: CLINIC | Age: 32
End: 2022-04-07
Payer: COMMERCIAL

## 2022-04-07 ENCOUNTER — HOSPITAL ENCOUNTER (EMERGENCY)
Facility: OTHER | Age: 32
Discharge: HOME | End: 2022-04-07
Attending: OBSTETRICS & GYNECOLOGY
Payer: COMMERCIAL

## 2022-04-07 VITALS
RESPIRATION RATE: 16 BRPM | WEIGHT: 149 LBS | TEMPERATURE: 98 F | HEART RATE: 104 BPM | DIASTOLIC BLOOD PRESSURE: 91 MMHG | BODY MASS INDEX: 26.4 KG/M2 | SYSTOLIC BLOOD PRESSURE: 129 MMHG | HEIGHT: 63 IN | OXYGEN SATURATION: 97 %

## 2022-04-07 DIAGNOSIS — Z3A.35 35 WEEKS GESTATION OF PREGNANCY: ICD-10-CM

## 2022-04-07 DIAGNOSIS — O14.93 PREECLAMPSIA, THIRD TRIMESTER: Primary | ICD-10-CM

## 2022-04-07 LAB
ALBUMIN SERPL BCP-MCNC: 2.7 G/DL (ref 3.5–5.2)
ALP SERPL-CCNC: 98 U/L (ref 55–135)
ALT SERPL W/O P-5'-P-CCNC: 11 U/L (ref 10–44)
ANION GAP SERPL CALC-SCNC: 11 MMOL/L (ref 8–16)
AST SERPL-CCNC: 9 U/L (ref 10–40)
BASOPHILS # BLD AUTO: 0.03 K/UL (ref 0–0.2)
BASOPHILS NFR BLD: 0.2 % (ref 0–1.9)
BILIRUB SERPL-MCNC: 0.3 MG/DL (ref 0.1–1)
BUN SERPL-MCNC: 6 MG/DL (ref 6–20)
CALCIUM SERPL-MCNC: 9.4 MG/DL (ref 8.7–10.5)
CHLORIDE SERPL-SCNC: 104 MMOL/L (ref 95–110)
CO2 SERPL-SCNC: 21 MMOL/L (ref 23–29)
CREAT SERPL-MCNC: 0.5 MG/DL (ref 0.5–1.4)
CREAT UR-MCNC: 23 MG/DL (ref 15–325)
DIFFERENTIAL METHOD: ABNORMAL
EOSINOPHIL # BLD AUTO: 0.1 K/UL (ref 0–0.5)
EOSINOPHIL NFR BLD: 0.5 % (ref 0–8)
ERYTHROCYTE [DISTWIDTH] IN BLOOD BY AUTOMATED COUNT: 13 % (ref 11.5–14.5)
EST. GFR  (AFRICAN AMERICAN): >60 ML/MIN/1.73 M^2
EST. GFR  (NON AFRICAN AMERICAN): >60 ML/MIN/1.73 M^2
GLUCOSE SERPL-MCNC: 100 MG/DL (ref 70–110)
HCT VFR BLD AUTO: 32.6 % (ref 37–48.5)
HGB BLD-MCNC: 11.3 G/DL (ref 12–16)
IMM GRANULOCYTES # BLD AUTO: 0.13 K/UL (ref 0–0.04)
IMM GRANULOCYTES NFR BLD AUTO: 0.9 % (ref 0–0.5)
INFLUENZA A, MOLECULAR: NEGATIVE
INFLUENZA B, MOLECULAR: NEGATIVE
LYMPHOCYTES # BLD AUTO: 3.4 K/UL (ref 1–4.8)
LYMPHOCYTES NFR BLD: 24.1 % (ref 18–48)
MCH RBC QN AUTO: 31.2 PG (ref 27–31)
MCHC RBC AUTO-ENTMCNC: 34.7 G/DL (ref 32–36)
MCV RBC AUTO: 90 FL (ref 82–98)
MONOCYTES # BLD AUTO: 1.8 K/UL (ref 0.3–1)
MONOCYTES NFR BLD: 13.2 % (ref 4–15)
NEUTROPHILS # BLD AUTO: 8.5 K/UL (ref 1.8–7.7)
NEUTROPHILS NFR BLD: 61.1 % (ref 38–73)
NRBC BLD-RTO: 0 /100 WBC
PLATELET # BLD AUTO: 262 K/UL (ref 150–450)
PMV BLD AUTO: 10.4 FL (ref 9.2–12.9)
POTASSIUM SERPL-SCNC: 3.3 MMOL/L (ref 3.5–5.1)
PROT SERPL-MCNC: 6.6 G/DL (ref 6–8.4)
PROT UR-MCNC: 8 MG/DL (ref 0–15)
PROT/CREAT UR: 0.35 MG/G{CREAT} (ref 0–0.2)
RBC # BLD AUTO: 3.62 M/UL (ref 4–5.4)
SARS-COV-2 RDRP RESP QL NAA+PROBE: NEGATIVE
SODIUM SERPL-SCNC: 136 MMOL/L (ref 136–145)
SPECIMEN SOURCE: NORMAL
WBC # BLD AUTO: 13.98 K/UL (ref 3.9–12.7)

## 2022-04-07 PROCEDURE — 80053 COMPREHEN METABOLIC PANEL: CPT | Performed by: OBSTETRICS & GYNECOLOGY

## 2022-04-07 PROCEDURE — 82570 ASSAY OF URINE CREATININE: CPT | Performed by: OBSTETRICS & GYNECOLOGY

## 2022-04-07 PROCEDURE — 59025 PR FETAL 2N-STRESS TEST: ICD-10-PCS | Mod: 26,,, | Performed by: OBSTETRICS & GYNECOLOGY

## 2022-04-07 PROCEDURE — 87502 INFLUENZA DNA AMP PROBE: CPT | Performed by: STUDENT IN AN ORGANIZED HEALTH CARE EDUCATION/TRAINING PROGRAM

## 2022-04-07 PROCEDURE — 59025 FETAL NON-STRESS TEST: CPT

## 2022-04-07 PROCEDURE — 99284 EMERGENCY DEPT VISIT MOD MDM: CPT | Mod: 25

## 2022-04-07 PROCEDURE — 85025 COMPLETE CBC W/AUTO DIFF WBC: CPT | Performed by: OBSTETRICS & GYNECOLOGY

## 2022-04-07 PROCEDURE — U0002 COVID-19 LAB TEST NON-CDC: HCPCS | Performed by: STUDENT IN AN ORGANIZED HEALTH CARE EDUCATION/TRAINING PROGRAM

## 2022-04-07 PROCEDURE — 59025 FETAL NON-STRESS TEST: CPT | Mod: 26,,, | Performed by: OBSTETRICS & GYNECOLOGY

## 2022-04-07 PROCEDURE — 99284 PR EMERGENCY DEPT VISIT,LEVEL IV: ICD-10-PCS | Mod: 25,,, | Performed by: OBSTETRICS & GYNECOLOGY

## 2022-04-07 PROCEDURE — 99284 EMERGENCY DEPT VISIT MOD MDM: CPT | Mod: 25,,, | Performed by: OBSTETRICS & GYNECOLOGY

## 2022-04-07 RX ORDER — BUTALBITAL, ACETAMINOPHEN AND CAFFEINE 50; 325; 40 MG/1; MG/1; MG/1
2 TABLET ORAL ONCE
Status: DISCONTINUED | OUTPATIENT
Start: 2022-04-07 | End: 2022-04-07 | Stop reason: HOSPADM

## 2022-04-07 NOTE — ED PROVIDER NOTES
Encounter Date: 2022       History     Chief Complaint   Patient presents with    Hypertension     Earlene Nassar is a 31 y.o. [id number] at 35w0d presents complaining of SOB and mild HA. This IUP is complicated by gHTN, h/o fourth degree laceration in prior  (requests LTCS) and GBSS bacteruria. Patient reports she has had SOB for the past week, including walks to the bathroom. She also reports a mild dull HA since 1430, unrelieved by Tylenol.     Patient denies contractions, denies vaginal bleeding, denies LOF.   Fetal Movement: normal.          Review of patient's allergies indicates:   Allergen Reactions    Codeine Other (See Comments)     Per pt's mother she had rash as a child but took narcotics after Tonsillectomy without problems     Past Medical History:   Diagnosis Date    Anxiety     Xanax prn    Asthma due to environmental allergies     Xyzal     Past Surgical History:   Procedure Laterality Date    ADENOIDECTOMY      TONSILLECTOMY       Family History   Problem Relation Age of Onset    No Known Problems Mother     Heart disease Father     No Known Problems Maternal Grandmother     No Known Problems Maternal Grandfather     No Known Problems Paternal Grandmother     No Known Problems Paternal Grandfather     No Known Problems Sister     Breast cancer Neg Hx     Colon cancer Neg Hx     Ovarian cancer Neg Hx     Cervical cancer Neg Hx     Endometrial cancer Neg Hx     Vaginal cancer Neg Hx      Social History     Tobacco Use    Smoking status: Never Smoker    Smokeless tobacco: Never Used   Substance Use Topics    Alcohol use: Yes     Comment: occasionally    Drug use: No     Review of Systems   Constitutional: Negative for chills and fever.   Eyes: Negative for photophobia and visual disturbance.   Respiratory: Positive for shortness of breath. Negative for cough.    Cardiovascular: Negative for chest pain, palpitations and leg swelling.   Gastrointestinal: Negative for  abdominal pain, nausea and vomiting.   Genitourinary: Negative for difficulty urinating, dysuria, flank pain, frequency, hematuria, pelvic pain, urgency, vaginal bleeding, vaginal discharge and vaginal pain.   Neurological: Positive for headaches.       Physical Exam     Initial Vitals   BP Pulse Resp Temp SpO2   04/07/22 1830 04/07/22 1840 04/07/22 1840 04/07/22 1840 04/07/22 1840   (!) 140/93 102 18 98.4 °F (36.9 °C) 98 %      MAP       --                Physical Exam    Vitals reviewed.  Constitutional: She appears well-developed and well-nourished. She is not diaphoretic. She appears distressed (tearful and worried).   HENT:   Head: Normocephalic and atraumatic.   Eyes: Conjunctivae are normal. Pupils are equal, round, and reactive to light.   Neck:   Normal range of motion.  Cardiovascular: Normal rate and regular rhythm.   Pulmonary/Chest: Effort normal and breath sounds normal. No respiratory distress. She exhibits no tenderness.   Abdominal: Abdomen is soft. There is no abdominal tenderness.   No right CVA tenderness.  No left CVA tenderness. There is no rebound and no guarding.   Musculoskeletal:         General: No tenderness or edema. Normal range of motion.      Cervical back: Normal range of motion.     Neurological: She is alert and oriented to person, place, and time.   Skin: Skin is warm and dry. Capillary refill takes less than 2 seconds.   Psychiatric: She has a normal mood and affect. Her behavior is normal. Judgment and thought content normal.         ED Course   Obtain Fetal nonstress test (NST)    Date/Time: 4/7/2022 6:52 PM  Performed by: Veronica Oliva MD  Authorized by: Veronica Oliva MD     Nonstress Test:     Variability:  6-25 BPM    Decelerations:  Variable    Accelerations:  15 bpm    Baseline:  140    Contractions:  Not present  Biophysical Profile:     Nonstress Test Interpretation: reactive      Overall Impression:  Reassuring  Post-procedure:     Patient tolerance:  Patient  tolerated the procedure well with no immediate complications      Labs Reviewed   CBC W/ AUTO DIFFERENTIAL - Abnormal; Notable for the following components:       Result Value    WBC 13.98 (*)     RBC 3.62 (*)     Hemoglobin 11.3 (*)     Hematocrit 32.6 (*)     MCH 31.2 (*)     Immature Granulocytes 0.9 (*)     Gran # (ANC) 8.5 (*)     Immature Grans (Abs) 0.13 (*)     Mono # 1.8 (*)     All other components within normal limits   COMPREHENSIVE METABOLIC PANEL - Abnormal; Notable for the following components:    Potassium 3.3 (*)     CO2 21 (*)     Albumin 2.7 (*)     AST 9 (*)     All other components within normal limits   PROTEIN / CREATININE RATIO, URINE - Abnormal; Notable for the following components:    Prot/Creat Ratio, Urine 0.35 (*)     All other components within normal limits    Narrative:     Specimen Source->Urine   INFLUENZA A & B BY MOLECULAR   SARS-COV-2 RNA AMPLIFICATION, QUAL          Imaging Results          X-Ray Chest AP Portable (Final result)  Result time 04/07/22 19:57:14    Final result by Tushar Hussein MD (04/07/22 19:57:14)                 Impression:      No acute cardiopulmonary process identified.      Electronically signed by: Tushar Hussein MD  Date:    04/07/2022  Time:    19:57             Narrative:    EXAMINATION:  XR CHEST AP PORTABLE    CLINICAL HISTORY:  SOB;    TECHNIQUE:  Single frontal view of the chest was performed.    COMPARISON:  March 2015.    FINDINGS:  Cardiac silhouette is normal in size.  Lungs are symmetrically expanded.  No evidence of focal consolidative process, pneumothorax, or significant pleural effusion.  No acute osseous abnormality identified.                                 Medications - No data to display  Medical Decision Making:   ED Management:  1. gHTN >> Now Pre-eclampsia w/o SF:   - Afebrile, mild range BPs in [facility]  - NST: RR, TOCO: No ctx  - CBC, CMP wnl, PCR 0.35  - CXR: No acute cardiopulmonary process identified.  - Patient's HA relieved  in [facility] with fioricet.   - Strict counseling given of severe features and when to return to OB ED.   - Patient understands that delivery is indicated at 37 weeks gestation.   - Discussed with OB ED staff, patient stable for discharge.   Other:   I discussed test(s) with the performing physician.  I have discussed this case with another health care provider.            Attending Attestation:   Physician Attestation Statement for Resident:  As the supervising MD   Physician Attestation Statement: I have personally seen and examined this patient.   I agree with the above history. -:   As the supervising MD I agree with the above PE.    As the supervising MD I agree with the above treatment, course, plan, and disposition.   -: Patient evaluated and found to be stable, agree with resident's assessment and plan.  NST reactive and reassuring.  Bps mildly elevated, none in severe range.  Pre-E labs c/w mild preeclampsia by P/C 0.35  CXR normal  Agree with discharge to home.  [facility] warnings given and kick counts discussed.    I was personally present during the critical portions of the procedure(s) performed by the resident and was immediately available in the ED to provide services and assistance as needed during the entire procedure.  I have reviewed and agree with the residents interpretation of the following: lab data and x-rays.  I have reviewed the following: old records at this facility.                         Clinical Impression:   Final diagnoses:  [O14.93] Preeclampsia, third trimester (Primary)  [Z3A.35] 35 weeks gestation of pregnancy          ED Disposition Condition    Discharge Stable        ED Prescriptions     None        Follow-up Information    None          Veronica Oliva MD  Resident  04/08/22 0006       Elaine Foster MD  04/08/22 0620

## 2022-04-07 NOTE — TELEPHONE ENCOUNTER
Contact made with patient, instructed to proceed to [facility] at this time for persistent SOB, mild BP elevation, and headache. [facility] notified.

## 2022-04-07 NOTE — TELEPHONE ENCOUNTER
----- Message from Sariah Mejia MD sent at 4/6/2022  7:48 PM CDT -----  Schedule primary LTCS at 7 am April 25   Dx- gestational hypertension, previous 4th degree tear

## 2022-04-07 NOTE — PROGRESS NOTES
Diagnosis of gestational hypertension. Discussed in detail  Plan for primary LTCS at 37 weeks for h/o 4th degree tear and now GHTN  BPP today and will schedule twice weekly testing for the next couple of weeks until delivery.

## 2022-04-08 NOTE — DISCHARGE INSTRUCTIONS
Call clinic [phone] or L & D after hours at [phone] for vaginal bleeding, leakage of fluids, contractions 4-5 in one hour, decreased fetal movements ( 10 kicks in 2 hours), headache not relieved by Tylenol, blurry vision, or temp of 100.4 or greater.  Begin doing fetal kick counts, at least 10 movements in 2 hours starting at 28 weeks gestation.  Keep next clinic appointment

## 2022-04-11 NOTE — TELEPHONE ENCOUNTER
Pt has appt with you tomorrow.  Dr. Carmen said to come see her about this pt to talk about the plan.  Dr. Carmen will be at [facility] tomorrow.

## 2022-04-12 ENCOUNTER — HOSPITAL ENCOUNTER (OUTPATIENT)
Dept: PERINATAL CARE | Facility: OTHER | Age: 32
Discharge: HOME | End: 2022-04-12
Attending: OBSTETRICS & GYNECOLOGY
Payer: COMMERCIAL

## 2022-04-12 ENCOUNTER — ROUTINE PRENATAL (OUTPATIENT)
Dept: OBSTETRICS AND GYNECOLOGY | Facility: CLINIC | Age: 32
End: 2022-04-12
Payer: COMMERCIAL

## 2022-04-12 VITALS
WEIGHT: 154.19 LBS | SYSTOLIC BLOOD PRESSURE: 118 MMHG | BODY MASS INDEX: 27.32 KG/M2 | DIASTOLIC BLOOD PRESSURE: 78 MMHG

## 2022-04-12 DIAGNOSIS — O14.93 PRE-ECLAMPSIA IN THIRD TRIMESTER: ICD-10-CM

## 2022-04-12 DIAGNOSIS — I10 CHRONIC HYPERTENSION: ICD-10-CM

## 2022-04-12 DIAGNOSIS — Z3A.35 35 WEEKS GESTATION OF PREGNANCY: Primary | ICD-10-CM

## 2022-04-12 DIAGNOSIS — O14.93 PRE-ECLAMPSIA, ANTEPARTUM, THIRD TRIMESTER: Primary | ICD-10-CM

## 2022-04-12 LAB
CREAT UR-MCNC: 16 MG/DL (ref 15–325)
PROT UR-MCNC: <7 MG/DL (ref 0–15)
PROT/CREAT UR: NORMAL MG/G{CREAT} (ref 0–0.2)

## 2022-04-12 PROCEDURE — 59025 PRENATAL TESTING - NST/AFI: ICD-10-PCS | Mod: 26,,, | Performed by: OBSTETRICS & GYNECOLOGY

## 2022-04-12 PROCEDURE — 59025 FETAL NON-STRESS TEST: CPT | Mod: 26,,, | Performed by: OBSTETRICS & GYNECOLOGY

## 2022-04-12 PROCEDURE — 99999 PR PBB SHADOW E&M-EST. PATIENT-LVL III: ICD-10-PCS | Mod: PBBFAC,,, | Performed by: NURSE PRACTITIONER

## 2022-04-12 PROCEDURE — 84156 ASSAY OF PROTEIN URINE: CPT | Performed by: NURSE PRACTITIONER

## 2022-04-12 PROCEDURE — 0502F SUBSEQUENT PRENATAL CARE: CPT | Mod: CPTII,S$GLB,, | Performed by: NURSE PRACTITIONER

## 2022-04-12 PROCEDURE — 0502F PR SUBSEQUENT PRENATAL CARE: ICD-10-PCS | Mod: CPTII,S$GLB,, | Performed by: NURSE PRACTITIONER

## 2022-04-12 PROCEDURE — 59025 FETAL NON-STRESS TEST: CPT

## 2022-04-12 PROCEDURE — 99999 PR PBB SHADOW E&M-EST. PATIENT-LVL III: CPT | Mod: PBBFAC,,, | Performed by: NURSE PRACTITIONER

## 2022-04-12 NOTE — PROGRESS NOTES
Presents at 35w5d for LINDA. Excellent FM, denies VB, LOF, ctx. GBBS+. Denies preE symptoms today, labs ordered. Twice weekly PNT until delivery. LTCS rescheduled to 4/21/22 per [facility]. Strict preE, labor precautions. FU in 1 week for LINDA.

## 2022-04-13 ENCOUNTER — PATIENT MESSAGE (OUTPATIENT)
Dept: OBSTETRICS AND GYNECOLOGY | Facility: CLINIC | Age: 32
End: 2022-04-13
Payer: COMMERCIAL

## 2022-04-13 ENCOUNTER — TELEPHONE (OUTPATIENT)
Dept: OBSTETRICS AND GYNECOLOGY | Facility: CLINIC | Age: 32
End: 2022-04-13
Payer: COMMERCIAL

## 2022-04-13 ENCOUNTER — PATIENT MESSAGE (OUTPATIENT)
Dept: OBSTETRICS AND GYNECOLOGY | Facility: OTHER | Age: 32
End: 2022-04-13
Payer: COMMERCIAL

## 2022-04-13 DIAGNOSIS — Z3A.35 35 WEEKS GESTATION OF PREGNANCY: Primary | ICD-10-CM

## 2022-04-13 NOTE — TELEPHONE ENCOUNTER
LM to notify pt blood work normal and to stop by [facility] tomorrow for additional labs either before or after testing.

## 2022-04-13 NOTE — TELEPHONE ENCOUNTER
----- Message from Marifer Nassar NP sent at 4/13/2022  9:42 AM CDT -----  Please notify her lab work from yesterday looked great! Please schedule for HIV and RPR sometime this week.

## 2022-04-14 ENCOUNTER — PROCEDURE VISIT (OUTPATIENT)
Dept: OBSTETRICS AND GYNECOLOGY | Facility: CLINIC | Age: 32
End: 2022-04-14
Attending: OBSTETRICS & GYNECOLOGY
Payer: COMMERCIAL

## 2022-04-14 ENCOUNTER — LAB VISIT (OUTPATIENT)
Dept: LAB | Facility: OTHER | Age: 32
End: 2022-04-14
Attending: NURSE PRACTITIONER
Payer: COMMERCIAL

## 2022-04-14 DIAGNOSIS — Z3A.35 35 WEEKS GESTATION OF PREGNANCY: ICD-10-CM

## 2022-04-14 DIAGNOSIS — I10 CHRONIC HYPERTENSION: ICD-10-CM

## 2022-04-14 PROCEDURE — 36415 COLL VENOUS BLD VENIPUNCTURE: CPT | Performed by: NURSE PRACTITIONER

## 2022-04-14 PROCEDURE — 86592 SYPHILIS TEST NON-TREP QUAL: CPT | Performed by: NURSE PRACTITIONER

## 2022-04-14 PROCEDURE — 87389 HIV-1 AG W/HIV-1&-2 AB AG IA: CPT | Performed by: NURSE PRACTITIONER

## 2022-04-14 PROCEDURE — 76819 FETAL BIOPHYS PROFIL W/O NST: CPT | Mod: S$GLB,,, | Performed by: OBSTETRICS & GYNECOLOGY

## 2022-04-14 PROCEDURE — 76819 US OB/GYN EXTENDED PROCEDURE (VIEWPOINT): ICD-10-PCS | Mod: S$GLB,,, | Performed by: OBSTETRICS & GYNECOLOGY

## 2022-04-15 LAB
HIV 1+2 AB+HIV1 P24 AG SERPL QL IA: NEGATIVE
RPR SER QL: NORMAL

## 2022-04-18 ENCOUNTER — PATIENT MESSAGE (OUTPATIENT)
Dept: OBSTETRICS AND GYNECOLOGY | Facility: CLINIC | Age: 32
End: 2022-04-18
Payer: COMMERCIAL

## 2022-04-18 ENCOUNTER — HOSPITAL ENCOUNTER (OUTPATIENT)
Dept: PREADMISSION TESTING | Facility: OTHER | Age: 32
Discharge: HOME | End: 2022-04-18
Attending: OBSTETRICS & GYNECOLOGY
Payer: COMMERCIAL

## 2022-04-18 ENCOUNTER — RESEARCH ENCOUNTER (OUTPATIENT)
Dept: RESEARCH | Facility: OTHER | Age: 32
End: 2022-04-18

## 2022-04-18 ENCOUNTER — PATIENT MESSAGE (OUTPATIENT)
Dept: OBSTETRICS AND GYNECOLOGY | Facility: OTHER | Age: 32
End: 2022-04-18
Payer: COMMERCIAL

## 2022-04-18 ENCOUNTER — HOSPITAL ENCOUNTER (OUTPATIENT)
Dept: PERINATAL CARE | Facility: OTHER | Age: 32
Discharge: HOME | End: 2022-04-18
Attending: OBSTETRICS & GYNECOLOGY
Payer: COMMERCIAL

## 2022-04-18 DIAGNOSIS — I10 CHRONIC HYPERTENSION: ICD-10-CM

## 2022-04-18 DIAGNOSIS — Z01.818 PREPROCEDURAL EXAMINATION: ICD-10-CM

## 2022-04-18 PROCEDURE — U0005 INFEC AGEN DETEC AMPLI PROBE: HCPCS | Performed by: OBSTETRICS & GYNECOLOGY

## 2022-04-18 PROCEDURE — 59025 FETAL NON-STRESS TEST: CPT | Mod: 26,,, | Performed by: OBSTETRICS & GYNECOLOGY

## 2022-04-18 PROCEDURE — 59025 PRENATAL TESTING - NST/AFI: ICD-10-PCS | Mod: 26,,, | Performed by: OBSTETRICS & GYNECOLOGY

## 2022-04-18 PROCEDURE — 76815 PRENATAL TESTING - NST/AFI: ICD-10-PCS | Mod: 26,,, | Performed by: OBSTETRICS & GYNECOLOGY

## 2022-04-18 PROCEDURE — 76815 OB US LIMITED FETUS(S): CPT

## 2022-04-18 PROCEDURE — U0003 INFECTIOUS AGENT DETECTION BY NUCLEIC ACID (DNA OR RNA); SEVERE ACUTE RESPIRATORY SYNDROME CORONAVIRUS 2 (SARS-COV-2) (CORONAVIRUS DISEASE [COVID-19]), AMPLIFIED PROBE TECHNIQUE, MAKING USE OF HIGH THROUGHPUT TECHNOLOGIES AS DESCRIBED BY CMS-2020-01-R: HCPCS | Performed by: OBSTETRICS & GYNECOLOGY

## 2022-04-18 PROCEDURE — 59025 FETAL NON-STRESS TEST: CPT

## 2022-04-18 PROCEDURE — 76815 OB US LIMITED FETUS(S): CPT | Mod: 26,,, | Performed by: OBSTETRICS & GYNECOLOGY

## 2022-04-18 NOTE — PROGRESS NOTES
".Screening Visit  Sponsor: [employer]  Study: [employer]- A cross-sectional, interventional, single-arm study for validating the equivalence of [employer] MFM Pro to clinical standard-of-care for performing antepartum fetal monitoring.  IRB/Protocol #: 2021.020/ HWR--US  Principle Investigator/ Sub-Investigator: José Luis Yarbrough MD  Site: [facility]  Location: [facility] Prenatal Testing  Subject Number: [id number]     Subject presented for enrollment in [employer] MFM Pro.  Subject is 36 weeks 4 days pregnant.  Subject meets all eligibility criteria for enrollment in study.     Prior to the Informed Consent (IC) being signed, or any study protocol required data collection, testing, procedure, or intervention being performed, the following was done and/or discussed:?   · Patient was given a copy of the IC for review??   · Purpose of the study and qualifications to participate??   · Study design, follow up schedule  · Confidentiality and HIPAA Authorization for Release of Medical Records for the research trial/ subject's rights/research related injury?   · Risk, Benefits, Compensation and Costs?   · Participation in the research trial is voluntary and patient may withdraw at anytime?   · Contact information for study related questions?      Patient verbalizes understanding of the above: Yes?   Contact information for CRC and PI given to patient: Yes?   Patient able to adequately summarize: the purpose of the study, the risks associated with the study, and all procedures, and follow-ups associated with the study: Yes?      Informed Consent:   Consenting was completed in private area using the most current IRB approved version of the ICF (Revised 07 MAR 2022) and patient was given ample time to review consent prior to execution of ICF.  Before signing consent, patient was asked if she had any questions and she stated "no".   Earlene Nassar signed the IRB approved version of informed consent form for the " [facility].? Each page of the consent was reviewed with the patient and all questions answered satisfactorily. The patient signed the paper consent form and received a copy of same (copy of informed consent made and provided to patient). The original consent was scanned into electronic medical records (EPIC).    Time of Consent Execution: 8:33AM     Successful Screening Validation completed with MFM Pro: Yes  Name of Screening : Radha Tse  Date of [id number]  Start time: 8:47AM  End Time: 9:32AM  Patient received [employer] Card Token # [id number].  Research participant received $100 stipend.

## 2022-04-19 LAB
SARS-COV-2 RNA RESP QL NAA+PROBE: NOT DETECTED
SARS-COV-2- CYCLE NUMBER: NORMAL

## 2022-04-20 ENCOUNTER — PATIENT MESSAGE (OUTPATIENT)
Dept: OBSTETRICS AND GYNECOLOGY | Facility: OTHER | Age: 32
End: 2022-04-20
Payer: COMMERCIAL

## 2022-04-20 NOTE — H&P
HISTORY AND PHYSICAL                                                OBSTETRICS          Subjective:      Earlene Nassar is a 31 y.o.  female with IUP at 37 weeks gestation who presents to L&D for primary LTCS. Pertinent medical history for this pregnancy includes preEclampsia, previous fourth degree laceration with first pregnancy- took months to heal, GBBS bacteria, low lying placenta resolved, s/p BMZ 3/31-.     Patient reports good FM, no vaginal bleeding, pain, loss of fluid, or contractions. Care this pregnancy has been with Dr. Mejia- previous Dr. Sky    PMHx:   Past Medical History:   Diagnosis Date    Anxiety     Xanax prn    Asthma due to environmental allergies     Xyzal       PSHx:   Past Surgical History:   Procedure Laterality Date    ADENOIDECTOMY      TONSILLECTOMY         All:   Review of patient's allergies indicates:   Allergen Reactions    Codeine Other (See Comments)     Per pt's mother she had rash as a child but took narcotics after Tonsillectomy without problems       Meds:   No medications prior to admission.       SH:   Social History     Socioeconomic History    Marital status:    Tobacco Use    Smoking status: Never Smoker    Smokeless tobacco: Never Used   Substance and Sexual Activity    Alcohol use: Yes     Comment: occasionally    Drug use: No    Sexual activity: Yes     Partners: Male     Birth control/protection: None       FH:   Family History   Problem Relation Age of Onset    No Known Problems Mother     Heart disease Father     No Known Problems Maternal Grandmother     No Known Problems Maternal Grandfather     No Known Problems Paternal Grandmother     No Known Problems Paternal Grandfather     No Known Problems Sister     Breast cancer Neg Hx     Colon cancer Neg Hx     Ovarian cancer Neg Hx     Cervical cancer Neg Hx     Endometrial cancer Neg Hx     Vaginal cancer Neg Hx        OBHx:   OB History    Para Term  AB  Living   2 1 1 0 0 1   SAB IAB Ectopic Multiple Live Births   0 0 0 0 1      # Outcome Date GA Lbr Andrew/2nd Weight Sex Delivery Anes PTL Lv   2 Current            1 Term 17 39w0d  3.402 kg (7 lb 8 oz) M Vag-Spont EPI N JJ      Birth Comments: 3rd degree laceration repair, was in pelvic floor PT for weakness      Name: ISIS NASSAR      Apgar1: 9  Apgar5: 9      Obstetric Comments   Menarche ~14       Objective:      LMP 2021   There is no height or weight on file to calculate BMI.    General:   alert and cooperative   HEENT:  normocephalic, atraumatic   Lungs:   clear to auscultation bilaterally   Heart:   regular rate and rhythm, S1, S2 normal   Abdomen:  gravid, non-tender   Extremities non-tender, no edema   Derm: no rashes or lesions   Psych: appropriate mood and affect   Pelvis:  adequate         Lab Review  Blood Type A POS  GBBS: positive   Rubella:   Lab Results   Component Value Date    RUBELLAIGGAN 12.7 10/06/2021    immune  RPR:   RPR   Date Value Ref Range Status   2022 Non-reactive Non-reactive Final      HIV:   Lab Results   Component Value Date    [id number] Negative 2022     HepB:   Hepatitis B Surface Ag   Date Value Ref Range Status   10/06/2021 Negative Negative Final        Assessment:     31 y.o.  at 37 weeks gestation.  PreEclampsia  History of fourth degree tear with first baby  GBBS  S/p BMZ 3/31-  Desires primary LTCS due to h/o fourth degree    There are no hospital problems to display for this patient.         Plan:     1. Risks, benefits, alternatives and possible complications have been discussed in detail with the patient. All questions have been answered, and Ms. Nassar has voiced understanding and agrees to the treatment plan.  2. Consents signed and in chart  3. Admit to Labor and Delivery unit  4. To OR for primary LTCS

## 2022-04-21 ENCOUNTER — ANESTHESIA (OUTPATIENT)
Dept: OBSTETRICS AND GYNECOLOGY | Facility: OTHER | Age: 32
End: 2022-04-21
Payer: COMMERCIAL

## 2022-04-21 ENCOUNTER — HOSPITAL ENCOUNTER (INPATIENT)
Facility: OTHER | Age: 32
LOS: 3 days | Discharge: HOME | End: 2022-04-24
Attending: OBSTETRICS & GYNECOLOGY | Admitting: OBSTETRICS & GYNECOLOGY
Payer: COMMERCIAL

## 2022-04-21 ENCOUNTER — ANESTHESIA EVENT (OUTPATIENT)
Dept: OBSTETRICS AND GYNECOLOGY | Facility: OTHER | Age: 32
End: 2022-04-21
Payer: COMMERCIAL

## 2022-04-21 DIAGNOSIS — O14.93 PRE-ECLAMPSIA IN THIRD TRIMESTER: Primary | ICD-10-CM

## 2022-04-21 DIAGNOSIS — O09.299 H/O MATERNAL FOURTH DEGREE PERINEAL LACERATION, CURRENTLY PREGNANT: ICD-10-CM

## 2022-04-21 LAB
ABO + RH BLD: NORMAL
ALBUMIN SERPL BCP-MCNC: 2.6 G/DL (ref 3.5–5.2)
ALP SERPL-CCNC: 139 U/L (ref 55–135)
ALT SERPL W/O P-5'-P-CCNC: 11 U/L (ref 10–44)
ANION GAP SERPL CALC-SCNC: 14 MMOL/L (ref 8–16)
AST SERPL-CCNC: 14 U/L (ref 10–40)
BASOPHILS # BLD AUTO: 0.03 K/UL (ref 0–0.2)
BASOPHILS NFR BLD: 0.3 % (ref 0–1.9)
BILIRUB SERPL-MCNC: 0.3 MG/DL (ref 0.1–1)
BLD GP AB SCN CELLS X3 SERPL QL: NORMAL
BUN SERPL-MCNC: 7 MG/DL (ref 6–20)
CALCIUM SERPL-MCNC: 8.8 MG/DL (ref 8.7–10.5)
CHLORIDE SERPL-SCNC: 106 MMOL/L (ref 95–110)
CO2 SERPL-SCNC: 18 MMOL/L (ref 23–29)
CREAT SERPL-MCNC: 0.5 MG/DL (ref 0.5–1.4)
DIFFERENTIAL METHOD: ABNORMAL
EOSINOPHIL # BLD AUTO: 0.1 K/UL (ref 0–0.5)
EOSINOPHIL NFR BLD: 0.5 % (ref 0–8)
ERYTHROCYTE [DISTWIDTH] IN BLOOD BY AUTOMATED COUNT: 13.1 % (ref 11.5–14.5)
EST. GFR  (AFRICAN AMERICAN): >60 ML/MIN/1.73 M^2
EST. GFR  (NON AFRICAN AMERICAN): >60 ML/MIN/1.73 M^2
GLUCOSE SERPL-MCNC: 93 MG/DL (ref 70–110)
HCT VFR BLD AUTO: 33.4 % (ref 37–48.5)
HGB BLD-MCNC: 11.3 G/DL (ref 12–16)
IMM GRANULOCYTES # BLD AUTO: 0.07 K/UL (ref 0–0.04)
IMM GRANULOCYTES NFR BLD AUTO: 0.6 % (ref 0–0.5)
LYMPHOCYTES # BLD AUTO: 3.1 K/UL (ref 1–4.8)
LYMPHOCYTES NFR BLD: 27.8 % (ref 18–48)
MCH RBC QN AUTO: 30.5 PG (ref 27–31)
MCHC RBC AUTO-ENTMCNC: 33.8 G/DL (ref 32–36)
MCV RBC AUTO: 90 FL (ref 82–98)
MONOCYTES # BLD AUTO: 1.3 K/UL (ref 0.3–1)
MONOCYTES NFR BLD: 11.9 % (ref 4–15)
NEUTROPHILS # BLD AUTO: 6.6 K/UL (ref 1.8–7.7)
NEUTROPHILS NFR BLD: 58.9 % (ref 38–73)
NRBC BLD-RTO: 0 /100 WBC
PLATELET # BLD AUTO: 235 K/UL (ref 150–450)
PMV BLD AUTO: 10.8 FL (ref 9.2–12.9)
POTASSIUM SERPL-SCNC: 3.4 MMOL/L (ref 3.5–5.1)
PROT SERPL-MCNC: 6.4 G/DL (ref 6–8.4)
RBC # BLD AUTO: 3.71 M/UL (ref 4–5.4)
SODIUM SERPL-SCNC: 138 MMOL/L (ref 136–145)
WBC # BLD AUTO: 11.28 K/UL (ref 3.9–12.7)

## 2022-04-21 PROCEDURE — 59510 PRA FULL ROUT OBSTE CARE,CESAREAN DELIV: ICD-10-PCS | Mod: QY,,, | Performed by: ANESTHESIOLOGY

## 2022-04-21 PROCEDURE — 25000003 PHARM REV CODE 250: Performed by: STUDENT IN AN ORGANIZED HEALTH CARE EDUCATION/TRAINING PROGRAM

## 2022-04-21 PROCEDURE — 37000009 HC ANESTHESIA EA ADD 15 MINS: Performed by: OBSTETRICS & GYNECOLOGY

## 2022-04-21 PROCEDURE — 51702 INSERT TEMP BLADDER CATH: CPT

## 2022-04-21 PROCEDURE — 37000008 HC ANESTHESIA 1ST 15 MINUTES: Performed by: OBSTETRICS & GYNECOLOGY

## 2022-04-21 PROCEDURE — 63600175 PHARM REV CODE 636 W HCPCS: Performed by: STUDENT IN AN ORGANIZED HEALTH CARE EDUCATION/TRAINING PROGRAM

## 2022-04-21 PROCEDURE — 63600175 PHARM REV CODE 636 W HCPCS: Performed by: OBSTETRICS & GYNECOLOGY

## 2022-04-21 PROCEDURE — 25000003 PHARM REV CODE 250: Performed by: OBSTETRICS & GYNECOLOGY

## 2022-04-21 PROCEDURE — 11000001 HC ACUTE MED/SURG PRIVATE ROOM

## 2022-04-21 PROCEDURE — 71000033 HC RECOVERY, INTIAL HOUR: Performed by: OBSTETRICS & GYNECOLOGY

## 2022-04-21 PROCEDURE — 59510 CESAREAN DELIVERY: CPT | Mod: QY,,, | Performed by: ANESTHESIOLOGY

## 2022-04-21 PROCEDURE — 80053 COMPREHEN METABOLIC PANEL: CPT

## 2022-04-21 PROCEDURE — 85025 COMPLETE CBC W/AUTO DIFF WBC: CPT

## 2022-04-21 PROCEDURE — 59510 PR FULL ROUT OBSTE CARE,CESAREAN DELIV: ICD-10-PCS | Mod: AT,,, | Performed by: OBSTETRICS & GYNECOLOGY

## 2022-04-21 PROCEDURE — 59510 CESAREAN DELIVERY: CPT | Mod: AT,,, | Performed by: OBSTETRICS & GYNECOLOGY

## 2022-04-21 PROCEDURE — 71000039 HC RECOVERY, EACH ADD'L HOUR: Performed by: OBSTETRICS & GYNECOLOGY

## 2022-04-21 PROCEDURE — 86901 BLOOD TYPING SEROLOGIC RH(D): CPT

## 2022-04-21 PROCEDURE — 36004725 HC OB OR TIME LEV III - EA ADD 15 MIN: Performed by: OBSTETRICS & GYNECOLOGY

## 2022-04-21 PROCEDURE — 36004724 HC OB OR TIME LEV III - 1ST 15 MIN: Performed by: OBSTETRICS & GYNECOLOGY

## 2022-04-21 RX ORDER — SODIUM CHLORIDE, SODIUM LACTATE, POTASSIUM CHLORIDE, CALCIUM CHLORIDE 600; 310; 30; 20 MG/100ML; MG/100ML; MG/100ML; MG/100ML
INJECTION, SOLUTION INTRAVENOUS CONTINUOUS
Status: DISCONTINUED | OUTPATIENT
Start: 2022-04-21 | End: 2022-04-23

## 2022-04-21 RX ORDER — ONDANSETRON HYDROCHLORIDE 2 MG/ML
INJECTION, SOLUTION INTRAMUSCULAR; INTRAVENOUS
Status: DISCONTINUED | OUTPATIENT
Start: 2022-04-21 | End: 2022-04-21

## 2022-04-21 RX ORDER — OXYTOCIN/RINGER'S LACTATE 30/500 ML
95 PLASTIC BAG, INJECTION (ML) INTRAVENOUS CONTINUOUS
Status: DISCONTINUED | OUTPATIENT
Start: 2022-04-21 | End: 2022-04-23

## 2022-04-21 RX ORDER — MORPHINE SULFATE 0.5 MG/ML
INJECTION, SOLUTION EPIDURAL; INTRATHECAL; INTRAVENOUS
Status: DISCONTINUED | OUTPATIENT
Start: 2022-04-21 | End: 2022-04-21

## 2022-04-21 RX ORDER — KETOROLAC TROMETHAMINE 30 MG/ML
30 INJECTION, SOLUTION INTRAMUSCULAR; INTRAVENOUS
Status: COMPLETED | OUTPATIENT
Start: 2022-04-21 | End: 2022-04-22

## 2022-04-21 RX ORDER — BUPIVACAINE HYDROCHLORIDE 7.5 MG/ML
INJECTION, SOLUTION INTRASPINAL
Status: DISCONTINUED | OUTPATIENT
Start: 2022-04-21 | End: 2022-04-21

## 2022-04-21 RX ORDER — FAMOTIDINE 10 MG/ML
20 INJECTION INTRAVENOUS
Status: COMPLETED | OUTPATIENT
Start: 2022-04-21 | End: 2022-04-21

## 2022-04-21 RX ORDER — TRANEXAMIC ACID 100 MG/ML
1000 INJECTION, SOLUTION INTRAVENOUS ONCE AS NEEDED
Status: DISCONTINUED | OUTPATIENT
Start: 2022-04-21 | End: 2022-04-24 | Stop reason: HOSPADM

## 2022-04-21 RX ORDER — AMOXICILLIN 250 MG
1 CAPSULE ORAL NIGHTLY PRN
Status: DISCONTINUED | OUTPATIENT
Start: 2022-04-21 | End: 2022-04-24 | Stop reason: HOSPADM

## 2022-04-21 RX ORDER — MISOPROSTOL 200 UG/1
800 TABLET ORAL ONCE AS NEEDED
Status: DISCONTINUED | OUTPATIENT
Start: 2022-04-21 | End: 2022-04-24 | Stop reason: HOSPADM

## 2022-04-21 RX ORDER — CARBOPROST TROMETHAMINE 250 UG/ML
250 INJECTION, SOLUTION INTRAMUSCULAR
Status: DISCONTINUED | OUTPATIENT
Start: 2022-04-21 | End: 2022-04-24 | Stop reason: HOSPADM

## 2022-04-21 RX ORDER — OXYTOCIN 10 [USP'U]/ML
INJECTION, SOLUTION INTRAMUSCULAR; INTRAVENOUS
Status: DISCONTINUED | OUTPATIENT
Start: 2022-04-21 | End: 2022-04-21

## 2022-04-21 RX ORDER — DOCUSATE SODIUM 100 MG/1
200 CAPSULE, LIQUID FILLED ORAL 2 TIMES DAILY
Status: DISCONTINUED | OUTPATIENT
Start: 2022-04-21 | End: 2022-04-24 | Stop reason: HOSPADM

## 2022-04-21 RX ORDER — SIMETHICONE 80 MG
1 TABLET,CHEWABLE ORAL EVERY 6 HOURS PRN
Status: DISCONTINUED | OUTPATIENT
Start: 2022-04-21 | End: 2022-04-24 | Stop reason: HOSPADM

## 2022-04-21 RX ORDER — METHYLERGONOVINE MALEATE 0.2 MG/ML
200 INJECTION INTRAVENOUS ONCE AS NEEDED
Status: DISCONTINUED | OUTPATIENT
Start: 2022-04-21 | End: 2022-04-24 | Stop reason: HOSPADM

## 2022-04-21 RX ORDER — ACETAMINOPHEN 500 MG
1000 TABLET ORAL
Status: COMPLETED | OUTPATIENT
Start: 2022-04-21 | End: 2022-04-21

## 2022-04-21 RX ORDER — CEFAZOLIN SODIUM 1 G/3ML
INJECTION, POWDER, FOR SOLUTION INTRAMUSCULAR; INTRAVENOUS
Status: DISCONTINUED | OUTPATIENT
Start: 2022-04-21 | End: 2022-04-21

## 2022-04-21 RX ORDER — DIPHENHYDRAMINE HCL 25 MG
25 CAPSULE ORAL EVERY 6 HOURS PRN
Status: DISCONTINUED | OUTPATIENT
Start: 2022-04-21 | End: 2022-04-24 | Stop reason: HOSPADM

## 2022-04-21 RX ORDER — CEFAZOLIN SODIUM 2 G/50ML
2 SOLUTION INTRAVENOUS
Status: DISCONTINUED | OUTPATIENT
Start: 2022-04-21 | End: 2022-04-23

## 2022-04-21 RX ORDER — PROCHLORPERAZINE EDISYLATE 5 MG/ML
5 INJECTION INTRAMUSCULAR; INTRAVENOUS EVERY 6 HOURS PRN
Status: DISCONTINUED | OUTPATIENT
Start: 2022-04-21 | End: 2022-04-24 | Stop reason: HOSPADM

## 2022-04-21 RX ORDER — DEXAMETHASONE SODIUM PHOSPHATE 4 MG/ML
INJECTION, SOLUTION INTRA-ARTICULAR; INTRALESIONAL; INTRAMUSCULAR; INTRAVENOUS; SOFT TISSUE
Status: DISCONTINUED | OUTPATIENT
Start: 2022-04-21 | End: 2022-04-21

## 2022-04-21 RX ORDER — PRENATAL WITH FERROUS FUM AND FOLIC ACID 3080; 920; 120; 400; 22; 1.84; 3; 20; 10; 1; 12; 200; 27; 25; 2 [IU]/1; [IU]/1; MG/1; [IU]/1; MG/1; MG/1; MG/1; MG/1; MG/1; MG/1; UG/1; MG/1; MG/1; MG/1; MG/1
1 TABLET ORAL DAILY
Status: DISCONTINUED | OUTPATIENT
Start: 2022-04-21 | End: 2022-04-24 | Stop reason: HOSPADM

## 2022-04-21 RX ORDER — HYDROCORTISONE 25 MG/G
CREAM TOPICAL 3 TIMES DAILY PRN
Status: DISCONTINUED | OUTPATIENT
Start: 2022-04-21 | End: 2022-04-24 | Stop reason: HOSPADM

## 2022-04-21 RX ORDER — OXYCODONE HYDROCHLORIDE 5 MG/1
5 TABLET ORAL EVERY 4 HOURS PRN
Status: DISCONTINUED | OUTPATIENT
Start: 2022-04-21 | End: 2022-04-24 | Stop reason: HOSPADM

## 2022-04-21 RX ORDER — PHENYLEPHRINE HCL IN 0.9% NACL 1 MG/10 ML
SYRINGE (ML) INTRAVENOUS
Status: DISCONTINUED | OUTPATIENT
Start: 2022-04-21 | End: 2022-04-21

## 2022-04-21 RX ORDER — NALBUPHINE HYDROCHLORIDE 10 MG/ML
5 INJECTION, SOLUTION INTRAMUSCULAR; INTRAVENOUS; SUBCUTANEOUS ONCE AS NEEDED
Status: DISCONTINUED | OUTPATIENT
Start: 2022-04-21 | End: 2022-04-24 | Stop reason: HOSPADM

## 2022-04-21 RX ORDER — ONDANSETRON 2 MG/ML
INJECTION INTRAMUSCULAR; INTRAVENOUS
Status: DISCONTINUED | OUTPATIENT
Start: 2022-04-21 | End: 2022-04-21

## 2022-04-21 RX ORDER — ONDANSETRON 8 MG/1
8 TABLET, ORALLY DISINTEGRATING ORAL EVERY 8 HOURS PRN
Status: DISCONTINUED | OUTPATIENT
Start: 2022-04-21 | End: 2022-04-24 | Stop reason: HOSPADM

## 2022-04-21 RX ORDER — ACETAMINOPHEN 325 MG/1
650 TABLET ORAL
Status: DISCONTINUED | OUTPATIENT
Start: 2022-04-21 | End: 2022-04-24 | Stop reason: HOSPADM

## 2022-04-21 RX ORDER — DIPHENHYDRAMINE HYDROCHLORIDE 50 MG/ML
12.5 INJECTION INTRAMUSCULAR; INTRAVENOUS
Status: DISCONTINUED | OUTPATIENT
Start: 2022-04-21 | End: 2022-04-24 | Stop reason: HOSPADM

## 2022-04-21 RX ORDER — ONDANSETRON 2 MG/ML
4 INJECTION INTRAMUSCULAR; INTRAVENOUS EVERY 6 HOURS PRN
Status: DISCONTINUED | OUTPATIENT
Start: 2022-04-21 | End: 2022-04-24 | Stop reason: HOSPADM

## 2022-04-21 RX ORDER — IBUPROFEN 400 MG/1
800 TABLET ORAL
Status: DISCONTINUED | OUTPATIENT
Start: 2022-04-22 | End: 2022-04-24 | Stop reason: HOSPADM

## 2022-04-21 RX ORDER — FENTANYL CITRATE 50 UG/ML
INJECTION, SOLUTION INTRAMUSCULAR; INTRAVENOUS
Status: DISCONTINUED | OUTPATIENT
Start: 2022-04-21 | End: 2022-04-21

## 2022-04-21 RX ORDER — SODIUM CITRATE AND CITRIC ACID MONOHYDRATE 334; 500 MG/5ML; MG/5ML
30 SOLUTION ORAL
Status: COMPLETED | OUTPATIENT
Start: 2022-04-21 | End: 2022-04-21

## 2022-04-21 RX ORDER — OXYCODONE HYDROCHLORIDE 5 MG/1
10 TABLET ORAL EVERY 4 HOURS PRN
Status: DISCONTINUED | OUTPATIENT
Start: 2022-04-21 | End: 2022-04-24 | Stop reason: HOSPADM

## 2022-04-21 RX ADMIN — ONDANSETRON 4 MG: 2 INJECTION, SOLUTION INTRAMUSCULAR; INTRAVENOUS at 06:04

## 2022-04-21 RX ADMIN — FENTANYL CITRATE 10 MCG: 50 INJECTION, SOLUTION INTRAMUSCULAR; INTRAVENOUS at 06:04

## 2022-04-21 RX ADMIN — SODIUM CITRATE AND CITRIC ACID MONOHYDRATE 30 ML: 500; 334 SOLUTION ORAL at 06:04

## 2022-04-21 RX ADMIN — ACETAMINOPHEN 650 MG: 325 TABLET ORAL at 08:04

## 2022-04-21 RX ADMIN — PHENYLEPHRINE HYDROCHLORIDE 0.5 MCG/KG/MIN: 10 INJECTION INTRAVENOUS at 06:04

## 2022-04-21 RX ADMIN — Medication 100 MCG: at 06:04

## 2022-04-21 RX ADMIN — DOCUSATE SODIUM 200 MG: 100 CAPSULE, LIQUID FILLED ORAL at 08:04

## 2022-04-21 RX ADMIN — ACETAMINOPHEN 1000 MG: 500 TABLET, FILM COATED ORAL at 06:04

## 2022-04-21 RX ADMIN — ONDANSETRON 4 MG: 2 INJECTION INTRAMUSCULAR; INTRAVENOUS at 10:04

## 2022-04-21 RX ADMIN — KETOROLAC TROMETHAMINE 30 MG: 30 INJECTION, SOLUTION INTRAMUSCULAR at 08:04

## 2022-04-21 RX ADMIN — OXYTOCIN 3 UNITS: 10 INJECTION, SOLUTION INTRAMUSCULAR; INTRAVENOUS at 07:04

## 2022-04-21 RX ADMIN — SODIUM CHLORIDE 2 G: 9 INJECTION, SOLUTION INTRAVENOUS at 06:04

## 2022-04-21 RX ADMIN — ACETAMINOPHEN 650 MG: 325 TABLET ORAL at 02:04

## 2022-04-21 RX ADMIN — BUPIVACAINE HYDROCHLORIDE IN DEXTROSE 1.6 ML: 7.5 INJECTION, SOLUTION SUBARACHNOID at 06:04

## 2022-04-21 RX ADMIN — KETOROLAC TROMETHAMINE 30 MG: 30 INJECTION, SOLUTION INTRAMUSCULAR at 02:04

## 2022-04-21 RX ADMIN — CEFAZOLIN 2 G: 330 INJECTION, POWDER, FOR SOLUTION INTRAMUSCULAR; INTRAVENOUS at 06:04

## 2022-04-21 RX ADMIN — Medication 0.1 MG: at 06:04

## 2022-04-21 RX ADMIN — ONDANSETRON HYDROCHLORIDE 4 MG: 2 INJECTION INTRAMUSCULAR; INTRAVENOUS at 06:04

## 2022-04-21 RX ADMIN — FAMOTIDINE 20 MG: 10 INJECTION, SOLUTION INTRAVENOUS at 06:04

## 2022-04-21 RX ADMIN — Medication 95 MILLI-UNITS/MIN: at 08:04

## 2022-04-21 RX ADMIN — SODIUM CHLORIDE, SODIUM LACTATE, POTASSIUM CHLORIDE, AND CALCIUM CHLORIDE: 600; 310; 30; 20 INJECTION, SOLUTION INTRAVENOUS at 06:04

## 2022-04-21 RX ADMIN — DEXAMETHASONE SODIUM PHOSPHATE 4 MG: 4 INJECTION, SOLUTION INTRAMUSCULAR; INTRAVENOUS at 06:04

## 2022-04-21 RX ADMIN — PROCHLORPERAZINE EDISYLATE 5 MG: 5 INJECTION INTRAMUSCULAR; INTRAVENOUS at 01:04

## 2022-04-21 NOTE — INTERVAL H&P NOTE
The patient has been examined and the H&P has been reviewed:    I concur with the findings and changes have been noted since the H&P was written:      Surgery risks, benefits and alternative options discussed and understood by patient/family.    Vertex  NST cat 1 w/ irreg contractions  Vertex on US  Consented      There are no hospital problems to display for this patient.    KASHIF Alfred MD  OBGYN PGY-3

## 2022-04-21 NOTE — TRANSFER OF CARE
"Anesthesia Transfer of Care Note    Patient: Earlene Nassar    Procedure(s) Performed: Procedure(s) (LRB):   SECTION (N/A)    Patient location: PACU    Anesthesia Type: spinal    Transport from OR: Transported from OR on room air with adequate spontaneous ventilation    Post pain: adequate analgesia    Post assessment: no apparent anesthetic complications    Post vital signs: stable    Level of consciousness: awake and alert    Nausea/Vomiting: no nausea/vomiting    Complications: none    Transfer of care protocol was followed      Last vitals:   Visit Vitals  BP (!) 137/92   Pulse 95   Temp 36.9 °C (98.4 °F) (Oral)   Resp 18   Ht 5' 3" (1.6 m)   Wt 70 kg (154 lb 3.4 oz)   LMP 2021   SpO2 100%   Breastfeeding No   BMI 27.32 kg/m²     "

## 2022-04-21 NOTE — L&D DELIVERY NOTE
[facility] - Labor & Delivery   Section   Operative Note    SUMMARY      Section Operative Note  Procedure Date: 2022      Procedure: Primary  Section via Pfannenstiel skin incision    Indications: elective, h/o fourth degree tear    Pre-operative Diagnosis:   1. IUP at 37 week 0 day pregnancy  2. H/o fourth degree tear    Post-operative Diagnosis:   1. S/p pLTCS    Surgeon: Dr. Sariah Mejia     Assistants: Ca Spring, PGY1    Anesthesia: Spinal and Epidural anesthesia    Findings:   1. Viable female infant in cephalic presentation  2. Normal appearing placenta, discarded  3. Anatomically normal appearing uterus, bilateral tubes and ovaries    Estimated Blood Loss:  1360           Total IV Fluids: see anesthesia report     UOP: see anesthesia report     Specimens: single viable female infant , Placenta which was discarded    PreOp CBC:   Lab Results   Component Value Date    WBC 11.28 2022    HGB 11.3 (L) 2022    HCT 33.4 (L) 2022    MCV 90 2022     2022                     Complications:  None; patient tolerated the procedure well.           Disposition: PACU - hemodynamically stable.           Condition: stable    Procedure Details   The patient was seen in the Pre-op Holding Room. The risks, benefits, complications, treatment options, and expected outcomes were discussed with the patient.  The patient concurred with the proposed plan, giving informed consent.  The patient was taken to Operating Room, identified as Earlene Nassar and the procedure verified as  Delivery. A Time Out was held and the above information confirmed.    After induction of anesthesia, the patient was prepped and draped in the usual sterile manner while placed in a dorsal supine position with a left lateral tilt.  A morgan catheter was also placed per nursing. Preoperative antibiotics were administered and an allis test was performed yielding adequate  anesthesia.  A Pfannenstiel incision was made and carried down through the subcutaneous tissue to the fascia. Fascial incision was made and extended transversely. The fascia was grasped with Kocher clamps and  from the underlying rectus tissue superiorly and inferiorly. The peritoneum was identified, found to be free of adherent bowel and entered bluntly. Peritoneal incision was extended longitudinally. The vesico-uterine peritoneum was identified and bladder blade was inserted. The vesico-uterine peritoneum was incised transversely and the bladder flap was bluntly freed from the lower uterine segment. The bladder blade was reinserted to keep the bladder out of the operative field. A low transverse uterine incision was made with knife and extended with. The amniotic sac was ruptured bluntly and the infant was noted to be in cephalic position. The fetal head was brought to the incision and elevated out of the pelvis. The patient delivered a single viable female infant without difficulty.  Infant weighed 3110 grams with Apgar scores of 8/9 at one and five minutes respectively. After the umbilical cord was clamped and cut cord blood was obtained for evaluation. The placenta was removed intact and appeared normal and was discarded. The uterus was exteriorized. The uterine outline, tubes and ovaries appeared normal. The uterine incision was closed with running locked sutures of 1-0 chromic. The hysterotomy was then imbricated with 1-0 chromic. Hemostasis was observed. The uterus was returned to the abdominal cavity. Incision was reinspected and good hemostasis was noted. The abdominal cavity was irrigated to remove clots. The peritoneum and muscle was reapproximated with 2-0 vicryl. The fascia was then reapproximated with running sutures of 1-0 vicryl. The subcutaneous fat was reapproximated. The skin was reapproximated with 4-0 monocryl in subcuticular fashion.    Instrument, sponge, and needle counts were  "correct prior the abdominal closure and at the conclusion of the case.     Pt tolerated procedure well and was taken to recovery in stable condition    Ca Spring MD  OBGYN PGY-1      Delivery Information for Girl Earlene Nassar    Birth information:  YOB: 2020   Time of birth: 7:04 AM   Sex: female   Head Delivery Date/Time: 2022  7:04 AM   Delivery type: , Low Transverse   Gestational Age: 37w0d    Delivery Providers    Delivering clinician: Sariah Mejia MD   Provider Role    MD Radha Edgar, RN     [facility]             Measurements    Weight: 3110 g  Weight (lbs): 6 lb 13.7 oz  Length: 50.8 cm  Length (in): 20"  Head circumference: 34.3 cm  Chest circumference: 33 cm         Apgars    Living status: Living  Apgars:  1 min.:  5 min.:  10 min.:  15 min.:  20 min.:    Skin color:  0  1       Heart rate:  2  2       Reflex irritability:  2  2       Muscle tone:  2  2       Respiratory effort:  2  2       Total:  8  9       Apgars assigned by: MARA GRIER         Operative Delivery    Forceps attempted?: No  Vacuum extractor attempted?: No         Shoulder Dystocia    Shoulder dystocia present?: No           Presentation    Position: Left Occiput Anterior           Interventions/Resuscitation    Method: Bulb Suctioning, Tactile Stimulation, Deep Suctioning, CPAP       Cord    Vessels: 3 vessels  Complications: Nuchal  Nuchal Intervention: reduced  Nuchal Cord Description: loose nuchal cord  Number of Loops: 3  Delayed Cord Clamping?: Yes  Cord Clamped Date/Time: 2022  7:05 AM  Cord Blood Disposition: Sent with Baby  Gases Sent?: No  Stem Cell Collection (by MD): No       Placenta    Placenta delivery date/time: 2022 0704  Placenta removal: Manual removal  Placenta appearance: Intact  Placenta disposition: discarded           Labor Events:       labor:       Labor Onset Date/Time:         Dilation Complete Date/Time:         Start " Pushing Date/Time:         Start Pushing Date/Time:       Rupture Date/Time:            Rupture type:          Fluid Amount:       Fluid Color:       Fluid Odor:       Membrane Status:                steroids:       Antibiotics given for GBS:       Induction:       Indications for induction:        Augmentation:       Indications for augmentation:       Labor complications:       Additional complications:          Cervical ripening:                     Delivery:      Episiotomy:       Indication for Episiotomy:       Perineal Lacerations:   Repaired:      Periurethral Laceration:   Repaired:     Labial Laceration:   Repaired:     Sulcus Laceration:   Repaired:     Vaginal Laceration:   Repaired:     Cervical Laceration:   Repaired:     Repair suture:       Repair # of packets:       Last Value - EBL - Nursing (mL):       Sum - EBL - Nursing (mL): 0     Last Value - EBL - Anesthesia (mL):      Calculated QBL (mL): 180      Vaginal Sweep Performed:       Surgicount Correct:         Other providers:       Anesthesia    Method: Spinal, Epidural          Details (if applicable):  Trial of Labor No    Categorization: Primary    Priority: Routine   Indications for : Other (Add Comments)   Incision Type: low transverse     Additional  information:  Forceps:    Vacuum:    Breech:    Observed anomalies    Other (Comments): Routine primary c/s done due to pt having 4th degree tear during delivery for last baby.        Ca Spring MD  OBGYN PGY-1

## 2022-04-21 NOTE — PLAN OF CARE
Able to reposition independently and stood at bedside for a few minutes, denied feeling light-headed or dizzy; carolyn-care performed and moderate rubra noted, no clots; voiding tyrone colored urine via urethral catheter; was experiencing N/V and Zofran given at 1018 with mild relief reported, then Compazine given at 1352 and nausea improved; pain controlled with around the clock dosing; Patient bonding well with infant. Safety maintained. Will continue to monitor closely.

## 2022-04-21 NOTE — ANESTHESIA PROCEDURE NOTES
Spinal    Diagnosis: IUP  Patient location during procedure: OR  Start time: 4/21/2022 6:38 AM  Timeout: 4/21/2022 6:35 AM  End time: 4/21/2022 6:44 AM    Staffing  Authorizing Provider: Suni Rios MD  Performing Provider: Titi Mccall DO    Preanesthetic Checklist  Completed: patient identified, IV checked, site marked, risks and benefits discussed, surgical consent, monitors and equipment checked, pre-op evaluation and timeout performed  Spinal Block  Patient position: sitting  Prep: ChloraPrep  Patient monitoring: continuous pulse ox and frequent blood pressure checks  Approach: midline  Location: L3-4  Injection technique: single shot  CSF Fluid: clear free-flowing CSF  Needle  Needle type: pencil-tip   Needle gauge: 25 G  Needle length: 3.5 in  Additional Documentation: incremental injection and negative aspiration for heme  Needle localization: anatomical landmarks  Assessment  Sensory level: T5   Dermatomal levels determined by pinch or prick  Ease of block: easy  Patient's tolerance of the procedure: comfortable throughout block

## 2022-04-21 NOTE — ANESTHESIA PREPROCEDURE EVALUATION
[facility]  Anesthesia Pre-Operative Evaluation         Patient Name: Earlene Nassar  YOB: 1990  MRN: [id number]    2022      Earlene Nassar is a 31 y.o. female  at 37w0d who present for . This pregnancy has includes pre-E, GBBS, resolved low lying placenta.    Previous pregnancies resulted in spontaneous delivery with an epidural. 4th degree tear that took months to heal which is reason for patient electing to have a .     Lab Results   Component Value Date    WBC 11.28 2022    HGB 11.3 (L) 2022    HCT 33.4 (L) 2022     2022       OB History    Para Term  AB Living   2 1 1     1   SAB IAB Ectopic Multiple Live Births         0 1      # Outcome Date GA Lbr Andrew/2nd Weight Sex Delivery Anes PTL Lv   2 Current            1 Term 17 39w0d  3.402 kg (7 lb 8 oz) M Vag-Spont EPI N JJ      Birth Comments: 3rd degree laceration repair, was in pelvic floor PT for weakness      Obstetric Comments   Menarche ~14       Review of patient's allergies indicates:   Allergen Reactions    Codeine Other (See Comments)     Per pt's mother she had rash as a child but took narcotics after Tonsillectomy without problems       Wt Readings from Last 1 Encounters:   22 0941 70 kg (154 lb 3.4 oz)       BP Readings from Last 3 Encounters:   22 118/78   22 (!) 129/91   22 (!) 130/90       Patient Active Problem List   Diagnosis    Fecal urgency    Pelvic floor weakness    Hx of maternal laceration, 3rd degree, currently pregnant    Pre-eclampsia in third trimester       Past Surgical History:   Procedure Laterality Date    ADENOIDECTOMY      TONSILLECTOMY         Tobacco Use: Low Risk     Smoking Tobacco Use: Never Smoker    Smokeless Tobacco Use: Never Used     Alcohol Use: Not on file     Social History     Substance and Sexual Activity   Drug Use No         Chemistry        Component  Value Date/Time     04/12/2022 1022    K 3.7 04/12/2022 1022     04/12/2022 1022    CO2 22 (L) 04/12/2022 1022    BUN 8 04/12/2022 1022    CREATININE 0.5 04/12/2022 1022     04/12/2022 1022        Component Value Date/Time    CALCIUM 9.7 04/12/2022 1022    ALKPHOS 104 04/12/2022 1022    AST 12 04/12/2022 1022    ALT 15 04/12/2022 1022    BILITOT 0.3 04/12/2022 1022    ESTGFRAFRICA >60 04/12/2022 1022    EGFRNONAA >60 04/12/2022 1022              Pre-op Assessment    I have reviewed the Patient Summary Reports.     I have reviewed the Nursing Notes. I have reviewed the NPO Status.   I have reviewed the Medications.     Review of Systems  Anesthesia Hx:  No problems with previous Anesthesia  Denies Family Hx of Anesthesia complications.   Denies Personal Hx of Anesthesia complications.   Hematology/Oncology:  Hematology Normal   Oncology Normal     EENT/Dental:EENT/Dental Normal   Cardiovascular:   Hypertension    Pulmonary:  Pulmonary Normal    Renal/:  Renal/ Normal     Hepatic/GI:  Hepatic/GI Normal    Musculoskeletal:  Musculoskeletal Normal    Neurological:  Neurology Normal    Endocrine:  Endocrine Normal    Dermatological:  Skin Normal    Psych:  Psychiatric Normal           Physical Exam  General: Well nourished, Cooperative, Alert and Oriented    Airway:  Mallampati: I           Anesthesia Plan  Type of Anesthesia, risks & benefits discussed:    Anesthesia Type: Spinal, CSE  Intra-op Monitoring Plan: Standard ASA Monitors  Post Op Pain Control Plan: multimodal analgesia  Informed Consent: Informed consent signed with the Patient and all parties understand the risks and agree with anesthesia plan.  All questions answered.   ASA Score: 3  Day of Surgery Review of History & Physical: H&P Update referred to the surgeon/provider.    Ready For Surgery From Anesthesia Perspective.     .

## 2022-04-22 PROBLEM — O14.90 PREECLAMPSIA: Status: ACTIVE | Noted: 2022-04-07

## 2022-04-22 LAB
ALBUMIN SERPL BCP-MCNC: 2.1 G/DL (ref 3.5–5.2)
ALP SERPL-CCNC: 105 U/L (ref 55–135)
ALT SERPL W/O P-5'-P-CCNC: 10 U/L (ref 10–44)
ANION GAP SERPL CALC-SCNC: 8 MMOL/L (ref 8–16)
ANISOCYTOSIS BLD QL SMEAR: SLIGHT
AST SERPL-CCNC: 16 U/L (ref 10–40)
BASOPHILS # BLD AUTO: 0.04 K/UL (ref 0–0.2)
BASOPHILS NFR BLD: 0.2 % (ref 0–1.9)
BILIRUB SERPL-MCNC: 0.2 MG/DL (ref 0.1–1)
BUN SERPL-MCNC: 8 MG/DL (ref 6–20)
CALCIUM SERPL-MCNC: 8.6 MG/DL (ref 8.7–10.5)
CHLORIDE SERPL-SCNC: 105 MMOL/L (ref 95–110)
CO2 SERPL-SCNC: 24 MMOL/L (ref 23–29)
CREAT SERPL-MCNC: 0.5 MG/DL (ref 0.5–1.4)
DIFFERENTIAL METHOD: ABNORMAL
EOSINOPHIL # BLD AUTO: 0.1 K/UL (ref 0–0.5)
EOSINOPHIL NFR BLD: 0.4 % (ref 0–8)
ERYTHROCYTE [DISTWIDTH] IN BLOOD BY AUTOMATED COUNT: 12.9 % (ref 11.5–14.5)
EST. GFR  (AFRICAN AMERICAN): >60 ML/MIN/1.73 M^2
EST. GFR  (NON AFRICAN AMERICAN): >60 ML/MIN/1.73 M^2
GIANT PLATELETS BLD QL SMEAR: PRESENT
GLUCOSE SERPL-MCNC: 107 MG/DL (ref 70–110)
HCT VFR BLD AUTO: 25.5 % (ref 37–48.5)
HGB BLD-MCNC: 8.7 G/DL (ref 12–16)
IMM GRANULOCYTES # BLD AUTO: 0.07 K/UL (ref 0–0.04)
IMM GRANULOCYTES NFR BLD AUTO: 0.4 % (ref 0–0.5)
LYMPHOCYTES # BLD AUTO: 3.6 K/UL (ref 1–4.8)
LYMPHOCYTES NFR BLD: 21.9 % (ref 18–48)
MCH RBC QN AUTO: 30.7 PG (ref 27–31)
MCHC RBC AUTO-ENTMCNC: 34.1 G/DL (ref 32–36)
MCV RBC AUTO: 90 FL (ref 82–98)
MONOCYTES # BLD AUTO: 2.1 K/UL (ref 0.3–1)
MONOCYTES NFR BLD: 12.5 % (ref 4–15)
NEUTROPHILS # BLD AUTO: 10.6 K/UL (ref 1.8–7.7)
NEUTROPHILS NFR BLD: 64.6 % (ref 38–73)
NRBC BLD-RTO: 0 /100 WBC
PLATELET # BLD AUTO: 240 K/UL (ref 150–450)
PLATELET BLD QL SMEAR: ABNORMAL
PMV BLD AUTO: 10.8 FL (ref 9.2–12.9)
POTASSIUM SERPL-SCNC: 3.4 MMOL/L (ref 3.5–5.1)
PROT SERPL-MCNC: 5 G/DL (ref 6–8.4)
RBC # BLD AUTO: 2.83 M/UL (ref 4–5.4)
SODIUM SERPL-SCNC: 137 MMOL/L (ref 136–145)
WBC # BLD AUTO: 16.38 K/UL (ref 3.9–12.7)

## 2022-04-22 PROCEDURE — 36415 COLL VENOUS BLD VENIPUNCTURE: CPT | Performed by: OBSTETRICS & GYNECOLOGY

## 2022-04-22 PROCEDURE — 63600175 PHARM REV CODE 636 W HCPCS: Performed by: OBSTETRICS & GYNECOLOGY

## 2022-04-22 PROCEDURE — 25000003 PHARM REV CODE 250: Performed by: OBSTETRICS & GYNECOLOGY

## 2022-04-22 PROCEDURE — 80053 COMPREHEN METABOLIC PANEL: CPT | Performed by: OBSTETRICS & GYNECOLOGY

## 2022-04-22 PROCEDURE — 11000001 HC ACUTE MED/SURG PRIVATE ROOM

## 2022-04-22 PROCEDURE — 85025 COMPLETE CBC W/AUTO DIFF WBC: CPT | Performed by: OBSTETRICS & GYNECOLOGY

## 2022-04-22 RX ORDER — OXYCODONE AND ACETAMINOPHEN 5; 325 MG/1; MG/1
1 TABLET ORAL EVERY 6 HOURS PRN
Qty: 28 TABLET | Refills: 0 | Status: SHIPPED | OUTPATIENT
Start: 2022-04-22 | End: 2022-05-03

## 2022-04-22 RX ORDER — DOCUSATE SODIUM 100 MG/1
100 CAPSULE, LIQUID FILLED ORAL 2 TIMES DAILY
Qty: 60 CAPSULE | Refills: 0 | Status: SHIPPED | OUTPATIENT
Start: 2022-04-22 | End: 2022-05-03

## 2022-04-22 RX ORDER — IBUPROFEN 800 MG/1
800 TABLET ORAL EVERY 8 HOURS
Qty: 60 TABLET | Refills: 1 | Status: SHIPPED | OUTPATIENT
Start: 2022-04-22 | End: 2023-03-01

## 2022-04-22 RX ADMIN — ACETAMINOPHEN 650 MG: 325 TABLET ORAL at 02:04

## 2022-04-22 RX ADMIN — DOCUSATE SODIUM 200 MG: 100 CAPSULE, LIQUID FILLED ORAL at 08:04

## 2022-04-22 RX ADMIN — OXYCODONE 5 MG: 5 TABLET ORAL at 10:04

## 2022-04-22 RX ADMIN — IBUPROFEN 800 MG: 400 TABLET, FILM COATED ORAL at 04:04

## 2022-04-22 RX ADMIN — ACETAMINOPHEN 650 MG: 325 TABLET ORAL at 08:04

## 2022-04-22 RX ADMIN — KETOROLAC TROMETHAMINE 30 MG: 30 INJECTION, SOLUTION INTRAMUSCULAR at 02:04

## 2022-04-22 RX ADMIN — PRENATAL VIT W/ FE FUMARATE-FA TAB 27-0.8 MG 1 TABLET: 27-0.8 TAB at 08:04

## 2022-04-22 RX ADMIN — IBUPROFEN 800 MG: 400 TABLET, FILM COATED ORAL at 08:04

## 2022-04-22 RX ADMIN — ACETAMINOPHEN 650 MG: 325 TABLET ORAL at 01:04

## 2022-04-22 RX ADMIN — IBUPROFEN 800 MG: 400 TABLET, FILM COATED ORAL at 11:04

## 2022-04-22 NOTE — PROGRESS NOTES
POSTPARTUM PROGRESS NOTE    Subjective:     PPD/POD#: 1   Procedure: Primary LTCS   EGA: 37w0d   N/V: No   F/C: No   Abd Pain: Mild, well-controlled with oral pain medication   Lochia: Mild   Voiding: Yes   Ambulating: Yes   Bowel fnc: Yes   Breastfeeding: No   Contraception: Per primary OB   Circumcision: N/A, female     Objective:      Temp:  [97.6 °F (36.4 °C)-98.6 °F (37 °C)] 97.8 °F (36.6 °C)  Pulse:  [69-97] 97  Resp:  [15-18] 16  SpO2:  [95 %-99 %] 99 %  BP: (121-142)/() 135/85    Lung: Normal respiratory effort   Abdomen: Soft, appropriately tender   Uterus: Firm, no fundal tenderness   Incision: Clean, dry, and intact.  No erythema, induration, or drainage.   : Deferred   Extremities: Bilateral trace edema     Lab Review    Recent Labs   Lab 04/21/22  0527 04/22/22  0632    137   K 3.4* 3.4*    105   CO2 18* 24   BUN 7 8   CREATININE 0.5 0.5   GLU 93 107   PROT 6.4 5.0*   BILITOT 0.3 0.2   ALKPHOS 139* 105   ALT 11 10   AST 14 16       Recent Labs   Lab 04/21/22  0527 04/22/22  0438   WBC 11.28 16.38*   HGB 11.3* 8.7*   HCT 33.4* 25.5*   MCV 90 90    240         I/O    Intake/Output Summary (Last 24 hours) at 4/22/2022 0901  Last data filed at 4/22/2022 0200  Gross per 24 hour   Intake --   Output 925 ml   Net -925 ml        Assessment and Plan:   Postpartum care:  - Patient doing well.  - Continue routine management and advances.    PreE w/o SF  - BP as above  - asymptomatic  - preE labs as above  - Mag: not indicated  - Hypertensive agent not indicated at this time, will continue to monitor closely      Acute blood loss anemia (QBL 1360cc)  - H/H as above  - asymptomatic  - anemia precautions given, will continue to monitor closely      Melanie Kraft

## 2022-04-22 NOTE — PLAN OF CARE
Pt ambulating, voiding, and passing flatus. Pt tolerating PO well and there is no SS of distress at this time. Pt's pain well controlled throughout shift by oral pain medication. Pt's bleeding has been light throughout shift and fundus is firm. VSS. Will continue to monitor.

## 2022-04-22 NOTE — ANESTHESIA POSTPROCEDURE EVALUATION
Anesthesia Post Evaluation    Patient: Earlene Nassar    Procedure(s) Performed: Procedure(s) (LRB):   SECTION (N/A)    Final Anesthesia Type: spinal      Patient location during evaluation: med/surg floor  Patient participation: Yes- Able to Participate  Level of consciousness: awake and alert and oriented  Post-procedure vital signs: reviewed and stable  Pain management: adequate  Airway patency: patent    PONV status at discharge: No PONV  Anesthetic complications: no      Cardiovascular status: hemodynamically stable  Respiratory status: unassisted, spontaneous ventilation and room air  Hydration status: euvolemic  Follow-up not needed.          Vitals Value Taken Time   /84 22 1613   Temp 36.7 °C (98 °F) 22 1613   Pulse 91 22 1613   Resp 17 22 1613   SpO2 99 % 22 1613         Event Time   Out of Recovery 09:55:00         Pain/Janet Score: Pain Rating Prior to Med Admin: 3 (2022  1:47 PM)  Pain Rating Post Med Admin: 0 (2022  9:41 AM)

## 2022-04-22 NOTE — PLAN OF CARE
VSS. Uterus firm w/o massage. Bleeding continues to improve. Incision dressing clean, dry, and intact. Pt ambulating independently. Voiding spontaneously, passing flatus. Pain managed adequately w/ medication. Plan of care reviewed with pt and spouse. No new concerns expressed at this time. Will continue to monitor and intervene as necessary.

## 2022-04-23 PROCEDURE — 25000003 PHARM REV CODE 250: Performed by: OBSTETRICS & GYNECOLOGY

## 2022-04-23 PROCEDURE — 11000001 HC ACUTE MED/SURG PRIVATE ROOM

## 2022-04-23 RX ADMIN — OXYCODONE 5 MG: 5 TABLET ORAL at 08:04

## 2022-04-23 RX ADMIN — OXYCODONE 5 MG: 5 TABLET ORAL at 04:04

## 2022-04-23 RX ADMIN — IBUPROFEN 800 MG: 400 TABLET, FILM COATED ORAL at 08:04

## 2022-04-23 RX ADMIN — IBUPROFEN 800 MG: 400 TABLET, FILM COATED ORAL at 11:04

## 2022-04-23 RX ADMIN — OXYCODONE 10 MG: 5 TABLET ORAL at 02:04

## 2022-04-23 RX ADMIN — DOCUSATE SODIUM 200 MG: 100 CAPSULE, LIQUID FILLED ORAL at 08:04

## 2022-04-23 RX ADMIN — IBUPROFEN 800 MG: 400 TABLET, FILM COATED ORAL at 04:04

## 2022-04-23 RX ADMIN — ACETAMINOPHEN 650 MG: 325 TABLET ORAL at 02:04

## 2022-04-23 RX ADMIN — OXYCODONE 5 MG: 5 TABLET ORAL at 12:04

## 2022-04-23 RX ADMIN — ACETAMINOPHEN 650 MG: 325 TABLET ORAL at 08:04

## 2022-04-23 RX ADMIN — OXYCODONE 10 MG: 5 TABLET ORAL at 09:04

## 2022-04-23 RX ADMIN — PRENATAL VIT W/ FE FUMARATE-FA TAB 27-0.8 MG 1 TABLET: 27-0.8 TAB at 08:04

## 2022-04-23 RX ADMIN — DIPHENHYDRAMINE HYDROCHLORIDE 25 MG: 25 CAPSULE ORAL at 09:04

## 2022-04-23 RX ADMIN — DOCUSATE SODIUM 200 MG: 100 CAPSULE, LIQUID FILLED ORAL at 07:04

## 2022-04-23 RX ADMIN — ACETAMINOPHEN 650 MG: 325 TABLET ORAL at 07:04

## 2022-04-23 NOTE — PROGRESS NOTES
HTN education given with handout, pt states understanding and will follow up OB in clinic for a BP check.

## 2022-04-23 NOTE — PLAN OF CARE
VSS. Uterus firm w/o massage. Bleeding continues to improve. Incision w/ sutures clean, dry, and intact. Pt ambulating independently. Voiding spontaneously, passing flatus. Pain managed adequately w/ medication. Plan of care reviewed with pt and spouse. No new concerns expressed at this time. Will continue to monitor and intervene as necessary.

## 2022-04-23 NOTE — PLAN OF CARE
Pt ambulating, voiding, and passing flatus. Pt tolerating PO well and there is no SS of distress at this time. Pt's pain well controlled throughout shift by oral pain medication. Pt's bleeding has been light throughout shift and fundus is firm. VSS. Will plan for discharge tomorrow.   Pt's milk starting to come in. Educated pt on breast care when formula feeding. Gave pt ice packs for her breasts and educated pt on wearing a supportive bra. Pt. Verbalizes understanding. Will continue to monitor.

## 2022-04-23 NOTE — PROGRESS NOTES
POSTPARTUM PROGRESS NOTE    Subjective:     PPD/POD#: 2   Procedure: Primary LTCS   EGA: 37w0d   N/V: No   F/C: No   Abd Pain: Mild, well-controlled with oral pain medication   Lochia: Mild   Voiding: Yes   Ambulating: Yes   Bowel fnc: Yes   Breastfeeding: No   Contraception: Per primary OB   Circumcision: N/A, female     Objective:      Temp:  [97.8 °F (36.6 °C)-98.6 °F (37 °C)] 98 °F (36.7 °C)  Pulse:  [] 104  Resp:  [12-17] 12  SpO2:  [96 %-99 %] 96 %  BP: (119-146)/(75-92) 119/75    Lung: Normal respiratory effort   Abdomen: Soft, appropriately tender   Uterus: Firm, no fundal tenderness   Incision: Clean, dry, and intact.  No erythema, induration, or drainage.   : Deferred   Extremities: Bilateral trace edema     Lab Review    Recent Labs   Lab 04/21/22  0527 04/22/22  0632    137   K 3.4* 3.4*    105   CO2 18* 24   BUN 7 8   CREATININE 0.5 0.5   GLU 93 107   PROT 6.4 5.0*   BILITOT 0.3 0.2   ALKPHOS 139* 105   ALT 11 10   AST 14 16       Recent Labs   Lab 04/21/22 0527 04/22/22  0438   WBC 11.28 16.38*   HGB 11.3* 8.7*   HCT 33.4* 25.5*   MCV 90 90    240         I/O  No intake or output data in the 24 hours ending 04/23/22 0719     Assessment and Plan:   Postpartum care:  - Patient doing well.  - Continue routine management and advances.    PreE w/o SF  - BP as above  - asymptomatic  - preE labs as above  - Mag: not indicated  - Hypertensive agent not indicated at this time, will continue to monitor closely        Acute blood loss anemia (QBL 1360cc)  - H/H as above  - asymptomatic  - anemia precautions given, will continue to monitor closely      Elaine Foster   - morphine 1 IV q4 + PRNs for sacral ulcer pain; to be given with dressing change  - will monitor for side effects of morphine and consider adjusting dose  - watch for constipation with morphine

## 2022-04-24 VITALS
HEIGHT: 63 IN | TEMPERATURE: 99 F | RESPIRATION RATE: 18 BRPM | BODY MASS INDEX: 27.32 KG/M2 | SYSTOLIC BLOOD PRESSURE: 133 MMHG | WEIGHT: 154.19 LBS | HEART RATE: 98 BPM | DIASTOLIC BLOOD PRESSURE: 93 MMHG | OXYGEN SATURATION: 98 %

## 2022-04-24 PROCEDURE — 25000003 PHARM REV CODE 250: Performed by: OBSTETRICS & GYNECOLOGY

## 2022-04-24 RX ORDER — DIPHENHYDRAMINE HCL 25 MG
25 CAPSULE ORAL ONCE
Status: COMPLETED | OUTPATIENT
Start: 2022-04-24 | End: 2022-04-24

## 2022-04-24 RX ADMIN — DIPHENHYDRAMINE HYDROCHLORIDE 25 MG: 25 CAPSULE ORAL at 03:04

## 2022-04-24 RX ADMIN — IBUPROFEN 800 MG: 400 TABLET, FILM COATED ORAL at 06:04

## 2022-04-24 RX ADMIN — ACETAMINOPHEN 650 MG: 325 TABLET ORAL at 08:04

## 2022-04-24 RX ADMIN — PRENATAL VIT W/ FE FUMARATE-FA TAB 27-0.8 MG 1 TABLET: 27-0.8 TAB at 08:04

## 2022-04-24 RX ADMIN — DOCUSATE SODIUM 200 MG: 100 CAPSULE, LIQUID FILLED ORAL at 08:04

## 2022-04-24 RX ADMIN — ACETAMINOPHEN 650 MG: 325 TABLET ORAL at 02:04

## 2022-04-24 NOTE — PROGRESS NOTES
POSTPARTUM PROGRESS NOTE    Subjective:     PPD/POD#: 3   Procedure: Primary LTCS   EGA: 37w0d   N/V: No   F/C: No   Abd Pain: Mild, well-controlled with oral pain medication   Lochia: Mild   Voiding: Yes   Ambulating: Yes   Bowel fnc: Yes   Breastfeeding: No   Contraception: Per primary OB   Circumcision: N/A, female     Objective:      Temp:  [98.4 °F (36.9 °C)-99 °F (37.2 °C)] 98.6 °F (37 °C)  Pulse:  [] 91  Resp:  [16-18] 18  SpO2:  [97 %-99 %] 98 %  BP: (126-148)/(83-92) 148/92    Lung: Normal respiratory effort   Abdomen: Soft, appropriately tender   Uterus: Firm, no fundal tenderness   Incision: Clean, dry, and intact.  No erythema, induration, or drainage.   : Deferred   Extremities: Bilateral trace edema     Lab Review    Recent Labs   Lab 04/21/22  0527 04/22/22  0632    137   K 3.4* 3.4*    105   CO2 18* 24   BUN 7 8   CREATININE 0.5 0.5   GLU 93 107   PROT 6.4 5.0*   BILITOT 0.3 0.2   ALKPHOS 139* 105   ALT 11 10   AST 14 16       Recent Labs   Lab 04/21/22 0527 04/22/22  0438   WBC 11.28 16.38*   HGB 11.3* 8.7*   HCT 33.4* 25.5*   MCV 90 90    240         I/O  No intake or output data in the 24 hours ending 04/24/22 0912     Assessment and Plan:   Postpartum care:  - Patient doing well.  - Continue routine management and advances.  - Stable for discharge home today.     PreE w/o SF  - BP as above  - asymptomatic  - preE labs as above  - Mag: not indicated  - Hypertensive agent not indicated at this time, will plan 1 wk BP check.         Acute blood loss anemia (QBL 1360cc)  - H/H as above  - asymptomatic  - anemia precautions given, will continue to monitor closely      Melanie Kraft

## 2022-04-24 NOTE — DISCHARGE SUMMARY
Delivery Discharge Summary  Obstetrics      Primary OB Clinician: Sariah Mejia MD      Admission date: 2022  Discharge date: 2022    Disposition: To home, self care    Discharge Diagnosis List:      Patient Active Problem List   Diagnosis    Fecal urgency    Pelvic floor weakness    Hx of maternal laceration, 3rd degree, currently pregnant    Preeclampsia    Delivery by elective  section       Procedure: , due to h/o 4th degree laceration    Hospital Course:  Earlene Nassar is a 31 y.o. now , POD #3 who was admitted on 2022 at 37w0d for delivery due to preeclampsia without severe features. Patient was subsequently admitted to labor and delivery unit with signed consents. She opted for primary c/s due to h/o 4th degree laceration.  Please see delivery note for further details.     Her postpartum course was uncomplicated, and BP remained normal to mild range without requiring antihypertensives. On discharge day, patient's pain is controlled with oral pain medications. Pt is tolerating ambulation without SOB or CP, and regular diet without N/V. Reports lochia is mild. Denies any HA, vision changes, F/C, LE swelling. Denies any breast pain/soreness.    Pt in stable condition and ready for discharge. She has been instructed to start and/or continue medications and follow up with her obstetrics provider as listed below.    Pertinent studies:  CBC  Recent Labs   Lab 22  0527 22  0438   WBC 11.28 16.38*   HGB 11.3* 8.7*   HCT 33.4* 25.5*   MCV 90 90    240        Immunization History   Administered Date(s) Administered    Influenza - Quadrivalent 2016    Tdap 2017        Delivery:    Episiotomy:     Lacerations:     Repair suture:     Repair # of packets:     Blood loss (ml):       Birth information:  YOB: 2020   Time of birth: 7:04 AM   Sex: female   Delivery type: , Low Transverse   Gestational Age:  37w0d    Delivery Clinician:      Other providers:       Additional  information:  Forceps:    Vacuum:    Breech:    Observed anomalies      Living?:           APGARS  One minute Five minutes Ten minutes   Skin color:         Heart rate:         Grimace:         Muscle tone:         Breathing:         Totals: 8  9        Placenta: Delivered:       appearance      Patient Instructions:   Current Discharge Medication List      START taking these medications    Details   docusate sodium (COLACE) 100 MG capsule Take 1 capsule (100 mg total) by mouth 2 (two) times daily.  Qty: 60 capsule, Refills: 0      ibuprofen (ADVIL,MOTRIN) 800 MG tablet Take 1 tablet (800 mg total) by mouth every 8 (eight) hours.  Qty: 60 tablet, Refills: 1      oxyCODONE-acetaminophen (PERCOCET) 5-325 mg per tablet Take 1 tablet by mouth every 6 (six) hours as needed (Severe pain).  Qty: 28 tablet, Refills: 0    Comments: Quantity prescribed more than 7 day supply? No         CONTINUE these medications which have NOT CHANGED    Details   potassium chloride SA (K-DUR,KLOR-CON) 20 MEQ tablet Take 1 tablet (20 mEq total) by mouth once daily.  Qty: 30 tablet, Refills: 0      prenatal 25/iron fum/folic/dha (PRENATAL-1 ORAL) Take by mouth.             Discharge Procedure Orders   Diet Adult Regular        Follow-up Information     Sariah Mejia MD Follow up in 1 week(s).    Specialties: Obstetrics, Gynecology, Obstetrics and Gynecology  Why: BP check, Incision check  Contact information:  [address]  [address] [place] [id number] [phone]

## 2022-04-24 NOTE — PLAN OF CARE
VSS. Pain controlled with scheduled oral pain medication. Formula feeding. Fundus firm and midline with light lochia rubra. LTV incision clean & dry with sutures intact, mild bruising present. Voiding spontaneously with adequate output. Passing gas. Discharge orders in per OB. Discharge instructions and s/s of when to contact HCP reviewed, verbalized understanding. Pt to follow up in 1 week for a BP check. No concerns at this time.

## 2022-04-25 ENCOUNTER — PATIENT MESSAGE (OUTPATIENT)
Dept: OBSTETRICS AND GYNECOLOGY | Facility: CLINIC | Age: 32
End: 2022-04-25
Payer: COMMERCIAL

## 2022-04-26 ENCOUNTER — HOSPITAL ENCOUNTER (OUTPATIENT)
Facility: OTHER | Age: 32
Discharge: HOME | End: 2022-04-27
Attending: OBSTETRICS & GYNECOLOGY | Admitting: OBSTETRICS & GYNECOLOGY
Payer: COMMERCIAL

## 2022-04-26 ENCOUNTER — PATIENT MESSAGE (OUTPATIENT)
Dept: OBSTETRICS AND GYNECOLOGY | Facility: CLINIC | Age: 32
End: 2022-04-26
Payer: COMMERCIAL

## 2022-04-26 LAB
ALBUMIN SERPL BCP-MCNC: 2.5 G/DL (ref 3.5–5.2)
ALP SERPL-CCNC: 105 U/L (ref 55–135)
ALT SERPL W/O P-5'-P-CCNC: 57 U/L (ref 10–44)
ANION GAP SERPL CALC-SCNC: 13 MMOL/L (ref 8–16)
AST SERPL-CCNC: 30 U/L (ref 10–40)
BASOPHILS # BLD AUTO: 0.02 K/UL (ref 0–0.2)
BASOPHILS NFR BLD: 0.2 % (ref 0–1.9)
BILIRUB SERPL-MCNC: 0.2 MG/DL (ref 0.1–1)
BUN SERPL-MCNC: 14 MG/DL (ref 6–20)
CALCIUM SERPL-MCNC: 9.2 MG/DL (ref 8.7–10.5)
CHLORIDE SERPL-SCNC: 105 MMOL/L (ref 95–110)
CO2 SERPL-SCNC: 22 MMOL/L (ref 23–29)
CREAT SERPL-MCNC: 0.6 MG/DL (ref 0.5–1.4)
DIFFERENTIAL METHOD: ABNORMAL
EOSINOPHIL # BLD AUTO: 0.2 K/UL (ref 0–0.5)
EOSINOPHIL NFR BLD: 1.7 % (ref 0–8)
ERYTHROCYTE [DISTWIDTH] IN BLOOD BY AUTOMATED COUNT: 13.4 % (ref 11.5–14.5)
EST. GFR  (AFRICAN AMERICAN): >60 ML/MIN/1.73 M^2
EST. GFR  (NON AFRICAN AMERICAN): >60 ML/MIN/1.73 M^2
GLUCOSE SERPL-MCNC: 82 MG/DL (ref 70–110)
HCT VFR BLD AUTO: 23 % (ref 37–48.5)
HGB BLD-MCNC: 7.6 G/DL (ref 12–16)
IMM GRANULOCYTES # BLD AUTO: 0.1 K/UL (ref 0–0.04)
IMM GRANULOCYTES NFR BLD AUTO: 0.8 % (ref 0–0.5)
LYMPHOCYTES # BLD AUTO: 3 K/UL (ref 1–4.8)
LYMPHOCYTES NFR BLD: 22.4 % (ref 18–48)
MCH RBC QN AUTO: 30.2 PG (ref 27–31)
MCHC RBC AUTO-ENTMCNC: 33 G/DL (ref 32–36)
MCV RBC AUTO: 91 FL (ref 82–98)
MONOCYTES # BLD AUTO: 1.3 K/UL (ref 0.3–1)
MONOCYTES NFR BLD: 9.7 % (ref 4–15)
NEUTROPHILS # BLD AUTO: 8.6 K/UL (ref 1.8–7.7)
NEUTROPHILS NFR BLD: 65.2 % (ref 38–73)
NRBC BLD-RTO: 0 /100 WBC
PLATELET # BLD AUTO: 473 K/UL (ref 150–450)
PMV BLD AUTO: 9 FL (ref 9.2–12.9)
POTASSIUM SERPL-SCNC: 3.9 MMOL/L (ref 3.5–5.1)
PROT SERPL-MCNC: 6.4 G/DL (ref 6–8.4)
RBC # BLD AUTO: 2.52 M/UL (ref 4–5.4)
SARS-COV-2 RDRP RESP QL NAA+PROBE: NEGATIVE
SODIUM SERPL-SCNC: 140 MMOL/L (ref 136–145)
WBC # BLD AUTO: 13.16 K/UL (ref 3.9–12.7)

## 2022-04-26 PROCEDURE — G0378 HOSPITAL OBSERVATION PER HR: HCPCS

## 2022-04-26 PROCEDURE — 99285 EMERGENCY DEPT VISIT HI MDM: CPT | Mod: 25

## 2022-04-26 PROCEDURE — 99284 PR EMERGENCY DEPT VISIT,LEVEL IV: ICD-10-PCS | Mod: ,,, | Performed by: OBSTETRICS & GYNECOLOGY

## 2022-04-26 PROCEDURE — 99499 NO LOS: ICD-10-PCS | Mod: ,,, | Performed by: OBSTETRICS & GYNECOLOGY

## 2022-04-26 PROCEDURE — 99284 EMERGENCY DEPT VISIT MOD MDM: CPT | Mod: ,,, | Performed by: OBSTETRICS & GYNECOLOGY

## 2022-04-26 PROCEDURE — 25000003 PHARM REV CODE 250

## 2022-04-26 PROCEDURE — 85025 COMPLETE CBC W/AUTO DIFF WBC: CPT

## 2022-04-26 PROCEDURE — 25000003 PHARM REV CODE 250: Performed by: STUDENT IN AN ORGANIZED HEALTH CARE EDUCATION/TRAINING PROGRAM

## 2022-04-26 PROCEDURE — U0002 COVID-19 LAB TEST NON-CDC: HCPCS | Performed by: OBSTETRICS & GYNECOLOGY

## 2022-04-26 PROCEDURE — 25000003 PHARM REV CODE 250: Performed by: OBSTETRICS & GYNECOLOGY

## 2022-04-26 PROCEDURE — 99499 UNLISTED E&M SERVICE: CPT | Mod: ,,, | Performed by: OBSTETRICS & GYNECOLOGY

## 2022-04-26 PROCEDURE — 80053 COMPREHEN METABOLIC PANEL: CPT

## 2022-04-26 RX ORDER — NIFEDIPINE 10 MG/1
10 CAPSULE ORAL ONCE
Status: COMPLETED | OUTPATIENT
Start: 2022-04-26 | End: 2022-04-26

## 2022-04-26 RX ORDER — NIFEDIPINE 10 MG/1
CAPSULE ORAL
Status: COMPLETED
Start: 2022-04-26 | End: 2022-04-26

## 2022-04-26 RX ORDER — IBUPROFEN 600 MG/1
600 TABLET ORAL EVERY 6 HOURS
Status: DISCONTINUED | OUTPATIENT
Start: 2022-04-26 | End: 2022-04-27 | Stop reason: HOSPADM

## 2022-04-26 RX ORDER — SODIUM CHLORIDE 0.9 % (FLUSH) 0.9 %
10 SYRINGE (ML) INJECTION
Status: DISCONTINUED | OUTPATIENT
Start: 2022-04-26 | End: 2022-04-27 | Stop reason: HOSPADM

## 2022-04-26 RX ORDER — ONDANSETRON 8 MG/1
8 TABLET, ORALLY DISINTEGRATING ORAL EVERY 8 HOURS PRN
Status: DISCONTINUED | OUTPATIENT
Start: 2022-04-26 | End: 2022-04-27 | Stop reason: HOSPADM

## 2022-04-26 RX ORDER — NIFEDIPINE 30 MG/1
30 TABLET, EXTENDED RELEASE ORAL DAILY
Status: DISCONTINUED | OUTPATIENT
Start: 2022-04-26 | End: 2022-04-27 | Stop reason: HOSPADM

## 2022-04-26 RX ORDER — LANOLIN ALCOHOL/MO/W.PET/CERES
1 CREAM (GRAM) TOPICAL DAILY
Status: DISCONTINUED | OUTPATIENT
Start: 2022-04-27 | End: 2022-04-27 | Stop reason: HOSPADM

## 2022-04-26 RX ORDER — PROCHLORPERAZINE EDISYLATE 5 MG/ML
5 INJECTION INTRAMUSCULAR; INTRAVENOUS EVERY 6 HOURS PRN
Status: DISCONTINUED | OUTPATIENT
Start: 2022-04-26 | End: 2022-04-27 | Stop reason: HOSPADM

## 2022-04-26 RX ORDER — SIMETHICONE 80 MG
1 TABLET,CHEWABLE ORAL EVERY 6 HOURS PRN
Status: DISCONTINUED | OUTPATIENT
Start: 2022-04-26 | End: 2022-04-27 | Stop reason: HOSPADM

## 2022-04-26 RX ORDER — AMOXICILLIN 250 MG
1 CAPSULE ORAL NIGHTLY
Status: DISCONTINUED | OUTPATIENT
Start: 2022-04-26 | End: 2022-04-27 | Stop reason: HOSPADM

## 2022-04-26 RX ORDER — ACETAMINOPHEN 325 MG/1
650 TABLET ORAL EVERY 6 HOURS PRN
Status: DISCONTINUED | OUTPATIENT
Start: 2022-04-26 | End: 2022-04-27 | Stop reason: HOSPADM

## 2022-04-26 RX ORDER — NIFEDIPINE 30 MG/1
30 TABLET, EXTENDED RELEASE ORAL DAILY
Qty: 90 TABLET | Refills: 3 | Status: SHIPPED | OUTPATIENT
Start: 2022-04-26 | End: 2023-03-01

## 2022-04-26 RX ORDER — PRENATAL WITH FERROUS FUM AND FOLIC ACID 3080; 920; 120; 400; 22; 1.84; 3; 20; 10; 1; 12; 200; 27; 25; 2 [IU]/1; [IU]/1; MG/1; [IU]/1; MG/1; MG/1; MG/1; MG/1; MG/1; MG/1; UG/1; MG/1; MG/1; MG/1; MG/1
1 TABLET ORAL DAILY
Status: DISCONTINUED | OUTPATIENT
Start: 2022-04-27 | End: 2022-04-27 | Stop reason: HOSPADM

## 2022-04-26 RX ORDER — IBUPROFEN 600 MG/1
600 TABLET ORAL EVERY 6 HOURS
Status: DISCONTINUED | OUTPATIENT
Start: 2022-04-27 | End: 2022-04-26

## 2022-04-26 RX ORDER — OXYCODONE AND ACETAMINOPHEN 5; 325 MG/1; MG/1
1 TABLET ORAL EVERY 4 HOURS PRN
Status: DISCONTINUED | OUTPATIENT
Start: 2022-04-26 | End: 2022-04-27 | Stop reason: HOSPADM

## 2022-04-26 RX ADMIN — IBUPROFEN 600 MG: 600 TABLET ORAL at 10:04

## 2022-04-26 RX ADMIN — NIFEDIPINE 30 MG: 30 TABLET, FILM COATED, EXTENDED RELEASE ORAL at 07:04

## 2022-04-26 RX ADMIN — NIFEDIPINE 10 MG: 10 CAPSULE ORAL at 06:04

## 2022-04-26 RX ADMIN — NIFEDIPINE 10 MG: 10 CAPSULE ORAL at 10:04

## 2022-04-26 RX ADMIN — DOCUSATE SODIUM - SENNOSIDES 1 TABLET: 50; 8.6 TABLET, FILM COATED ORAL at 10:04

## 2022-04-26 NOTE — ED PROVIDER NOTES
Encounter Date: 2022       History     Chief Complaint   Patient presents with    Hypertension     Earlene Nassar is a 31 y.o.  on POD #5 s/p pLTCS (2/2 h/o OASIS in 1st delivery) presenting for concerns of elevated BP, GBS positive, h/o OASIS. This IUP was complicated by PreE w/o SF. She took her BP at home and had readings in the 160s/100s, prompting her to present to the [facility]. Denies headaches, vision changes, CP, SOB, RUQ pain. Lochia is mild.          Review of patient's allergies indicates:   Allergen Reactions    Codeine Other (See Comments)     Per pt's mother she had rash as a child but took narcotics after Tonsillectomy without problems     Past Medical History:   Diagnosis Date    Anxiety     Xanax prn    Asthma due to environmental allergies     Xyzal     Past Surgical History:   Procedure Laterality Date    ADENOIDECTOMY       SECTION N/A 2022    Procedure:  SECTION;  Surgeon: Sariah Mejia MD;  [facility];  Service: OB/GYN;  Laterality: N/A;  Hx 4th degree tear    TONSILLECTOMY       Family History   Problem Relation Age of Onset    No Known Problems Mother     Heart disease Father     No Known Problems Maternal Grandmother     No Known Problems Maternal Grandfather     No Known Problems Paternal Grandmother     No Known Problems Paternal Grandfather     No Known Problems Sister     Breast cancer Neg Hx     Colon cancer Neg Hx     Ovarian cancer Neg Hx     Cervical cancer Neg Hx     Endometrial cancer Neg Hx     Vaginal cancer Neg Hx      Social History     Tobacco Use    Smoking status: Never Smoker    Smokeless tobacco: Never Used   Substance Use Topics    Alcohol use: Yes     Comment: occasionally    Drug use: No     Review of Systems   Constitutional: Negative for chills and fever.   HENT: Negative for congestion and sore throat.    Eyes: Negative for visual disturbance.   Respiratory: Negative for cough and shortness of breath.     Cardiovascular: Negative for chest pain.   Gastrointestinal: Negative for abdominal pain, diarrhea, nausea and vomiting.   Genitourinary: Negative for dysuria and frequency.   Musculoskeletal: Negative for back pain.   Skin: Negative for rash.   Neurological: Negative for weakness and headaches.   Hematological: Does not bruise/bleed easily.       Physical Exam     Initial Vitals   BP Pulse Resp Temp SpO2   04/26/22 1825 04/26/22 1825 04/26/22 1825 04/26/22 1825 04/26/22 1830   (!) 175/93 85 18 98.8 °F (37.1 °C) 100 %      MAP       --                Physical Exam    Constitutional: She appears well-developed and well-nourished. No distress.   HENT:   Head: Normocephalic and atraumatic.   Eyes: EOM are normal.   Neck:   Normal range of motion.  Cardiovascular: Normal rate.   Pulmonary/Chest: No respiratory distress.   Abdominal: Abdomen is soft. There is no abdominal tenderness.   Pfannenstiel incision c/d/i There is no rebound and no guarding.   Musculoskeletal:         General: No edema.      Cervical back: Normal range of motion.     Neurological: She is alert and oriented to person, place, and time.   Skin: Skin is warm and dry.   Psychiatric: She has a normal mood and affect. Thought content normal.         ED Course   Procedures  Labs Reviewed   CBC W/ AUTO DIFFERENTIAL - Abnormal; Notable for the following components:       Result Value    WBC 13.16 (*)     RBC 2.52 (*)     Hemoglobin 7.6 (*)     Hematocrit 23.0 (*)     Platelets 473 (*)     MPV 9.0 (*)     Immature Granulocytes 0.8 (*)     Gran # (ANC) 8.6 (*)     Immature Grans (Abs) 0.10 (*)     Mono # 1.3 (*)     All other components within normal limits   COMPREHENSIVE METABOLIC PANEL - Abnormal; Notable for the following components:    CO2 22 (*)     Albumin 2.5 (*)     ALT 57 (*)     All other components within normal limits   SARS-COV-2 RNA AMPLIFICATION, QUAL          Imaging Results    None          Medications   NIFEdipine 24 hr tablet 30 mg (30  mg Oral Given 4/26/22 1929)   sodium chloride 0.9% flush 10 mL (has no administration in time range)   ondansetron disintegrating tablet 8 mg (has no administration in time range)   prochlorperazine injection Soln 5 mg (has no administration in time range)   senna-docusate 8.6-50 mg per tablet 1 tablet (1 tablet Oral Given 4/26/22 2228)   simethicone chewable tablet 80 mg (has no administration in time range)   prenatal vitamin oral tablet (has no administration in time range)   acetaminophen tablet 650 mg (has no administration in time range)   oxyCODONE-acetaminophen 5-325 mg per tablet 1 tablet (has no administration in time range)   ferrous sulfate tablet 1 each (has no administration in time range)   ibuprofen tablet 600 mg (600 mg Oral Given 4/26/22 2228)   NIFEdipine capsule 10 mg (10 mg Oral Given 4/26/22 1856)   NIFEdipine capsule 10 mg (10 mg Oral Given 4/26/22 2218)     Medical Decision Making:   ED Management:  - Afebrile  - Initial /93, repeat severe > Procardia 10 given with repeat BP mild range  - Procardia 30XL started   - CBC, CMP wnl   - Patient continued to have mild range BP with occasional severe range. Patient reports feeling anxious about discharge due to new infant at home and partner returning to work   - Due to persistently elevated BP will admit to observation for BP monitoring   - Patient voiced understanding and agreement with the plan             Attending Attestation:   Physician Attestation Statement for Resident:  As the supervising MD   Physician Attestation Statement: I have personally seen and examined this patient.   I agree with the above history. -:   As the supervising MD I agree with the above PE.    As the supervising MD I agree with the above treatment, course, plan, and disposition.   -: No severe symptoms so no indication for magnesium.  However given persistence of severe range BP will admit for Bp management.  I was personally present during the critical portions  of the procedure(s) performed by the resident and was immediately available in the ED to provide services and assistance as needed during the entire procedure.  I have reviewed and agree with the residents interpretation of the following: lab data.                         Clinical Impression:   Final diagnoses:  [O14.90] Pre-eclampsia, antepartum (Primary)          ED Disposition Condition    Observation               Mala Mcdonald MD  OBGYN, PGY-1       Mala Mcdonadl MD  Resident  04/27/22 0334       Sally Samaniego MD  04/27/22 3041

## 2022-04-27 ENCOUNTER — ANESTHESIA (OUTPATIENT)
Dept: OBSTETRICS AND GYNECOLOGY | Facility: OTHER | Age: 32
End: 2022-04-27

## 2022-04-27 ENCOUNTER — ANESTHESIA EVENT (OUTPATIENT)
Dept: OBSTETRICS AND GYNECOLOGY | Facility: OTHER | Age: 32
End: 2022-04-27

## 2022-04-27 VITALS
TEMPERATURE: 98 F | DIASTOLIC BLOOD PRESSURE: 91 MMHG | SYSTOLIC BLOOD PRESSURE: 138 MMHG | RESPIRATION RATE: 18 BRPM | HEART RATE: 106 BPM | OXYGEN SATURATION: 99 %

## 2022-04-27 LAB
ALBUMIN SERPL BCP-MCNC: 2.4 G/DL (ref 3.5–5.2)
ALP SERPL-CCNC: 104 U/L (ref 55–135)
ALT SERPL W/O P-5'-P-CCNC: 47 U/L (ref 10–44)
ANION GAP SERPL CALC-SCNC: 10 MMOL/L (ref 8–16)
AST SERPL-CCNC: 19 U/L (ref 10–40)
BILIRUB SERPL-MCNC: 0.3 MG/DL (ref 0.1–1)
BUN SERPL-MCNC: 11 MG/DL (ref 6–20)
CALCIUM SERPL-MCNC: 9.1 MG/DL (ref 8.7–10.5)
CHLORIDE SERPL-SCNC: 107 MMOL/L (ref 95–110)
CO2 SERPL-SCNC: 24 MMOL/L (ref 23–29)
CREAT SERPL-MCNC: 0.5 MG/DL (ref 0.5–1.4)
ERYTHROCYTE [DISTWIDTH] IN BLOOD BY AUTOMATED COUNT: 13.2 % (ref 11.5–14.5)
EST. GFR  (AFRICAN AMERICAN): >60 ML/MIN/1.73 M^2
EST. GFR  (NON AFRICAN AMERICAN): >60 ML/MIN/1.73 M^2
GLUCOSE SERPL-MCNC: 84 MG/DL (ref 70–110)
HCT VFR BLD AUTO: 27.5 % (ref 37–48.5)
HGB BLD-MCNC: 8.9 G/DL (ref 12–16)
MCH RBC QN AUTO: 29.5 PG (ref 27–31)
MCHC RBC AUTO-ENTMCNC: 32.4 G/DL (ref 32–36)
MCV RBC AUTO: 91 FL (ref 82–98)
PLATELET # BLD AUTO: 460 K/UL (ref 150–450)
PMV BLD AUTO: 8.5 FL (ref 9.2–12.9)
POTASSIUM SERPL-SCNC: 3.5 MMOL/L (ref 3.5–5.1)
PROT SERPL-MCNC: 6.1 G/DL (ref 6–8.4)
RBC # BLD AUTO: 3.02 M/UL (ref 4–5.4)
SODIUM SERPL-SCNC: 141 MMOL/L (ref 136–145)
WBC # BLD AUTO: 12.1 K/UL (ref 3.9–12.7)

## 2022-04-27 PROCEDURE — 25000003 PHARM REV CODE 250: Performed by: OBSTETRICS & GYNECOLOGY

## 2022-04-27 PROCEDURE — 36415 COLL VENOUS BLD VENIPUNCTURE: CPT | Performed by: OBSTETRICS & GYNECOLOGY

## 2022-04-27 PROCEDURE — 85027 COMPLETE CBC AUTOMATED: CPT | Performed by: OBSTETRICS & GYNECOLOGY

## 2022-04-27 PROCEDURE — G0378 HOSPITAL OBSERVATION PER HR: HCPCS

## 2022-04-27 PROCEDURE — 25000003 PHARM REV CODE 250: Performed by: STUDENT IN AN ORGANIZED HEALTH CARE EDUCATION/TRAINING PROGRAM

## 2022-04-27 PROCEDURE — 25000003 PHARM REV CODE 250

## 2022-04-27 PROCEDURE — 80053 COMPREHEN METABOLIC PANEL: CPT | Performed by: OBSTETRICS & GYNECOLOGY

## 2022-04-27 RX ADMIN — PRENATAL VIT W/ FE FUMARATE-FA TAB 27-0.8 MG 1 TABLET: 27-0.8 TAB at 08:04

## 2022-04-27 RX ADMIN — IBUPROFEN 600 MG: 600 TABLET ORAL at 05:04

## 2022-04-27 RX ADMIN — NIFEDIPINE 30 MG: 30 TABLET, FILM COATED, EXTENDED RELEASE ORAL at 08:04

## 2022-04-27 RX ADMIN — FERROUS SULFATE TAB 325 MG (65 MG ELEMENTAL FE) 1 EACH: 325 (65 FE) TAB at 08:04

## 2022-04-27 RX ADMIN — IBUPROFEN 600 MG: 600 TABLET ORAL at 12:04

## 2022-04-27 RX ADMIN — ACETAMINOPHEN 650 MG: 325 TABLET ORAL at 08:04

## 2022-04-27 NOTE — PROGRESS NOTES
POD #6    S: no complaints. Denies headache, RUQ pain, visual change. Had slight headache earlier that resolved with Tylenol; thinks it's due to exhaustion.    O:  Gen: AAO x 3, NAD  Vitals:    04/26/22 2145 04/27/22 0505 04/27/22 0836 04/27/22 0950   BP:  (!) 137/94 (!) 151/92 135/87   Pulse:  94 81 77   Resp:   18 18   Temp: 98.8 °F (37.1 °C)  98.1 °F (36.7 °C)    TempSrc: Oral  Oral    SpO2:  98% 98% 99%     Abdomen: soft, NT/ND  Uterus: firm, NT  Inc: healing well  Ext: no CCE    Recent Labs   Lab 04/27/22  0725   WBC 12.10   RBC 3.02*   HGB 8.9*   HCT 27.5*   *   MCV 91   MCH 29.5   MCHC 32.4     Recent Labs   Lab 04/27/22  0725   CALCIUM 9.1   PROT 6.1      K 3.5   CO2 24      BUN 11   CREATININE 0.5   ALKPHOS 104   ALT 47*   AST 19   BILITOT 0.3         A/P: POD #6, s/p primary LTCS (readmit for BP management)  - continue Procardia XL 30 mg  - will check BP this afternoon and reassess. Likely d/c home.  - needs 1 week BP check  - discussed pre-eclampsia symptoms

## 2022-04-27 NOTE — ASSESSMENT & PLAN NOTE
No severe symptoms so no indication for magnesium.  Will plan for observation admission for BP management since severe range BP continue despite procardia oral 10mg and procardia 30mg XL  Labs wnl  Will continue to monitor BP and add medication as needed for treatment of severe range

## 2022-04-27 NOTE — NURSING
AVS reviewed and provided to patient. IV removed. Pt's belongings accounted for and in pt's possession. Pt refused transport/wheelchair. Walking off unit accompanied by spouse.

## 2022-04-27 NOTE — SUBJECTIVE & OBJECTIVE
OB History    Para Term  AB Living   2 2 2 0 0 2   SAB IAB Ectopic Multiple Live Births   0 0 0 0 2      # Outcome Date GA Lbr Andrew/2nd Weight Sex Delivery Anes PTL Lv   2 Term 22 37w0d  3.11 kg (6 lb 13.7 oz) F CS-LTranv Spinal, EPI  JJ      Name: FELI MONTES      Apgar1: 8  Apgar5: 9   1 Term 17 39w0d  3.402 kg (7 lb 8 oz) M Vag-Spont EPI N JJ      Birth Comments: 3rd degree laceration repair, was in pelvic floor PT for weakness      Name: ISIS MONTES      Apgar1: 9  Apgar5: 9      Obstetric Comments   Menarche ~14     Past Medical History:   Diagnosis Date    Anxiety     Xanax prn    Asthma due to environmental allergies     Xyzal     Past Surgical History:   Procedure Laterality Date    ADENOIDECTOMY       SECTION N/A 2022    Procedure:  SECTION;  Surgeon: Sariah Mejia MD;  Location: [facility]&D;  Service: OB/GYN;  Laterality: N/A;  Hx 4th degree tear    TONSILLECTOMY         (Not in a hospital admission)      Review of patient's allergies indicates:   Allergen Reactions    Codeine Other (See Comments)     Per pt's mother she had rash as a child but took narcotics after Tonsillectomy without problems        Family History       Problem Relation (Age of Onset)    Heart disease Father    No Known Problems Mother, Maternal Grandmother, Maternal Grandfather, Paternal Grandmother, Paternal Grandfather, Sister          Tobacco Use    Smoking status: Never Smoker    Smokeless tobacco: Never Used   Substance and Sexual Activity    Alcohol use: Yes     Comment: occasionally    Drug use: No    Sexual activity: Yes     Partners: Male     Birth control/protection: None     Review of Systems   Constitutional:  Negative for activity change, appetite change, chills, diaphoresis and fever.   Eyes:  Negative for visual disturbance.   Respiratory:  Negative for cough and shortness of breath.    Cardiovascular:  Negative for chest pain, palpitations and leg  swelling.   Gastrointestinal:  Negative for abdominal pain, bloating, blood in stool, constipation, diarrhea, nausea, vomiting, reflux and fecal incontinence.   Endocrine: Negative for hot flashes.   Genitourinary:  Negative for dysuria, flank pain, frequency, vaginal bleeding, vaginal pain, urinary incontinence and vaginal odor.   Integumentary:  Negative for breast mass, nipple discharge, breast skin changes and breast tenderness.   Neurological:  Negative for seizures and headaches.   Hematological:  Does not bruise/bleed easily.   Psychiatric/Behavioral:  Negative for depression. The patient is not nervous/anxious.    Breast: Negative for asymmetry, lump, mass, mastodynia, nipple discharge, skin changes and tenderness   Objective:     Vital Signs (Most Recent):  Temp: 98.8 °F (37.1 °C) (04/26/22 1825)  Pulse: 90 (04/26/22 2044)  Resp: 18 (04/26/22 1825)  BP: (!) 156/98 (04/26/22 2044)  SpO2: 100 % (04/26/22 2014)   Vital Signs (24h Range):  Temp:  [98.8 °F (37.1 °C)] 98.8 °F (37.1 °C)  Pulse:  [] 90  Resp:  [18] 18  SpO2:  [99 %-100 %] 100 %  BP: (149-175)/() 156/98        There is no height or weight on file to calculate BMI.    Patient's last menstrual period was 08/07/2021.    Physical Exam:   Constitutional: She is oriented to person, place, and time. She appears well-developed and well-nourished. No distress.    HENT:   Head: Normocephalic and atraumatic.      Cardiovascular:  Normal rate, regular rhythm and normal heart sounds.      Exam reveals no edema.        Pulmonary/Chest: Effort normal and breath sounds normal.        Abdominal: Soft. Bowel sounds are normal. She exhibits abdominal incision (clean, dry, intact). She exhibits no distension.     Genitourinary:    Uterus normal.             Musculoskeletal: No tenderness or edema.       Neurological: She is alert and oriented to person, place, and time.    Skin: Skin is warm and dry. She is not diaphoretic.    Psychiatric: She has a normal  mood and affect. Her behavior is normal.     Laboratory:  I have personallly reviewed all pertinent lab results from the last 24 hours.    Diagnostic Results:  Labs: Reviewed

## 2022-04-27 NOTE — PROGRESS NOTES
Afternoon Assessment:    POD #6 readmit for pre-eclampsia with severe features, started on Procardia XL 30 mg QD, denies HA/RUQpain/visual change    Temp:  [98 °F (36.7 °C)-98.8 °F (37.1 °C)] 98 °F (36.7 °C)  Pulse:  [] 106  Resp:  [18] 18  SpO2:  [97 %-100 %] 99 %  BP: (130-175)/() 138/91    PE:  Abdomen: soft, NT/ND    Labs:  Recent Labs   Lab 04/27/22  0725   WBC 12.10   RBC 3.02*   HGB 8.9*   HCT 27.5*   *   MCV 91   MCH 29.5   MCHC 32.4       A/P:  - as blood pressures have responded to Procardia XL 30 mg daily and patient is asymptomatic, will discharge home on Procardia. Gave strict pre-eclampsia precautions. Patient will see Dr. Mejia in one week for BP check unless has issues before then. Patient and spouse agree with plan.

## 2022-04-27 NOTE — H&P
[facility], [place], operated by [facility] (OB)  Obstetrics & Gynecology  History & Physical    Patient Name: Earlene Nassar  MRN: [id number]  Admission Date: 2022  Primary Care Provider: Primary Doctor No    Subjective:     Chief Complaint/Reason for Admission: elevated BP    History of Present Illness:  Earlene Nassar is a 31 y.o.  on POD #5 s/p pLTCS (2/2 h/o OASIS in 1st delivery) presenting for concerns of elevated BP, GBS positive, h/o OASIS. This IUP was complicated by PreE w/o SF. She took her BP at home and had readings in the 160s/100s, prompting her to present to the [facility]. Denies headaches, vision changes, CP, SOB, RUQ pain. Lochia is mild.      Labs wnl but BP was severe multiple times and required procardia 10mg po once and procardia 30mg XL started.  No severe symptoms.          OB History    Para Term  AB Living   2 2 2 0 0 2   SAB IAB Ectopic Multiple Live Births   0 0 0 0 2      # Outcome Date GA Lbr Andrew/2nd Weight Sex Delivery Anes PTL Lv   2 Term 22 37w0d  3.11 kg (6 lb 13.7 oz) F CS-LTranv Spinal, EPI  JJ      Name: FELI NASSAR      Apgar1: 8  Apgar5: 9   1 Term 17 39w0d  3.402 kg (7 lb 8 oz) M Vag-Spont EPI N JJ      Birth Comments: 3rd degree laceration repair, was in pelvic floor PT for weakness      Name: ISIS NASSAR      Apgar1: 9  Apgar5: 9      Obstetric Comments   Menarche ~14     Past Medical History:   Diagnosis Date    Anxiety     Xanax prn    Asthma due to environmental allergies     Xyzal     Past Surgical History:   Procedure Laterality Date    ADENOIDECTOMY       SECTION N/A 2022    Procedure:  SECTION;  Surgeon: Sariah Mejia MD;  Location: [facility] L&D;  Service: OB/GYN;  Laterality: N/A;  Hx 4th degree tear    TONSILLECTOMY         (Not in a hospital admission)      Review of patient's allergies indicates:   Allergen Reactions    Codeine Other (See Comments)     Per pt's mother she had rash as a child but  took narcotics after Tonsillectomy without problems        Family History       Problem Relation (Age of Onset)    Heart disease Father    No Known Problems Mother, Maternal Grandmother, Maternal Grandfather, Paternal Grandmother, Paternal Grandfather, Sister          Tobacco Use    Smoking status: Never Smoker    Smokeless tobacco: Never Used   Substance and Sexual Activity    Alcohol use: Yes     Comment: occasionally    Drug use: No    Sexual activity: Yes     Partners: Male     Birth control/protection: None     Review of Systems   Constitutional:  Negative for activity change, appetite change, chills, diaphoresis and fever.   Eyes:  Negative for visual disturbance.   Respiratory:  Negative for cough and shortness of breath.    Cardiovascular:  Negative for chest pain, palpitations and leg swelling.   Gastrointestinal:  Negative for abdominal pain, bloating, blood in stool, constipation, diarrhea, nausea, vomiting, reflux and fecal incontinence.   Endocrine: Negative for hot flashes.   Genitourinary:  Negative for dysuria, flank pain, frequency, vaginal bleeding, vaginal pain, urinary incontinence and vaginal odor.   Integumentary:  Negative for breast mass, nipple discharge, breast skin changes and breast tenderness.   Neurological:  Negative for seizures and headaches.   Hematological:  Does not bruise/bleed easily.   Psychiatric/Behavioral:  Negative for depression. The patient is not nervous/anxious.    Breast: Negative for asymmetry, lump, mass, mastodynia, nipple discharge, skin changes and tenderness   Objective:     Vital Signs (Most Recent):  Temp: 98.8 °F (37.1 °C) (04/26/22 1825)  Pulse: 90 (04/26/22 2044)  Resp: 18 (04/26/22 1825)  BP: (!) 156/98 (04/26/22 2044)  SpO2: 100 % (04/26/22 2014)   Vital Signs (24h Range):  Temp:  [98.8 °F (37.1 °C)] 98.8 °F (37.1 °C)  Pulse:  [] 90  Resp:  [18] 18  SpO2:  [99 %-100 %] 100 %  BP: (149-175)/() 156/98        There is no height or weight on  file to calculate BMI.    Patient's last menstrual period was 2021.    Physical Exam:   Constitutional: She is oriented to person, place, and time. She appears well-developed and well-nourished. No distress.    HENT:   Head: Normocephalic and atraumatic.      Cardiovascular:  Normal rate, regular rhythm and normal heart sounds.      Exam reveals no edema.        Pulmonary/Chest: Effort normal and breath sounds normal.        Abdominal: Soft. Bowel sounds are normal. She exhibits abdominal incision (clean, dry, intact). She exhibits no distension.     Genitourinary:    Uterus normal.             Musculoskeletal: No tenderness or edema.       Neurological: She is alert and oriented to person, place, and time.    Skin: Skin is warm and dry. She is not diaphoretic.    Psychiatric: She has a normal mood and affect. Her behavior is normal.     Laboratory:  I have personallly reviewed all pertinent lab results from the last 24 hours.    Diagnostic Results:  Labs: Reviewed      Assessment/Plan:     Delivery by elective  section  Routine post operative care    Pre-eclampsia, severe, postpartum condition  No severe symptoms so no indication for magnesium.  Will plan for observation admission for BP management since severe range BP continue despite procardia oral 10mg and procardia 30mg XL  Labs wnl  Will continue to monitor BP and add medication as needed for treatment of severe range      Sally Samaniego MD  Obstetrics & Gynecology  [facility] - Emergency Dept (OB)

## 2022-04-27 NOTE — HPI
Earlene Nassar is a 31 y.o.  on POD #5 s/p pLTCS (2/2 h/o OASIS in 1st delivery) presenting for concerns of elevated BP, GBS positive, h/o OASIS. This IUP was complicated by PreE w/o SF. She took her BP at home and had readings in the 160s/100s, prompting her to present to the [facility]. Denies headaches, vision changes, CP, SOB, RUQ pain. Lochia is mild.      Labs wnl but BP was severe multiple times and required procardia 10mg po once and procardia 30mg XL started.  No severe symptoms.

## 2022-04-27 NOTE — DISCHARGE SUMMARY
[facility] - Antepartum ([facility])  Discharge Summary  Obstetrics - Triage      Admit Date: 2022    Discharge Date and Time:  2022 3:28 PM    Attending Physician: Sariah Mejia MD     Discharge Provider: Karime Carmen    Reason for Admission: POD #6 pre-eclampsia with severe features    Hospital Course (synopsis of major diagnoses, care, treatment, and services provided during the course of the hospital stay):     Earlene Nassar is a 31 y.o.  female who presented to OB ED with severe range blood pressures requiring Procardia 10 mg po x 2 and Procardia XL 30 mg po. She denied s/s pre-eclampsia on admit.     She was admitted to the labor and delivery triage area. Vital signs on admit were: Temp: 98.8 °F (37.1 °C), Pulse: 85, Resp: 18, BP: (!) 175/93 (MD Harry notified; repeat 15 minutes), SpO2: 100 %.    Laboratory review was significant for mild elevation in ALT.    Slight headache morning after admit resolved with Tylenol; patient thought it was due to just being tired.    On HD #1, patient denied HA, RUQ pain, visual change. Her BP was stable on Procardia XL 30 mg daily. Therefore she was discharged home in stable condition on regular diet. She will see Dr. Mejia in one week for BP check        Final Diagnoses:    Principal Problem: Pre-eclampsia, severe, postpartum condition   Secondary Diagnoses: s/p primary     Discharged Condition: good    Disposition: Home or Self Care    Follow Up/Patient Instructions:     Medications:  Reconciled Home Medications:      Medication List      START taking these medications    NIFEdipine 30 MG (OSM) 24 hr tablet  Commonly known as: PROCARDIA-XL  Take 1 tablet (30 mg total) by mouth once daily.        CONTINUE taking these medications    docusate sodium 100 MG capsule  Commonly known as: COLACE  Take 1 capsule (100 mg total) by mouth 2 (two) times daily.     ibuprofen 800 MG tablet  Commonly known as: ADVIL,MOTRIN  Take 1 tablet (800 mg total) by  mouth every 8 (eight) hours.     oxyCODONE-acetaminophen 5-325 mg per tablet  Commonly known as: PERCOCET  Take 1 tablet by mouth every 6 (six) hours as needed (Severe pain).     potassium chloride SA 20 MEQ tablet  Commonly known as: K-DUR,KLOR-CON  Take 1 tablet (20 mEq total) by mouth once daily.     PRENATAL-1 ORAL  Take by mouth.          No discharge procedures on file.   Follow-up Information     Sariah Mejia MD Follow up in 1 week(s).    Specialties: Obstetrics, Gynecology, Obstetrics and Gynecology  Why: BP check  Contact information:  [address]  [address]  [facility] [address] [phone]

## 2022-04-29 ENCOUNTER — PATIENT MESSAGE (OUTPATIENT)
Dept: OBSTETRICS AND GYNECOLOGY | Facility: CLINIC | Age: 32
End: 2022-04-29
Payer: COMMERCIAL

## 2022-05-01 ENCOUNTER — OFFICE VISIT (OUTPATIENT)
Dept: URGENT CARE | Facility: CLINIC | Age: 32
End: 2022-05-01
Payer: COMMERCIAL

## 2022-05-01 VITALS
DIASTOLIC BLOOD PRESSURE: 90 MMHG | TEMPERATURE: 98 F | HEIGHT: 63 IN | SYSTOLIC BLOOD PRESSURE: 132 MMHG | WEIGHT: 154 LBS | BODY MASS INDEX: 27.29 KG/M2 | OXYGEN SATURATION: 97 % | RESPIRATION RATE: 16 BRPM | HEART RATE: 91 BPM

## 2022-05-01 DIAGNOSIS — L02.91 ABSCESS: Primary | ICD-10-CM

## 2022-05-01 PROCEDURE — 1159F PR MEDICATION LIST DOCUMENTED IN MEDICAL RECORD: ICD-10-PCS | Mod: CPTII,S$GLB,, | Performed by: NURSE PRACTITIONER

## 2022-05-01 PROCEDURE — 1160F RVW MEDS BY RX/DR IN RCRD: CPT | Mod: CPTII,S$GLB,, | Performed by: NURSE PRACTITIONER

## 2022-05-01 PROCEDURE — 3008F PR BODY MASS INDEX (BMI) DOCUMENTED: ICD-10-PCS | Mod: CPTII,S$GLB,, | Performed by: NURSE PRACTITIONER

## 2022-05-01 PROCEDURE — 10060 INCISION & DRAINAGE: ICD-10-PCS | Mod: S$GLB,,, | Performed by: NURSE PRACTITIONER

## 2022-05-01 PROCEDURE — 99213 OFFICE O/P EST LOW 20 MIN: CPT | Mod: 25,S$GLB,, | Performed by: NURSE PRACTITIONER

## 2022-05-01 PROCEDURE — 1160F PR REVIEW ALL MEDS BY PRESCRIBER/CLIN PHARMACIST DOCUMENTED: ICD-10-PCS | Mod: CPTII,S$GLB,, | Performed by: NURSE PRACTITIONER

## 2022-05-01 PROCEDURE — 3080F DIAST BP >= 90 MM HG: CPT | Mod: CPTII,S$GLB,, | Performed by: NURSE PRACTITIONER

## 2022-05-01 PROCEDURE — 3075F SYST BP GE 130 - 139MM HG: CPT | Mod: CPTII,S$GLB,, | Performed by: NURSE PRACTITIONER

## 2022-05-01 PROCEDURE — 3075F PR MOST RECENT SYSTOLIC BLOOD PRESS GE 130-139MM HG: ICD-10-PCS | Mod: CPTII,S$GLB,, | Performed by: NURSE PRACTITIONER

## 2022-05-01 PROCEDURE — 99213 PR OFFICE/OUTPT VISIT, EST, LEVL III, 20-29 MIN: ICD-10-PCS | Mod: 25,S$GLB,, | Performed by: NURSE PRACTITIONER

## 2022-05-01 PROCEDURE — 3080F PR MOST RECENT DIASTOLIC BLOOD PRESSURE >= 90 MM HG: ICD-10-PCS | Mod: CPTII,S$GLB,, | Performed by: NURSE PRACTITIONER

## 2022-05-01 PROCEDURE — 10060 I&D ABSCESS SIMPLE/SINGLE: CPT | Mod: S$GLB,,, | Performed by: NURSE PRACTITIONER

## 2022-05-01 PROCEDURE — 1159F MED LIST DOCD IN RCRD: CPT | Mod: CPTII,S$GLB,, | Performed by: NURSE PRACTITIONER

## 2022-05-01 PROCEDURE — 3008F BODY MASS INDEX DOCD: CPT | Mod: CPTII,S$GLB,, | Performed by: NURSE PRACTITIONER

## 2022-05-01 RX ORDER — SULFAMETHOXAZOLE AND TRIMETHOPRIM 800; 160 MG/1; MG/1
1 TABLET ORAL 2 TIMES DAILY
Qty: 14 TABLET | Refills: 0 | Status: SHIPPED | OUTPATIENT
Start: 2022-05-01 | End: 2022-05-08

## 2022-05-01 RX ORDER — MUPIROCIN 20 MG/G
OINTMENT TOPICAL 2 TIMES DAILY
Qty: 22 G | Refills: 0 | Status: SHIPPED | OUTPATIENT
Start: 2022-05-01 | End: 2022-05-06

## 2022-05-01 NOTE — PROGRESS NOTES
"Subjective:       Patient ID: Earlene Nassar is a 31 y.o. female.    Vitals:  height is 5' 3" (1.6 m) and weight is 69.9 kg (154 lb). Her temperature is 97.9 °F (36.6 °C). Her blood pressure is 132/90 (abnormal) and her pulse is 91. Her respiration is 16 and oxygen saturation is 97%.     Chief Complaint: Abscess    Pt stated that she had a c section 10 days ago . Pt stated that she has had a recurrent abscess on her vaginal region .   Patient is not breastfeeding currently.     Abscess  Chronicity:  RecurrentProgression Since Onset: unchanged  Location:  Ano-genital  Associated Symptoms: no fever, no chills, no sweats  Characteristics: redness and swelling    Characteristics: not draining, no itching, not painful, no dryness, no scaling, no peeling, no bruising and no blistering    Pain Scale:  1/10  Treatments Tried:  None tried  Relieved by:  None tried  Worsened by:  None tried      Constitution: Negative for chills and fever.   Skin: Positive for abscess. Negative for erythema.       Objective:      Physical Exam   Constitutional: She is oriented to person, place, and time. She appears well-developed.   HENT:   Head: Normocephalic and atraumatic. Head is without abrasion, without contusion and without laceration.   Ears:   Right Ear: External ear normal.   Left Ear: External ear normal.   Nose: Nose normal.   Mouth/Throat: Oropharynx is clear and moist and mucous membranes are normal.   Eyes: Conjunctivae, EOM and lids are normal. Pupils are equal, round, and reactive to light.   Neck: Trachea normal and phonation normal. Neck supple.   Cardiovascular: Normal rate, regular rhythm and normal heart sounds.   Pulmonary/Chest: Effort normal and breath sounds normal. No stridor. No respiratory distress.   Musculoskeletal: Normal range of motion.         General: Normal range of motion.   Neurological: She is alert and oriented to person, place, and time.   Skin: Skin is warm, dry, intact, no rash and abscessed. " Capillary refill takes less than 2 seconds. No abrasion, No burn, No bruising, No erythema and No ecchymosis        Psychiatric: Her speech is normal and behavior is normal. Judgment and thought content normal.   Nursing note and vitals reviewed.        Assessment:       1. Abscess          Plan:         Abscess  -     sulfamethoxazole-trimethoprim 800-160mg (BACTRIM DS) 800-160 mg Tab; Take 1 tablet by mouth 2 (two) times daily. for 7 days  Dispense: 14 tablet; Refill: 0  -     mupirocin (BACTROBAN) 2 % ointment; Apply topically 2 (two) times daily. Apply to the affected area for 5 days  Dispense: 22 g; Refill: 0  -     Incision & Drainage                   Patient Instructions   Pull out packing on day 2, them start using antibiotic ointment.   Return for wound check if you are concerned.   Avoid baths    You must understand that you've received an Urgent Care treatment only and that you may be released before all your medical problems are known or treated. You, the patient, will arrange for follow up care as instructed.  If your condition worsens we recommend that you receive another evaluation at the emergency room immediately or contact your primary medical clinics after hours call service to discuss your concerns.  Please return here or go to the Emergency Department for any concerns or worsening of condition.

## 2022-05-01 NOTE — PATIENT INSTRUCTIONS
Pull out packing on day 2, them start using antibiotic ointment.   Return for wound check if you are concerned.   Avoid baths    You must understand that you've received an Urgent Care treatment only and that you may be released before all your medical problems are known or treated. You, the patient, will arrange for follow up care as instructed.  If your condition worsens we recommend that you receive another evaluation at the emergency room immediately or contact your primary medical clinics after hours call service to discuss your concerns.  Please return here or go to the Emergency Department for any concerns or worsening of condition.

## 2022-05-01 NOTE — PROCEDURES
"Incision & Drainage    Date/Time: 5/1/2022 10:30 AM  Performed by: Danna Isaac NP  Authorized by: Danna Isaac NP     Time out: Immediately prior to procedure a "time out" was called to verify the correct patient, procedure, equipment, support staff and site/side marked as required.    Consent Done?:  Yes (Verbal)    Type:  Abscess  Body area:  Anogenital (left groin)  Anesthesia:  Local infiltration  Local anesthetic: lidocaine 1% without epinephrine  Anesthetic total (ml):  6  Scalpel size:  11  Incision type:  Single straight  Complexity:  Simple  Drainage:  Purulent  Drainage amount:  Copious  Wound treatment:  Incision, drainage, expression of material and wound packed  Packing material:  1/2 in gauze  Patient tolerance:  Patient tolerated the procedure well with no immediate complications      "

## 2022-05-03 ENCOUNTER — POSTPARTUM VISIT (OUTPATIENT)
Dept: OBSTETRICS AND GYNECOLOGY | Facility: CLINIC | Age: 32
End: 2022-05-03
Attending: STUDENT IN AN ORGANIZED HEALTH CARE EDUCATION/TRAINING PROGRAM
Payer: COMMERCIAL

## 2022-05-03 VITALS
SYSTOLIC BLOOD PRESSURE: 122 MMHG | DIASTOLIC BLOOD PRESSURE: 74 MMHG | WEIGHT: 137.88 LBS | BODY MASS INDEX: 24.43 KG/M2 | HEIGHT: 63 IN

## 2022-05-03 DIAGNOSIS — T81.49XA INFECTED INCISION: Primary | ICD-10-CM

## 2022-05-03 PROCEDURE — 87077 CULTURE AEROBIC IDENTIFY: CPT | Performed by: STUDENT IN AN ORGANIZED HEALTH CARE EDUCATION/TRAINING PROGRAM

## 2022-05-03 PROCEDURE — 87075 CULTR BACTERIA EXCEPT BLOOD: CPT | Performed by: STUDENT IN AN ORGANIZED HEALTH CARE EDUCATION/TRAINING PROGRAM

## 2022-05-03 PROCEDURE — 87186 SC STD MICRODIL/AGAR DIL: CPT | Performed by: STUDENT IN AN ORGANIZED HEALTH CARE EDUCATION/TRAINING PROGRAM

## 2022-05-03 PROCEDURE — 0503F PR POSTPARTUM CARE VISIT: ICD-10-PCS | Mod: CPTII,S$GLB,, | Performed by: STUDENT IN AN ORGANIZED HEALTH CARE EDUCATION/TRAINING PROGRAM

## 2022-05-03 PROCEDURE — 87070 CULTURE OTHR SPECIMN AEROBIC: CPT | Performed by: STUDENT IN AN ORGANIZED HEALTH CARE EDUCATION/TRAINING PROGRAM

## 2022-05-03 PROCEDURE — 99999 PR PBB SHADOW E&M-EST. PATIENT-LVL III: CPT | Mod: PBBFAC,,, | Performed by: STUDENT IN AN ORGANIZED HEALTH CARE EDUCATION/TRAINING PROGRAM

## 2022-05-03 PROCEDURE — 99999 PR PBB SHADOW E&M-EST. PATIENT-LVL III: ICD-10-PCS | Mod: PBBFAC,,, | Performed by: STUDENT IN AN ORGANIZED HEALTH CARE EDUCATION/TRAINING PROGRAM

## 2022-05-03 PROCEDURE — 0503F POSTPARTUM CARE VISIT: CPT | Mod: CPTII,S$GLB,, | Performed by: STUDENT IN AN ORGANIZED HEALTH CARE EDUCATION/TRAINING PROGRAM

## 2022-05-05 ENCOUNTER — PATIENT MESSAGE (OUTPATIENT)
Dept: OBSTETRICS AND GYNECOLOGY | Facility: CLINIC | Age: 32
End: 2022-05-05
Payer: COMMERCIAL

## 2022-05-06 ENCOUNTER — POSTPARTUM VISIT (OUTPATIENT)
Dept: OBSTETRICS AND GYNECOLOGY | Facility: CLINIC | Age: 32
End: 2022-05-06
Attending: OBSTETRICS & GYNECOLOGY
Payer: COMMERCIAL

## 2022-05-06 ENCOUNTER — PATIENT MESSAGE (OUTPATIENT)
Dept: OBSTETRICS AND GYNECOLOGY | Facility: CLINIC | Age: 32
End: 2022-05-06
Payer: COMMERCIAL

## 2022-05-06 VITALS
BODY MASS INDEX: 24.27 KG/M2 | SYSTOLIC BLOOD PRESSURE: 122 MMHG | WEIGHT: 137 LBS | DIASTOLIC BLOOD PRESSURE: 80 MMHG | HEIGHT: 63 IN

## 2022-05-06 DIAGNOSIS — T81.49XA INFECTED INCISION: Primary | ICD-10-CM

## 2022-05-06 LAB — BACTERIA SPEC AEROBE CULT: ABNORMAL

## 2022-05-06 PROCEDURE — 99024 PR POST-OP FOLLOW-UP VISIT: ICD-10-PCS | Mod: S$GLB,,, | Performed by: OBSTETRICS & GYNECOLOGY

## 2022-05-06 PROCEDURE — 99999 PR PBB SHADOW E&M-EST. PATIENT-LVL III: ICD-10-PCS | Mod: PBBFAC,,, | Performed by: OBSTETRICS & GYNECOLOGY

## 2022-05-06 PROCEDURE — 99999 PR PBB SHADOW E&M-EST. PATIENT-LVL III: CPT | Mod: PBBFAC,,, | Performed by: OBSTETRICS & GYNECOLOGY

## 2022-05-06 PROCEDURE — 99024 POSTOP FOLLOW-UP VISIT: CPT | Mod: S$GLB,,, | Performed by: OBSTETRICS & GYNECOLOGY

## 2022-05-06 NOTE — PROGRESS NOTES
Subjective:       Patient ID: Earlene Nassar is a 31 y.o. female.    Chief Complaint:  Postpartum Care        History of Present Illness  Earlene Nassar is a 31 y.o. female  s/p primary LTCS on  has had some post partume issues  1- readmitted for elevated BP, no MgSO4, placed on BP meds and is doing well and BP is stable  2- had abscess in groin at the top of her left leg that she had even before delivery but it was small and then needed I&D post op of that area, she is on Bactrim and says it is much better.  3- the reason she called to be seen is she woke up and her c/s incision today is bigger and sticks out more that it was before.  She says she was in the car and feels her seat belt might of rubbed it in a weird way. No drainage, no fever, still on Bactrim for abscess.    Patient's last menstrual period was 2021.   Date of Last Pap: No result found    Review of Systems  Review of Systems   Constitutional: Negative for chills and fever.        Objective:   Physical Exam:   Constitutional: She appears well-developed and well-nourished.             Abdominal:   C/s incision healing well. The top part of the incision feels hard and slightly indurated and raised, but does not look infected. Not warm, no drainage.     Abscess area is healing well., still with some erythema but improved per pt                          bedside unofficial u/s above c/s scar shows no fluctuance or sign of absecss or seroma.   Will gorge  Recommend wear abdominal binder and see if that helps.   Assessment/ Plan:     1. Infected incision       Healing well  Continue bactrim  Stay on BP meds for now  Follow up in about 3 weeks (around 2022) for Postpartum visit.    As of 2021, the Cures Act has been passed nationally. This new law requires that all doctors progress notes, lab results, pathology reports and radiology reports be released IMMEDIATELY to the patient in the patient portal. That means that  the results are released to you at the EXACT same time they are released to me. Therefore, with all of the patients that I have I am not able to reply to each patient exactly when the results come in. So there will be a delay from when you see the results to when I see them and have time to come up with a response to send you. Also I only see these results when I am on the computer at work. So if the results come in over the weekend or after 5 pm of a work day, I will not see them until the next business day. As you can tell, this is a challenge as a physician to give every patient the quick response they hope for and deserve. So please be patient! Thanks for understanding, Dr. Mejia

## 2022-05-09 LAB — BACTERIA SPEC ANAEROBE CULT: NORMAL

## 2022-05-15 NOTE — PROGRESS NOTES
Subjective:      Earlene Nassar is a 31 y.o.  who presents for a postpartum visit.  She is status post 1LTCS on .  Readmitted for PreE with severe features.  Here for BP check.  Also had an abscess in her groin drained at urgent care last week.   She is not breastfeeding.  She desires no method for contraception.  She reports mood is stable.      Past Medical History:   Diagnosis Date    Anxiety     Xanax prn    Asthma due to environmental allergies     Xyzal       Current Outpatient Medications:     ibuprofen (ADVIL,MOTRIN) 800 MG tablet, Take 1 tablet (800 mg total) by mouth every 8 (eight) hours., Disp: 60 tablet, Rfl: 1    NIFEdipine (PROCARDIA-XL) 30 MG (OSM) 24 hr tablet, Take 1 tablet (30 mg total) by mouth once daily., Disp: 90 tablet, Rfl: 3      Objective:     Vitals:    22 1354   BP: 122/74       APPEARANCE: Well nourished, well developed, in no acute distress.  PSYCH: Appropriate mood and affect.  NECK:  Supple, no thyromegaly.  ABDOMEN:  Soft, nontender.  Incision C/D/I  GENITOURINARY: L groin with 2 x 2 cm abscess site, cultures collected.  EXTREMITIES: No edema.      Assessment:     Infected incision  -     Culture, Aerobic and Anaerobic w/gram Stain  -     Aerobic culture  -     Culture, Anaerobic    Postpartum care and examination    Preeclampsia in postpartum period        Plan:     1. Postpartum course reviewed and discussed with patient.  Will star bactrim for abscess.  R/B/A reviewed.  BP normotensive today.  Will wean meds as tolerated.  All questions answered.  Return to clinic in 2 weeks for follow up exam.

## 2022-05-19 ENCOUNTER — PATIENT MESSAGE (OUTPATIENT)
Dept: OBSTETRICS AND GYNECOLOGY | Facility: CLINIC | Age: 32
End: 2022-05-19
Payer: COMMERCIAL

## 2022-05-24 ENCOUNTER — PATIENT MESSAGE (OUTPATIENT)
Dept: OBSTETRICS AND GYNECOLOGY | Facility: CLINIC | Age: 32
End: 2022-05-24
Payer: COMMERCIAL

## 2022-05-24 RX ORDER — CEPHALEXIN 500 MG/1
500 CAPSULE ORAL EVERY 8 HOURS
Qty: 30 CAPSULE | Refills: 0 | Status: SHIPPED | OUTPATIENT
Start: 2022-05-24 | End: 2022-06-03

## 2022-05-26 ENCOUNTER — PATIENT MESSAGE (OUTPATIENT)
Dept: OBSTETRICS AND GYNECOLOGY | Facility: CLINIC | Age: 32
End: 2022-05-26
Payer: COMMERCIAL

## 2022-05-28 ENCOUNTER — NURSE TRIAGE (OUTPATIENT)
Dept: ADMINISTRATIVE | Facility: CLINIC | Age: 32
End: 2022-05-28
Payer: COMMERCIAL

## 2022-05-28 NOTE — TELEPHONE ENCOUNTER
Spoke with patient she delivered 5 weeks ago.  Patient currently on antibiotic therapy related to incision.  She states her incision is bleeding and has slightly opened. States opening is the size of a pea.  Current temp 97.8.  Advised ER per protocol.  Patient verbalized understanding.     Reason for Disposition   [1] Bleeding from incision AND [2] won't stop after 10 minutes of direct pressure (using correct technique)    Additional Information   Negative: [1] Wound gaping open AND [2] visible internal organs   Negative: Sounds like a life-threatening emergency to the triager    Protocols used: POSTPARTUM -  INCISION SYMPTOMS-A-AH

## 2022-05-30 ENCOUNTER — POSTPARTUM VISIT (OUTPATIENT)
Dept: OBSTETRICS AND GYNECOLOGY | Facility: CLINIC | Age: 32
End: 2022-05-30
Attending: OBSTETRICS & GYNECOLOGY
Payer: COMMERCIAL

## 2022-05-30 VITALS
HEIGHT: 63 IN | DIASTOLIC BLOOD PRESSURE: 86 MMHG | BODY MASS INDEX: 23.99 KG/M2 | WEIGHT: 135.38 LBS | SYSTOLIC BLOOD PRESSURE: 110 MMHG

## 2022-05-30 PROCEDURE — 99999 PR PBB SHADOW E&M-EST. PATIENT-LVL III: ICD-10-PCS | Mod: PBBFAC,,, | Performed by: OBSTETRICS & GYNECOLOGY

## 2022-05-30 PROCEDURE — 0503F POSTPARTUM CARE VISIT: CPT | Mod: CPTII,S$GLB,, | Performed by: OBSTETRICS & GYNECOLOGY

## 2022-05-30 PROCEDURE — 0503F PR POSTPARTUM CARE VISIT: ICD-10-PCS | Mod: CPTII,S$GLB,, | Performed by: OBSTETRICS & GYNECOLOGY

## 2022-05-30 PROCEDURE — 99999 PR PBB SHADOW E&M-EST. PATIENT-LVL III: CPT | Mod: PBBFAC,,, | Performed by: OBSTETRICS & GYNECOLOGY

## 2022-05-30 RX ORDER — SERTRALINE HYDROCHLORIDE 50 MG/1
TABLET, FILM COATED ORAL
Qty: 90 TABLET | Refills: 0 | Status: SHIPPED | OUTPATIENT
Start: 2022-05-30 | End: 2022-08-17 | Stop reason: SDUPTHER

## 2022-05-30 NOTE — Clinical Note
Ok to cancel appt with Dr. Mejia later this week. Doesn't have . She'll send BP reading to you on Wednesday.

## 2022-05-30 NOTE — PROGRESS NOTES
"32 y.o. female for postpartum visit.  Patient reports the left edge of her incision has a little drainage from it. On Saturday she had a quarter sized blood stain on her underwear. Redness has resolved with antibiotics. No fever/chills.  Her delivery records were reviewed.  She is bottle feeding infant. She declines contraception; she and  are planning a vasectomy. Patient reports she is an anxious person. She has Xanax at home to take prn. She would like to start a daily med; did well on Zoloft in the past.    Exam:  General - well appearing, no apparent distress  Vitals:    05/30/22 1042   BP: 110/86   Weight: 61.4 kg (135 lb 5.8 oz)   Height: 5' 3" (1.6 m)   PainSc: 0-No pain     Abdomen - soft, non tender, non distended   Incision - healing well, healthy granulation tissue on left aspect of incision. No s/s infection. Non-tender.  Pelvic - normal external genitalia, any lacerations well healed               uterus non tender, appropriately sized  Extremeties - no edema    PP depression score: 4    A: encounter for postpartum assessment    P:  Reassured that incision is healing well. That area is just taking a little longer. Finish antibiotic Rx and call with any issues.  - sent in Zoloft Rx.  Ok to continue Xanax prn.  - stop Procardia. Check BP on Wednesday am and send reading to Dr. Mejia in the portal.  - return when due for annual  - patient verbalized understanding and agrees with plan.        "

## 2022-06-01 ENCOUNTER — PATIENT MESSAGE (OUTPATIENT)
Dept: OBSTETRICS AND GYNECOLOGY | Facility: CLINIC | Age: 32
End: 2022-06-01
Payer: COMMERCIAL

## 2022-06-06 ENCOUNTER — PATIENT MESSAGE (OUTPATIENT)
Dept: OBSTETRICS AND GYNECOLOGY | Facility: CLINIC | Age: 32
End: 2022-06-06
Payer: COMMERCIAL

## 2022-06-06 NOTE — TELEPHONE ENCOUNTER
Continue procardia for another week and then try and get off again. Send me some blood pressures through the portal also

## 2022-06-22 ENCOUNTER — PATIENT MESSAGE (OUTPATIENT)
Dept: OBSTETRICS AND GYNECOLOGY | Facility: CLINIC | Age: 32
End: 2022-06-22
Payer: COMMERCIAL

## 2022-08-05 ENCOUNTER — TELEPHONE (OUTPATIENT)
Dept: FAMILY MEDICINE | Facility: CLINIC | Age: 32
End: 2022-08-05
Payer: COMMERCIAL

## 2022-09-09 ENCOUNTER — OFFICE VISIT (OUTPATIENT)
Dept: FAMILY MEDICINE | Facility: CLINIC | Age: 32
End: 2022-09-09
Payer: COMMERCIAL

## 2022-09-09 ENCOUNTER — LAB VISIT (OUTPATIENT)
Dept: LAB | Facility: HOSPITAL | Age: 32
End: 2022-09-09
Payer: COMMERCIAL

## 2022-09-09 VITALS
TEMPERATURE: 99 F | SYSTOLIC BLOOD PRESSURE: 100 MMHG | OXYGEN SATURATION: 98 % | WEIGHT: 131.75 LBS | BODY MASS INDEX: 23.33 KG/M2 | HEART RATE: 72 BPM | DIASTOLIC BLOOD PRESSURE: 80 MMHG

## 2022-09-09 DIAGNOSIS — Z00.00 ROUTINE MEDICAL EXAM: ICD-10-CM

## 2022-09-09 DIAGNOSIS — F41.1 ANXIETY, GENERALIZED: ICD-10-CM

## 2022-09-09 DIAGNOSIS — Z00.00 ROUTINE MEDICAL EXAM: Primary | ICD-10-CM

## 2022-09-09 LAB
ALBUMIN SERPL BCP-MCNC: 4.2 G/DL (ref 3.5–5.2)
ALP SERPL-CCNC: 68 U/L (ref 55–135)
ALT SERPL W/O P-5'-P-CCNC: 20 U/L (ref 10–44)
ANION GAP SERPL CALC-SCNC: 10 MMOL/L (ref 8–16)
AST SERPL-CCNC: 19 U/L (ref 10–40)
BASOPHILS # BLD AUTO: 0.02 K/UL (ref 0–0.2)
BASOPHILS NFR BLD: 0.4 % (ref 0–1.9)
BILIRUB SERPL-MCNC: 0.4 MG/DL (ref 0.1–1)
BUN SERPL-MCNC: 12 MG/DL (ref 6–20)
CALCIUM SERPL-MCNC: 9.9 MG/DL (ref 8.7–10.5)
CHLORIDE SERPL-SCNC: 105 MMOL/L (ref 95–110)
CHOLEST SERPL-MCNC: 232 MG/DL (ref 120–199)
CHOLEST/HDLC SERPL: 3.9 {RATIO} (ref 2–5)
CO2 SERPL-SCNC: 24 MMOL/L (ref 23–29)
CREAT SERPL-MCNC: 0.7 MG/DL (ref 0.5–1.4)
DIFFERENTIAL METHOD: ABNORMAL
EOSINOPHIL # BLD AUTO: 0.1 K/UL (ref 0–0.5)
EOSINOPHIL NFR BLD: 1.1 % (ref 0–8)
ERYTHROCYTE [DISTWIDTH] IN BLOOD BY AUTOMATED COUNT: 16.1 % (ref 11.5–14.5)
EST. GFR  (NO RACE VARIABLE): >60 ML/MIN/1.73 M^2
GLUCOSE SERPL-MCNC: 88 MG/DL (ref 70–110)
HCT VFR BLD AUTO: 40 % (ref 37–48.5)
HCV AB SERPL QL IA: NORMAL
HDLC SERPL-MCNC: 60 MG/DL (ref 40–75)
HDLC SERPL: 25.9 % (ref 20–50)
HGB BLD-MCNC: 12.5 G/DL (ref 12–16)
IMM GRANULOCYTES # BLD AUTO: 0 K/UL (ref 0–0.04)
IMM GRANULOCYTES NFR BLD AUTO: 0 % (ref 0–0.5)
LDLC SERPL CALC-MCNC: 155.8 MG/DL (ref 63–159)
LYMPHOCYTES # BLD AUTO: 2.2 K/UL (ref 1–4.8)
LYMPHOCYTES NFR BLD: 41.9 % (ref 18–48)
MCH RBC QN AUTO: 25.3 PG (ref 27–31)
MCHC RBC AUTO-ENTMCNC: 31.3 G/DL (ref 32–36)
MCV RBC AUTO: 81 FL (ref 82–98)
MONOCYTES # BLD AUTO: 0.9 K/UL (ref 0.3–1)
MONOCYTES NFR BLD: 17.4 % (ref 4–15)
NEUTROPHILS # BLD AUTO: 2.1 K/UL (ref 1.8–7.7)
NEUTROPHILS NFR BLD: 39.2 % (ref 38–73)
NONHDLC SERPL-MCNC: 172 MG/DL
NRBC BLD-RTO: 0 /100 WBC
PLATELET # BLD AUTO: 376 K/UL (ref 150–450)
PMV BLD AUTO: 10.3 FL (ref 9.2–12.9)
POTASSIUM SERPL-SCNC: 4.2 MMOL/L (ref 3.5–5.1)
PROT SERPL-MCNC: 8 G/DL (ref 6–8.4)
RBC # BLD AUTO: 4.95 M/UL (ref 4–5.4)
SODIUM SERPL-SCNC: 139 MMOL/L (ref 136–145)
TRIGL SERPL-MCNC: 81 MG/DL (ref 30–150)
TSH SERPL DL<=0.005 MIU/L-ACNC: 1.05 UIU/ML (ref 0.4–4)
WBC # BLD AUTO: 5.28 K/UL (ref 3.9–12.7)

## 2022-09-09 PROCEDURE — 3008F BODY MASS INDEX DOCD: CPT | Mod: CPTII,S$GLB,, | Performed by: NURSE PRACTITIONER

## 2022-09-09 PROCEDURE — 85025 COMPLETE CBC W/AUTO DIFF WBC: CPT | Performed by: NURSE PRACTITIONER

## 2022-09-09 PROCEDURE — 3079F DIAST BP 80-89 MM HG: CPT | Mod: CPTII,S$GLB,, | Performed by: NURSE PRACTITIONER

## 2022-09-09 PROCEDURE — 3074F SYST BP LT 130 MM HG: CPT | Mod: CPTII,S$GLB,, | Performed by: NURSE PRACTITIONER

## 2022-09-09 PROCEDURE — 3079F PR MOST RECENT DIASTOLIC BLOOD PRESSURE 80-89 MM HG: ICD-10-PCS | Mod: CPTII,S$GLB,, | Performed by: NURSE PRACTITIONER

## 2022-09-09 PROCEDURE — 80053 COMPREHEN METABOLIC PANEL: CPT | Performed by: NURSE PRACTITIONER

## 2022-09-09 PROCEDURE — 3074F PR MOST RECENT SYSTOLIC BLOOD PRESSURE < 130 MM HG: ICD-10-PCS | Mod: CPTII,S$GLB,, | Performed by: NURSE PRACTITIONER

## 2022-09-09 PROCEDURE — 1159F PR MEDICATION LIST DOCUMENTED IN MEDICAL RECORD: ICD-10-PCS | Mod: CPTII,S$GLB,, | Performed by: NURSE PRACTITIONER

## 2022-09-09 PROCEDURE — 36415 COLL VENOUS BLD VENIPUNCTURE: CPT | Mod: PO | Performed by: NURSE PRACTITIONER

## 2022-09-09 PROCEDURE — 80061 LIPID PANEL: CPT | Performed by: NURSE PRACTITIONER

## 2022-09-09 PROCEDURE — 86803 HEPATITIS C AB TEST: CPT | Performed by: NURSE PRACTITIONER

## 2022-09-09 PROCEDURE — 99395 PR PREVENTIVE VISIT,EST,18-39: ICD-10-PCS | Mod: S$GLB,,, | Performed by: NURSE PRACTITIONER

## 2022-09-09 PROCEDURE — 99395 PREV VISIT EST AGE 18-39: CPT | Mod: S$GLB,,, | Performed by: NURSE PRACTITIONER

## 2022-09-09 PROCEDURE — 99999 PR PBB SHADOW E&M-EST. PATIENT-LVL IV: ICD-10-PCS | Mod: PBBFAC,,, | Performed by: NURSE PRACTITIONER

## 2022-09-09 PROCEDURE — 3008F PR BODY MASS INDEX (BMI) DOCUMENTED: ICD-10-PCS | Mod: CPTII,S$GLB,, | Performed by: NURSE PRACTITIONER

## 2022-09-09 PROCEDURE — 1160F PR REVIEW ALL MEDS BY PRESCRIBER/CLIN PHARMACIST DOCUMENTED: ICD-10-PCS | Mod: CPTII,S$GLB,, | Performed by: NURSE PRACTITIONER

## 2022-09-09 PROCEDURE — 1159F MED LIST DOCD IN RCRD: CPT | Mod: CPTII,S$GLB,, | Performed by: NURSE PRACTITIONER

## 2022-09-09 PROCEDURE — 84443 ASSAY THYROID STIM HORMONE: CPT | Performed by: NURSE PRACTITIONER

## 2022-09-09 PROCEDURE — 1160F RVW MEDS BY RX/DR IN RCRD: CPT | Mod: CPTII,S$GLB,, | Performed by: NURSE PRACTITIONER

## 2022-09-09 PROCEDURE — 99999 PR PBB SHADOW E&M-EST. PATIENT-LVL IV: CPT | Mod: PBBFAC,,, | Performed by: NURSE PRACTITIONER

## 2022-09-09 NOTE — PROGRESS NOTES
History of Present Illness   Earlene Nassar is a 32 y.o. woman with medical history as listed below who presents today for routine physical exam. She did not have labs prior to our visit. Of note, she had  delivery secondary to pre-eclampsia with elevated blood pressures that continued in to post-partum period. She is off all antihypertensive medications currently, and BPs have remained normal. She also resumed Zoloft given post-partum anxiety and feels this is working well with no perceived SE. She is followed closely by OBGYN. Complaints today: None. Pertinent negatives as listed in ROS. We will address HM today.    Past Medical History:   Diagnosis Date    Anxiety     Xanax prn    Asthma due to environmental allergies     Xyzal       Past Surgical History:   Procedure Laterality Date    ADENOIDECTOMY       SECTION N/A 2022    Procedure:  SECTION;  Surgeon: Sariah Mejia MD;  Location: [facility] L&D;  Service: OB/GYN;  Laterality: N/A;  Hx 4th degree tear    TONSILLECTOMY         Social History     Socioeconomic History    Marital status:    Tobacco Use    Smoking status: Never    Smokeless tobacco: Never   Substance and Sexual Activity    Alcohol use: Yes     Comment: occasionally    Drug use: No    Sexual activity: Yes     Partners: Male     Birth control/protection: None     Social Determinants of Health     Financial Resource Strain: Low Risk     Difficulty of Paying Living Expenses: Not hard at all   Food Insecurity: No Food Insecurity    Worried About Running Out of Food in the Last Year: Never true    Ran Out of Food in the Last Year: Never true   Transportation Needs: No Transportation Needs    Lack of Transportation (Medical): No    Lack of Transportation (Non-Medical): No   Physical Activity: Insufficiently Active    Days of Exercise per Week: 2 days    Minutes of Exercise per Session: 40 min   Stress: Stress Concern Present    Feeling of Stress : To some extent    Social Connections: Unknown    Frequency of Communication with Friends and Family: More than three times a week    Frequency of Social Gatherings with Friends and Family: Three times a week    Active Member of Clubs or Organizations: No    Attends Club or Organization Meetings: Patient refused    Marital Status:    Housing Stability: Low Risk     Unable to Pay for Housing in the Last Year: No    Number of Places Lived in the Last Year: 1    Unstable Housing in the Last Year: No       Family History   Problem Relation Age of Onset    No Known Problems Mother     Heart disease Father     No Known Problems Maternal Grandmother     No Known Problems Maternal Grandfather     No Known Problems Paternal Grandmother     No Known Problems Paternal Grandfather     No Known Problems Sister     Breast cancer Neg Hx     Colon cancer Neg Hx     Ovarian cancer Neg Hx     Cervical cancer Neg Hx     Endometrial cancer Neg Hx     Vaginal cancer Neg Hx        Review of Systems  Review of Systems   Constitutional:  Negative for malaise/fatigue and weight loss.   HENT:  Negative for hearing loss and tinnitus.    Eyes:  Negative for blurred vision and double vision.   Respiratory:  Negative for shortness of breath and wheezing.    Cardiovascular:  Negative for chest pain, palpitations, orthopnea, claudication and leg swelling.   Gastrointestinal:  Negative for blood in stool and melena.   Genitourinary:  Negative for flank pain and hematuria.   Musculoskeletal:  Negative for back pain, joint pain and neck pain.   Skin:  Negative for itching and rash.   Neurological:  Negative for dizziness, loss of consciousness and headaches.   Endo/Heme/Allergies:  Negative for polydipsia.   Psychiatric/Behavioral:  Negative for depression. The patient is not nervous/anxious and does not have insomnia.      A complete review of systems was otherwise negative.    Physical Exam  /80 (BP Location: Right arm, Patient Position: Sitting, BP  Method: Medium (Manual))   Pulse 72   Temp 98.7 °F (37.1 °C) (Oral)   Wt 59.8 kg (131 lb 11.6 oz)   LMP 08/09/2022 (Approximate)   SpO2 98%   BMI 23.33 kg/m²   General appearance: alert, appears stated age, cooperative, and no distress  Head: Normocephalic, without obvious abnormality, atraumatic  Eyes: negative findings: lids and lashes normal and conjunctivae and sclerae normal  Ears: normal TM's and external ear canals both ears  Nose: Nares normal. Septum midline. Mucosa normal. No drainage or sinus tenderness.  Throat: lips, mucosa, and tongue normal; teeth and gums normal  Neck: no adenopathy, no carotid bruit, no JVD, supple, symmetrical, trachea midline, and thyroid not enlarged, symmetric, no tenderness/mass/nodules  Back: symmetric, no curvature. ROM normal. No CVA tenderness.  Lungs: clear to auscultation bilaterally  Heart: regular rate and rhythm, S1, S2 normal, no murmur, click, rub or gallop  Abdomen: soft, non-tender; bowel sounds normal; no masses,  no organomegaly  Extremities: extremities normal, atraumatic, no cyanosis or edema  Pulses: 2+ and symmetric  Skin: Skin color, texture, turgor normal. No rashes or lesions  Lymph nodes: Cervical, supraclavicular, and axillary nodes normal.  Neurologic: Grossly normal    Assessment/Plan  Routine medical exam  Age appropriate screening recommendations and HM were discussed and updated.  Discussed importance of heart healthy diet and exercise.  Check labs as below.  Routine physical in one year.  -     CBC Auto Differential; Future; Expected date: 09/09/2022  -     Comprehensive Metabolic Panel; Future; Expected date: 09/09/2022  -     Lipid Panel; Future; Expected date: 09/09/2022  -     TSH; Future; Expected date: 09/09/2022  -     Hepatitis C Antibody; Future; Expected date: 09/09/2022    Anxiety, generalized  The current medical regimen is effective;  continue present plan and medications.  Doing well on Zoloft post-partum.  She will contact me  should she want to taper off of SSRI therapy.    Pre-eclampsia, severe, postpartum condition  Resolved, doing well off of Procardia.  BP WNL today.    Postpartum anemia  Likely resolved, check CBC today for trending.    Patient has verbalized understanding and is in agreement with plan of care.    Follow up in about 1 year (around 9/9/2023) for routine physical or sooner PRN.

## 2022-09-13 ENCOUNTER — PATIENT MESSAGE (OUTPATIENT)
Dept: FAMILY MEDICINE | Facility: CLINIC | Age: 32
End: 2022-09-13
Payer: COMMERCIAL

## 2022-10-10 PROBLEM — O09.299 HX OF MATERNAL LACERATION, 3RD DEGREE, CURRENTLY PREGNANT: Status: RESOLVED | Noted: 2021-11-02 | Resolved: 2022-10-10

## 2022-10-24 RX ORDER — SERTRALINE HYDROCHLORIDE 50 MG/1
TABLET, FILM COATED ORAL
Qty: 90 TABLET | Refills: 0 | OUTPATIENT
Start: 2022-10-24

## 2023-02-28 ENCOUNTER — PATIENT MESSAGE (OUTPATIENT)
Dept: FAMILY MEDICINE | Facility: CLINIC | Age: 33
End: 2023-02-28
Payer: COMMERCIAL

## 2023-03-01 ENCOUNTER — PATIENT MESSAGE (OUTPATIENT)
Dept: FAMILY MEDICINE | Facility: CLINIC | Age: 33
End: 2023-03-01
Payer: COMMERCIAL

## 2023-03-01 ENCOUNTER — OFFICE VISIT (OUTPATIENT)
Dept: FAMILY MEDICINE | Facility: CLINIC | Age: 33
End: 2023-03-01
Payer: COMMERCIAL

## 2023-03-01 VITALS
BODY MASS INDEX: 23.55 KG/M2 | SYSTOLIC BLOOD PRESSURE: 112 MMHG | HEIGHT: 63 IN | TEMPERATURE: 99 F | DIASTOLIC BLOOD PRESSURE: 82 MMHG | WEIGHT: 132.94 LBS | HEART RATE: 80 BPM | OXYGEN SATURATION: 99 %

## 2023-03-01 DIAGNOSIS — J01.90 ACUTE BACTERIAL SINUSITIS: Primary | ICD-10-CM

## 2023-03-01 DIAGNOSIS — H68.009 EUSTACHIAN CATARRH, UNSPECIFIED LATERALITY: ICD-10-CM

## 2023-03-01 DIAGNOSIS — B96.89 ACUTE BACTERIAL SINUSITIS: Primary | ICD-10-CM

## 2023-03-01 DIAGNOSIS — F41.9 ANXIETY: Chronic | ICD-10-CM

## 2023-03-01 PROBLEM — R15.2 FECAL URGENCY: Status: RESOLVED | Noted: 2017-05-03 | Resolved: 2023-03-01

## 2023-03-01 PROCEDURE — 1160F RVW MEDS BY RX/DR IN RCRD: CPT | Mod: CPTII,S$GLB,, | Performed by: FAMILY MEDICINE

## 2023-03-01 PROCEDURE — 3074F SYST BP LT 130 MM HG: CPT | Mod: CPTII,S$GLB,, | Performed by: FAMILY MEDICINE

## 2023-03-01 PROCEDURE — 99213 PR OFFICE/OUTPT VISIT, EST, LEVL III, 20-29 MIN: ICD-10-PCS | Mod: S$GLB,,, | Performed by: FAMILY MEDICINE

## 2023-03-01 PROCEDURE — 3079F PR MOST RECENT DIASTOLIC BLOOD PRESSURE 80-89 MM HG: ICD-10-PCS | Mod: CPTII,S$GLB,, | Performed by: FAMILY MEDICINE

## 2023-03-01 PROCEDURE — 99213 OFFICE O/P EST LOW 20 MIN: CPT | Mod: S$GLB,,, | Performed by: FAMILY MEDICINE

## 2023-03-01 PROCEDURE — 99999 PR PBB SHADOW E&M-EST. PATIENT-LVL IV: ICD-10-PCS | Mod: PBBFAC,,, | Performed by: FAMILY MEDICINE

## 2023-03-01 PROCEDURE — 3008F BODY MASS INDEX DOCD: CPT | Mod: CPTII,S$GLB,, | Performed by: FAMILY MEDICINE

## 2023-03-01 PROCEDURE — 99999 PR PBB SHADOW E&M-EST. PATIENT-LVL IV: CPT | Mod: PBBFAC,,, | Performed by: FAMILY MEDICINE

## 2023-03-01 PROCEDURE — 1160F PR REVIEW ALL MEDS BY PRESCRIBER/CLIN PHARMACIST DOCUMENTED: ICD-10-PCS | Mod: CPTII,S$GLB,, | Performed by: FAMILY MEDICINE

## 2023-03-01 PROCEDURE — 1159F MED LIST DOCD IN RCRD: CPT | Mod: CPTII,S$GLB,, | Performed by: FAMILY MEDICINE

## 2023-03-01 PROCEDURE — 3079F DIAST BP 80-89 MM HG: CPT | Mod: CPTII,S$GLB,, | Performed by: FAMILY MEDICINE

## 2023-03-01 PROCEDURE — 3008F PR BODY MASS INDEX (BMI) DOCUMENTED: ICD-10-PCS | Mod: CPTII,S$GLB,, | Performed by: FAMILY MEDICINE

## 2023-03-01 PROCEDURE — 3074F PR MOST RECENT SYSTOLIC BLOOD PRESSURE < 130 MM HG: ICD-10-PCS | Mod: CPTII,S$GLB,, | Performed by: FAMILY MEDICINE

## 2023-03-01 PROCEDURE — 1159F PR MEDICATION LIST DOCUMENTED IN MEDICAL RECORD: ICD-10-PCS | Mod: CPTII,S$GLB,, | Performed by: FAMILY MEDICINE

## 2023-03-01 RX ORDER — AZELASTINE 1 MG/ML
2 SPRAY, METERED NASAL 2 TIMES DAILY
Qty: 30 ML | Refills: 0 | Status: SHIPPED | OUTPATIENT
Start: 2023-03-01 | End: 2023-03-24

## 2023-03-01 RX ORDER — METHYLPREDNISOLONE 4 MG/1
TABLET ORAL
Qty: 1 EACH | Refills: 0 | Status: SHIPPED | OUTPATIENT
Start: 2023-03-01 | End: 2023-05-18

## 2023-03-01 RX ORDER — DOXYCYCLINE 100 MG/1
100 CAPSULE ORAL 2 TIMES DAILY
Qty: 20 CAPSULE | Refills: 0 | Status: SHIPPED | OUTPATIENT
Start: 2023-03-01 | End: 2023-03-01 | Stop reason: SDUPTHER

## 2023-03-01 RX ORDER — FLUCONAZOLE 150 MG/1
TABLET ORAL
Qty: 2 TABLET | Refills: 0 | Status: SHIPPED | OUTPATIENT
Start: 2023-03-01 | End: 2023-05-18

## 2023-03-01 RX ORDER — FLUTICASONE PROPIONATE 50 MCG
2 SPRAY, SUSPENSION (ML) NASAL DAILY
Qty: 16 ML | Refills: 0 | Status: SHIPPED | OUTPATIENT
Start: 2023-03-01 | End: 2023-03-01 | Stop reason: SDUPTHER

## 2023-03-01 RX ORDER — DOXYCYCLINE 100 MG/1
100 CAPSULE ORAL 2 TIMES DAILY
Qty: 20 CAPSULE | Refills: 0 | Status: SHIPPED | OUTPATIENT
Start: 2023-03-01 | End: 2023-03-11

## 2023-03-01 RX ORDER — FLUTICASONE PROPIONATE 50 MCG
2 SPRAY, SUSPENSION (ML) NASAL DAILY
Qty: 16 ML | Refills: 0 | Status: SHIPPED | OUTPATIENT
Start: 2023-03-01 | End: 2023-03-24

## 2023-03-01 RX ORDER — METHYLPREDNISOLONE 4 MG/1
TABLET ORAL
Qty: 1 EACH | Refills: 0 | Status: SHIPPED | OUTPATIENT
Start: 2023-03-01 | End: 2023-03-01 | Stop reason: SDUPTHER

## 2023-03-01 RX ORDER — AZELASTINE 1 MG/ML
2 SPRAY, METERED NASAL 2 TIMES DAILY
Qty: 30 ML | Refills: 0 | Status: SHIPPED | OUTPATIENT
Start: 2023-03-01 | End: 2023-03-01 | Stop reason: SDUPTHER

## 2023-03-01 NOTE — PROGRESS NOTES
Subjective:       Patient ID: Earlene Nassar is a 32 y.o. female.    Chief Complaint: Cough    COVID testing on Saturday 2/26/2023 was negative.    Cough  This is a new problem. Episode onset: started 10 days ago. The problem has been unchanged. Cough characteristics: yellow mucus. Associated symptoms include ear pain (right side), a fever (low 100s), nasal congestion, postnasal drip and a sore throat. Pertinent negatives include no chills (none since the weekend), eye redness, rash, rhinorrhea, shortness of breath or wheezing. Treatments tried: Dayquil/Nyquil- helps a bit, but not much. There is no history of asthma or COPD.   Review of Systems   Constitutional:  Positive for appetite change (decrease), diaphoresis and fever (low 100s). Negative for chills (none since the weekend).   HENT:  Positive for nasal congestion, ear pain (right side), postnasal drip, sinus pressure/congestion and sore throat. Negative for rhinorrhea and trouble swallowing.    Eyes:  Negative for discharge, redness and itching.   Respiratory:  Positive for cough. Negative for shortness of breath and wheezing.    Gastrointestinal:  Negative for blood in stool, change in bowel habit, constipation, diarrhea and change in bowel habit.   Genitourinary:  Negative for dysuria and hematuria.   Integumentary:  Negative for rash.       Objective:      Physical Exam  Constitutional:       General: She is not in acute distress.     Appearance: She is ill-appearing. She is not toxic-appearing.   HENT:      Head: Normocephalic and atraumatic.      Right Ear: External ear normal. A middle ear effusion is present.      Left Ear: External ear normal. A middle ear effusion is present.      Nose: Mucosal edema and rhinorrhea present.      Left Nostril: Occlusion present.      Right Turbinates: Enlarged.      Left Turbinates: Enlarged.      Right Sinus: No maxillary sinus tenderness or frontal sinus tenderness.      Left Sinus: Maxillary sinus tenderness  present. No frontal sinus tenderness.      Mouth/Throat:      Pharynx: Uvula midline. Posterior oropharyngeal erythema present. No oropharyngeal exudate.   Eyes:      General: Lids are normal.      Conjunctiva/sclera:      Right eye: Right conjunctiva is injected.      Left eye: Left conjunctiva is injected.      Pupils: Pupils are equal, round, and reactive to light.   Neck:      Trachea: Trachea normal.   Cardiovascular:      Rate and Rhythm: Normal rate and regular rhythm.      Heart sounds: S1 normal and S2 normal.   Pulmonary:      Effort: Pulmonary effort is normal. No respiratory distress.      Breath sounds: Normal breath sounds. No wheezing, rhonchi or rales.   Abdominal:      General: Bowel sounds are normal. There is no distension.      Palpations: Abdomen is soft.      Tenderness: There is no abdominal tenderness.   Musculoskeletal:      Cervical back: Normal range of motion and neck supple.   Neurological:      Mental Status: She is alert.   Psychiatric:         Behavior: Behavior is cooperative.       Assessment:         ICD-10-CM ICD-9-CM   1. Acute bacterial sinusitis  J01.90 461.9    B96.89    2. Eustachian catarrh, unspecified laterality  H68.009 381.50   3. Anxiety  F41.9 300.00           Plan:       Earlene was seen today for cough.    Diagnoses and all orders for this visit:    Acute bacterial sinusitis/Eustachian catarrh, unspecified laterality  -     fluconazole (DIFLUCAN) 150 MG Tab; 1 tablet orally at first sign of yeast infection and 1 tablet orally 3 days later  -     doxycycline (VIBRAMYCIN) 100 MG Cap; Take 1 capsule (100 mg total) by mouth 2 (two) times daily. for 10 days  -     azelastine (ASTELIN) 137 mcg (0.1 %) nasal spray; 2 sprays (274 mcg total) by Nasal route 2 (two) times daily.  -     fluticasone propionate (FLONASE) 50 mcg/actuation nasal spray; 2 sprays (100 mcg total) by Each Nostril route once daily.  -     methylPREDNISolone (MEDROL DOSEPACK) 4 mg tablet; use as  directed  Advised patient to use nasal sprays as prescribed  Patient instructed on how to use the nasal sprays via video (https://www.youtube.com/watch?v=btXga2QIGZX)  Given severity of symptoms will start on antibiotic regimen.  Advised using a probiotic or eating a yogurt every day to minimize GI side effects of antibiotics.     Anxiety  On Zoloft

## 2023-03-01 NOTE — PATIENT INSTRUCTIONS
In Morning  Rinse passages with nasal saline  Flonase 2 sprays per nostril wait 5 minutes then   Astelin 2 sprays per nostril    In the evening  Rinse passages with nasal saline  Astelin 2 sprays per nostril    Do above for a month then you can stop Astelin but continue Flonase to prevent symptoms from returning. If symptoms return, restart Astelin    Nasal spray video (https://www.iogyn.com/watch?v=axIfd5NYKBV)      Explained that after respiratory infection is better, may continue to have a dry cough for 2-4 weeks, and sometimes longer.  Cough lasting more than 3 weeks may need further evaluation.  To help ease a cough at home, can try:  -Drinking water, warm broths, and teas to stay hydrated  -Drinking lemon and honey (not suitable before the age of 12 months)  -Avoiding irritants such as pollen, smoke, and dust  -Using a humidifier  -Breathing in steam from a hot shower or bath to open the airways

## 2023-03-23 DIAGNOSIS — B96.89 ACUTE BACTERIAL SINUSITIS: ICD-10-CM

## 2023-03-23 DIAGNOSIS — H68.009 EUSTACHIAN CATARRH, UNSPECIFIED LATERALITY: ICD-10-CM

## 2023-03-23 DIAGNOSIS — J01.90 ACUTE BACTERIAL SINUSITIS: ICD-10-CM

## 2023-03-23 NOTE — TELEPHONE ENCOUNTER
Refill Routing Note   Medication(s) are not appropriate for processing by [facility] for the following reason(s):      Non-participating provider    [facility] action(s):  Route            Appointments  past 12m or future 3m with PCP    Date Provider   Last Visit   3/1/2023 Junior Monroy Jr., MD   Next Visit   Visit date not found Junior Monroy Jr., MD   ED visits in past 90 days: 0        Note composed:10:35 AM 03/23/2023

## 2023-03-24 RX ORDER — AZELASTINE 1 MG/ML
SPRAY, METERED NASAL
Qty: 30 ML | Refills: 0 | Status: SHIPPED | OUTPATIENT
Start: 2023-03-24

## 2023-03-24 NOTE — TELEPHONE ENCOUNTER
Please let patient know I will send one time only refill of requested medication. She will need to establish PCP. If she wants to establish with me can  make an appt for a wellness check in Sept when she is due (1 yr from last)

## 2023-05-18 ENCOUNTER — LAB VISIT (OUTPATIENT)
Dept: LAB | Facility: HOSPITAL | Age: 33
End: 2023-05-18
Attending: OBSTETRICS & GYNECOLOGY
Payer: COMMERCIAL

## 2023-05-18 ENCOUNTER — OFFICE VISIT (OUTPATIENT)
Dept: OBSTETRICS AND GYNECOLOGY | Facility: CLINIC | Age: 33
End: 2023-05-18
Payer: COMMERCIAL

## 2023-05-18 VITALS — HEIGHT: 63 IN | BODY MASS INDEX: 24.1 KG/M2 | WEIGHT: 136 LBS

## 2023-05-18 DIAGNOSIS — Z11.51 ENCOUNTER FOR SCREENING FOR HUMAN PAPILLOMAVIRUS (HPV): Primary | ICD-10-CM

## 2023-05-18 DIAGNOSIS — Z12.4 ENCOUNTER FOR PAPANICOLAOU SMEAR FOR CERVICAL CANCER SCREENING: ICD-10-CM

## 2023-05-18 DIAGNOSIS — N64.3 GALACTORRHEA: ICD-10-CM

## 2023-05-18 DIAGNOSIS — F41.1 GAD (GENERALIZED ANXIETY DISORDER): ICD-10-CM

## 2023-05-18 LAB — PROLACTIN SERPL IA-MCNC: 8.7 NG/ML (ref 5.2–26.5)

## 2023-05-18 PROCEDURE — 88175 CYTOPATH C/V AUTO FLUID REDO: CPT | Performed by: OBSTETRICS & GYNECOLOGY

## 2023-05-18 PROCEDURE — 84146 ASSAY OF PROLACTIN: CPT | Performed by: OBSTETRICS & GYNECOLOGY

## 2023-05-18 PROCEDURE — 99395 PREV VISIT EST AGE 18-39: CPT | Mod: S$GLB,,, | Performed by: OBSTETRICS & GYNECOLOGY

## 2023-05-18 PROCEDURE — 99395 PR PREVENTIVE VISIT,EST,18-39: ICD-10-PCS | Mod: S$GLB,,, | Performed by: OBSTETRICS & GYNECOLOGY

## 2023-05-18 PROCEDURE — 99999 PR PBB SHADOW E&M-EST. PATIENT-LVL III: ICD-10-PCS | Mod: PBBFAC,,, | Performed by: OBSTETRICS & GYNECOLOGY

## 2023-05-18 PROCEDURE — 3008F BODY MASS INDEX DOCD: CPT | Mod: CPTII,S$GLB,, | Performed by: OBSTETRICS & GYNECOLOGY

## 2023-05-18 PROCEDURE — 3008F PR BODY MASS INDEX (BMI) DOCUMENTED: ICD-10-PCS | Mod: CPTII,S$GLB,, | Performed by: OBSTETRICS & GYNECOLOGY

## 2023-05-18 PROCEDURE — 1159F MED LIST DOCD IN RCRD: CPT | Mod: CPTII,S$GLB,, | Performed by: OBSTETRICS & GYNECOLOGY

## 2023-05-18 PROCEDURE — 36415 COLL VENOUS BLD VENIPUNCTURE: CPT | Performed by: OBSTETRICS & GYNECOLOGY

## 2023-05-18 PROCEDURE — 87624 HPV HI-RISK TYP POOLED RSLT: CPT | Performed by: OBSTETRICS & GYNECOLOGY

## 2023-05-18 PROCEDURE — 1159F PR MEDICATION LIST DOCUMENTED IN MEDICAL RECORD: ICD-10-PCS | Mod: CPTII,S$GLB,, | Performed by: OBSTETRICS & GYNECOLOGY

## 2023-05-18 PROCEDURE — 99999 PR PBB SHADOW E&M-EST. PATIENT-LVL III: CPT | Mod: PBBFAC,,, | Performed by: OBSTETRICS & GYNECOLOGY

## 2023-05-18 RX ORDER — SERTRALINE HYDROCHLORIDE 50 MG/1
50 TABLET, FILM COATED ORAL DAILY
Qty: 90 EACH | Refills: 3 | Status: SHIPPED | OUTPATIENT
Start: 2023-05-18 | End: 2023-05-31 | Stop reason: SDUPTHER

## 2023-05-18 NOTE — PROGRESS NOTES
Subjective:       Patient ID: Earlene Nassar is a 32 y.o. female.    Chief Complaint:  Annual Exam (Last pap 2019 normal)        History of Present Illness  Earlene Nassar is a 32 y.o. female  who presents for annual. Overall doing well. All questions answered. Reports leaking of white fluid from bilateral nipples. Last baby deliver 1 year ago. Did not breastfeed. Will check Prolactin. Periods are normal.      I explained new pap and HPV guidelines. Will do pap and HPV test today. Will repeat pap and HPV every 3 years. Answered all questions. Patient agrees.       Patient's last menstrual period was 05/10/2023 (approximate).   Date of Last Pap: 2023    Review of Systems  Review of Systems   Constitutional:  Negative for chills and fever.      Objective:   Physical Exam:   Constitutional: She is oriented to person, place, and time. Vital signs are normal. She appears well-developed and well-nourished. No distress.        Pulmonary/Chest: She exhibits no mass. Right breast exhibits no mass, no nipple discharge, no skin change, no tenderness, no bleeding and no swelling. Left breast exhibits no mass, no nipple discharge, no skin change, no tenderness, no bleeding and no swelling. Breasts are symmetrical.        Abdominal: Soft. Bowel sounds are normal. She exhibits no distension and no mass. There is no abdominal tenderness. There is no rebound.     Genitourinary:    Vagina and uterus normal.   There is no rash, tenderness, lesion or injury on the right labia. There is no rash, tenderness, lesion or injury on the left labia. Cervix is normal. Right adnexum displays no mass, no tenderness and no fullness. Left adnexum displays no mass, no tenderness and no fullness. No erythema,  no vaginal discharge, tenderness, rectocele, cystocele or unspecified prolapse of vaginal walls in the vagina. Cervix exhibits no motion tenderness, no discharge and no friability. Uterus is not deviated, not enlarged, not  fixed, not tender and not hosting fibroids.           Musculoskeletal: Normal range of motion and moves all extremeties.      Lymphadenopathy:        Right: No supraclavicular adenopathy present.        Left: No supraclavicular adenopathy present.    Neurological: She is alert and oriented to person, place, and time.    Skin: Skin is warm and dry.    Psychiatric: She has a normal mood and affect. Her behavior is normal. Judgment normal.      Assessment/ Plan:     1. Encounter for screening for human papillomavirus (HPV)  HPV High Risk Genotypes, PCR      2. Encounter for Papanicolaou smear for cervical cancer screening  Liquid-Based Pap Smear, Screening      3. Galactorrhea  PROLACTIN      4. RIKA (generalized anxiety disorder)  sertraline (ZOLOFT) 50 MG tablet          No follow-ups on file.    As of April 1, 2021, the Cures Act has been passed nationally. This new law requires that all doctors progress notes, lab results, pathology reports and radiology reports be released IMMEDIATELY to the patient in the patient portal. That means that the results are released to you at the EXACT same time they are released to me. Therefore, with all of the patients that I have I am not able to reply to each patient exactly when the results come in. So there will be a delay from when you see the results to when I see them and have time to come up with a response to send you. Also I only see these results when I am on the computer at work. So if the results come in over the weekend or after 5 pm of a work day, I will not see them until the next business day. As you can tell, this is a challenge as a physician to give every patient the quick response they hope for and deserve. So please be patient! Thanks for understanding, Dr. Mejia

## 2023-05-24 LAB
FINAL PATHOLOGIC DIAGNOSIS: NORMAL
Lab: NORMAL

## 2023-05-26 LAB
HPV HR 12 DNA SPEC QL NAA+PROBE: NEGATIVE
HPV16 AG SPEC QL: NEGATIVE
HPV18 DNA SPEC QL NAA+PROBE: NEGATIVE

## 2023-05-31 ENCOUNTER — PATIENT MESSAGE (OUTPATIENT)
Dept: OBSTETRICS AND GYNECOLOGY | Facility: CLINIC | Age: 33
End: 2023-05-31
Payer: COMMERCIAL

## 2023-05-31 DIAGNOSIS — F41.1 GAD (GENERALIZED ANXIETY DISORDER): ICD-10-CM

## 2023-06-01 RX ORDER — SERTRALINE HYDROCHLORIDE 50 MG/1
50 TABLET, FILM COATED ORAL DAILY
Qty: 90 TABLET | Refills: 3 | Status: SHIPPED | OUTPATIENT
Start: 2023-06-01

## 2023-07-19 ENCOUNTER — OFFICE VISIT (OUTPATIENT)
Dept: FAMILY MEDICINE | Facility: CLINIC | Age: 33
End: 2023-07-19
Payer: COMMERCIAL

## 2023-07-19 VITALS
SYSTOLIC BLOOD PRESSURE: 111 MMHG | BODY MASS INDEX: 24.63 KG/M2 | OXYGEN SATURATION: 97 % | DIASTOLIC BLOOD PRESSURE: 68 MMHG | TEMPERATURE: 99 F | WEIGHT: 139 LBS | HEART RATE: 72 BPM | HEIGHT: 63 IN

## 2023-07-19 DIAGNOSIS — H60.501 ACUTE OTITIS EXTERNA OF RIGHT EAR, UNSPECIFIED TYPE: Primary | ICD-10-CM

## 2023-07-19 DIAGNOSIS — H61.21 IMPACTED CERUMEN OF RIGHT EAR: ICD-10-CM

## 2023-07-19 PROCEDURE — 3078F PR MOST RECENT DIASTOLIC BLOOD PRESSURE < 80 MM HG: ICD-10-PCS | Mod: CPTII,S$GLB,,

## 2023-07-19 PROCEDURE — 1159F MED LIST DOCD IN RCRD: CPT | Mod: CPTII,S$GLB,,

## 2023-07-19 PROCEDURE — 69210 REMOVE IMPACTED EAR WAX UNI: CPT | Mod: S$GLB,,,

## 2023-07-19 PROCEDURE — 3008F PR BODY MASS INDEX (BMI) DOCUMENTED: ICD-10-PCS | Mod: CPTII,S$GLB,,

## 2023-07-19 PROCEDURE — 3074F PR MOST RECENT SYSTOLIC BLOOD PRESSURE < 130 MM HG: ICD-10-PCS | Mod: CPTII,S$GLB,,

## 2023-07-19 PROCEDURE — 99214 PR OFFICE/OUTPT VISIT, EST, LEVL IV, 30-39 MIN: ICD-10-PCS | Mod: 25,S$GLB,,

## 2023-07-19 PROCEDURE — 3074F SYST BP LT 130 MM HG: CPT | Mod: CPTII,S$GLB,,

## 2023-07-19 PROCEDURE — 69210 EAR CERUMEN REMOVAL: ICD-10-PCS | Mod: S$GLB,,,

## 2023-07-19 PROCEDURE — 1159F PR MEDICATION LIST DOCUMENTED IN MEDICAL RECORD: ICD-10-PCS | Mod: CPTII,S$GLB,,

## 2023-07-19 PROCEDURE — 99999 PR PBB SHADOW E&M-EST. PATIENT-LVL IV: ICD-10-PCS | Mod: PBBFAC,,,

## 2023-07-19 PROCEDURE — 99214 OFFICE O/P EST MOD 30 MIN: CPT | Mod: 25,S$GLB,,

## 2023-07-19 PROCEDURE — 99999 PR PBB SHADOW E&M-EST. PATIENT-LVL IV: CPT | Mod: PBBFAC,,,

## 2023-07-19 PROCEDURE — 3008F BODY MASS INDEX DOCD: CPT | Mod: CPTII,S$GLB,,

## 2023-07-19 PROCEDURE — 3078F DIAST BP <80 MM HG: CPT | Mod: CPTII,S$GLB,,

## 2023-07-19 RX ORDER — CIPROFLOXACIN AND DEXAMETHASONE 3; 1 MG/ML; MG/ML
4 SUSPENSION/ DROPS AURICULAR (OTIC) 2 TIMES DAILY
Qty: 7.5 ML | Refills: 0 | Status: SHIPPED | OUTPATIENT
Start: 2023-07-19 | End: 2023-07-26

## 2023-07-19 NOTE — PROCEDURES
Ear Cerumen Removal    Date/Time: 7/19/2023 10:30 AM  Performed by: Marilou Maguire NP  Authorized by: Marilou Maguire NP     Location details:  Right ear  Procedure type: curette    Cerumen  Removal Results:  Cerumen completely removed  Patient tolerance:  Patient tolerated the procedure well with no immediate complications

## 2023-10-17 ENCOUNTER — OFFICE VISIT (OUTPATIENT)
Dept: FAMILY MEDICINE | Facility: CLINIC | Age: 33
End: 2023-10-17
Payer: COMMERCIAL

## 2023-10-17 VITALS
BODY MASS INDEX: 25.32 KG/M2 | HEART RATE: 80 BPM | HEIGHT: 63 IN | SYSTOLIC BLOOD PRESSURE: 112 MMHG | DIASTOLIC BLOOD PRESSURE: 72 MMHG | WEIGHT: 142.88 LBS | TEMPERATURE: 98 F | OXYGEN SATURATION: 97 %

## 2023-10-17 DIAGNOSIS — Z00.00 ROUTINE MEDICAL EXAM: Primary | ICD-10-CM

## 2023-10-17 DIAGNOSIS — F41.9 ANXIETY: Chronic | ICD-10-CM

## 2023-10-17 DIAGNOSIS — Z82.49 FAMILY HISTORY OF EARLY CAD: ICD-10-CM

## 2023-10-17 DIAGNOSIS — Z87.59 HISTORY OF PRE-ECLAMPSIA: ICD-10-CM

## 2023-10-17 PROCEDURE — 3074F SYST BP LT 130 MM HG: CPT | Mod: CPTII,S$GLB,, | Performed by: NURSE PRACTITIONER

## 2023-10-17 PROCEDURE — 99395 PR PREVENTIVE VISIT,EST,18-39: ICD-10-PCS | Mod: S$GLB,,, | Performed by: NURSE PRACTITIONER

## 2023-10-17 PROCEDURE — 3008F BODY MASS INDEX DOCD: CPT | Mod: CPTII,S$GLB,, | Performed by: NURSE PRACTITIONER

## 2023-10-17 PROCEDURE — 1160F RVW MEDS BY RX/DR IN RCRD: CPT | Mod: CPTII,S$GLB,, | Performed by: NURSE PRACTITIONER

## 2023-10-17 PROCEDURE — 1159F PR MEDICATION LIST DOCUMENTED IN MEDICAL RECORD: ICD-10-PCS | Mod: CPTII,S$GLB,, | Performed by: NURSE PRACTITIONER

## 2023-10-17 PROCEDURE — 3078F DIAST BP <80 MM HG: CPT | Mod: CPTII,S$GLB,, | Performed by: NURSE PRACTITIONER

## 2023-10-17 PROCEDURE — 99999 PR PBB SHADOW E&M-EST. PATIENT-LVL IV: CPT | Mod: PBBFAC,,, | Performed by: NURSE PRACTITIONER

## 2023-10-17 PROCEDURE — 1159F MED LIST DOCD IN RCRD: CPT | Mod: CPTII,S$GLB,, | Performed by: NURSE PRACTITIONER

## 2023-10-17 PROCEDURE — 3078F PR MOST RECENT DIASTOLIC BLOOD PRESSURE < 80 MM HG: ICD-10-PCS | Mod: CPTII,S$GLB,, | Performed by: NURSE PRACTITIONER

## 2023-10-17 PROCEDURE — 3074F PR MOST RECENT SYSTOLIC BLOOD PRESSURE < 130 MM HG: ICD-10-PCS | Mod: CPTII,S$GLB,, | Performed by: NURSE PRACTITIONER

## 2023-10-17 PROCEDURE — 99395 PREV VISIT EST AGE 18-39: CPT | Mod: S$GLB,,, | Performed by: NURSE PRACTITIONER

## 2023-10-17 PROCEDURE — 1160F PR REVIEW ALL MEDS BY PRESCRIBER/CLIN PHARMACIST DOCUMENTED: ICD-10-PCS | Mod: CPTII,S$GLB,, | Performed by: NURSE PRACTITIONER

## 2023-10-17 PROCEDURE — 99999 PR PBB SHADOW E&M-EST. PATIENT-LVL IV: ICD-10-PCS | Mod: PBBFAC,,, | Performed by: NURSE PRACTITIONER

## 2023-10-17 PROCEDURE — 3008F PR BODY MASS INDEX (BMI) DOCUMENTED: ICD-10-PCS | Mod: CPTII,S$GLB,, | Performed by: NURSE PRACTITIONER

## 2023-10-17 NOTE — MEDICAL/APP STUDENT
OFFICE VISIT      SUBJECTIVE:   Earlene Nassar is a 33 year old female who presents today for annual medical exam. She did not have labs prior to the visit. No new complaints today. She has been taking Zoloft for anxiety. No perceived side effects noted. She reports that her mood has been stable. We will address health maintenance today.     Past Medical History:   Diagnosis Date    Anxiety     Xanax prn    Asthma due to environmental allergies     Xyzal      Past Surgical History:   Procedure Laterality Date    ADENOIDECTOMY       SECTION N/A 2022    Procedure:  SECTION;  Surgeon: Sariah Mejia MD;  Location: [facility] L&D;  Service: OB/GYN;  Laterality: N/A;  Hx 4th degree tear    TONSILLECTOMY        Family History   Problem Relation Age of Onset    No Known Problems Mother     Heart disease Father     No Known Problems Maternal Grandmother     No Known Problems Maternal Grandfather     No Known Problems Paternal Grandmother     No Known Problems Paternal Grandfather     No Known Problems Sister     Breast cancer Neg Hx     Colon cancer Neg Hx     Ovarian cancer Neg Hx     Cervical cancer Neg Hx     Endometrial cancer Neg Hx     Vaginal cancer Neg Hx         Review of Systems   Constitutional:  Negative for fever, malaise/fatigue and weight loss.   HENT:  Negative for hearing loss and tinnitus.    Eyes:  Negative for blurred vision and double vision.   Respiratory:  Negative for cough and shortness of breath.    Cardiovascular:  Negative for chest pain, palpitations and leg swelling.   Gastrointestinal:  Negative for abdominal pain and blood in stool.   Genitourinary:  Negative for dysuria.   Musculoskeletal:  Negative for joint pain and myalgias.   Skin:  Negative for rash.   Neurological:  Negative for dizziness, weakness and headaches.   Endo/Heme/Allergies:  Negative for polydipsia. Does not bruise/bleed easily.   Psychiatric/Behavioral:  Negative for depression. The patient is not  "nervous/anxious.            OBJECTIVE:     Current Outpatient Medications   Medication Instructions    azelastine (ASTELIN) 137 mcg (0.1 %) nasal spray SPRAY 2 SPRAYS BY NASAL ROUTE 2 TIMES DAILY.    fluticasone propionate (FLONASE) 50 mcg/actuation nasal spray SPRAY 2 SPRAYS BY EACH NOSTRIL ROUTE ONCE DAILY.    sertraline (ZOLOFT) 50 mg, Oral, Daily        /72 (BP Location: Right arm, Patient Position: Sitting, BP Method: Large (Manual))   Pulse 80   Temp 98.4 °F (36.9 °C) (Oral)   Ht 5' 3" (1.6 m)   Wt 64.8 kg (142 lb 13.7 oz)   SpO2 97%   BMI 25.31 kg/m²      Physical Exam  Constitutional:       Appearance: Normal appearance.   HENT:      Head: Normocephalic and atraumatic.      Right Ear: Tympanic membrane, ear canal and external ear normal.      Left Ear: Tympanic membrane, ear canal and external ear normal.      Nose: Nose normal.      Mouth/Throat:      Mouth: Mucous membranes are moist.      Pharynx: No posterior oropharyngeal erythema.   Eyes:      Extraocular Movements: Extraocular movements intact.      Pupils: Pupils are equal, round, and reactive to light.   Neck:      Vascular: No carotid bruit.   Cardiovascular:      Rate and Rhythm: Normal rate and regular rhythm.      Pulses: Normal pulses.      Heart sounds: Normal heart sounds.   Pulmonary:      Effort: Pulmonary effort is normal. No respiratory distress.      Breath sounds: Normal breath sounds.   Abdominal:      General: Bowel sounds are normal. There is no distension.      Palpations: Abdomen is soft. There is no mass.      Tenderness: There is no abdominal tenderness.   Musculoskeletal:         General: Normal range of motion.      Cervical back: Normal range of motion and neck supple.      Right lower leg: No edema.      Left lower leg: No edema.   Lymphadenopathy:      Cervical: No cervical adenopathy.   Skin:     General: Skin is warm and dry.      Findings: No bruising, lesion or rash.   Neurological:      General: No focal " deficit present.      Mental Status: She is alert and oriented to person, place, and time.   Psychiatric:         Mood and Affect: Mood normal.         Behavior: Behavior normal.            ASSESSMENT & PLAN:  Earlene was seen today for annual exam.    Diagnoses and all orders for this visit:    Routine medical exam  - Discussed healthy diet, regular exercise, necessary labs, age appropriate screenings, and routine vaccinations.   - Declined flu shot  - Discussed updated COVID-19 booster  - Routinely followed by OB/GYN, Pap smear and HPV testing UTD    Labs Ordered:  - CBC Auto Differential; Future  - Comprehensive Metabolic Panel; Future  - Lipid Panel; Future    Anxiety  - The current medical regimen is effective. Continue current plan and medications. Zoloft 50 mg daily.       Family history of early CAD  History of pre-eclampsia  - Patient reports family history of early CAD, father with MI in 40s. She would like to be evaluated by cardiology. Referral placed.  - Elevated LDL and Total Cholesterol on most recent lipid panel, repeat lipid panel ordered.   - Discussed lifestyle changes to diet and exercise to aid in the medical management of this problem.    - History of pre-eclampsia with last pregnancy. No longer on antihypertensives. BP normal in office today.              Follow-up to be determined based on pending lab results.         Assessment and plan of care discussed with Dee Estrada NP. Attestation to follow.   Monika iSlva NP Student

## 2023-10-17 NOTE — PROGRESS NOTES
History of Present Illness   Earlene Nassar is a 33 y.o. woman with medical history as listed below who presents today for routine medical exam. She did not have labs prior to our visit today. She is taking medications as prescribed without perceived SE. Complaints today: none. We will address HM today.    Past Medical History:   Diagnosis Date    Anxiety     Xanax prn    Asthma due to environmental allergies     Xyzal       Past Surgical History:   Procedure Laterality Date    ADENOIDECTOMY       SECTION N/A 2022    Procedure:  SECTION;  Surgeon: Sariah Mejia MD;  Location: [facility] L&D;  Service: OB/GYN;  Laterality: N/A;  Hx 4th degree tear    TONSILLECTOMY         Social History     Socioeconomic History    Marital status:    Tobacco Use    Smoking status: Never    Smokeless tobacco: Never   Substance and Sexual Activity    Alcohol use: Yes     Comment: occasionally    Drug use: No    Sexual activity: Yes     Partners: Male     Birth control/protection: None     Social Determinants of Health     Financial Resource Strain: Low Risk  (10/16/2023)    Overall Financial Resource Strain (CARDIA)     Difficulty of Paying Living Expenses: Not hard at all   Food Insecurity: No Food Insecurity (10/16/2023)    Hunger Vital Sign     Worried About Running Out of Food in the Last Year: Never true     Ran Out of Food in the Last Year: Never true   Transportation Needs: No Transportation Needs (10/16/2023)    PRAPARE - Transportation     Lack of Transportation (Medical): No     Lack of Transportation (Non-Medical): No   Physical Activity: Insufficiently Active (10/16/2023)    Exercise Vital Sign     Days of Exercise per Week: 2 days     Minutes of Exercise per Session: 60 min   Stress: No Stress Concern Present (10/16/2023)    [employer] [employer] - Occupational Stress Questionnaire     Feeling of Stress : Only a little   Recent Concern: Stress - Stress Concern Present  (7/19/2023)    [place] [employer] - Occupational Stress Questionnaire     Feeling of Stress : To some extent   Social Connections: Unknown (10/16/2023)    Social Connection and Isolation Panel [NHANES]     Frequency of Communication with Friends and Family: More than three times a week     Frequency of Social Gatherings with Friends and Family: More than three times a week     Active Member of Clubs or Organizations: Yes     Attends Club or Organization Meetings: More than 4 times per year     Marital Status:    Housing Stability: Low Risk  (10/16/2023)    Housing Stability Vital Sign     Unable to Pay for Housing in the Last Year: No     Number of Places Lived in the Last Year: 1     Unstable Housing in the Last Year: No       Family History   Problem Relation Age of Onset    No Known Problems Mother     Heart disease Father     No Known Problems Maternal Grandmother     No Known Problems Maternal Grandfather     No Known Problems Paternal Grandmother     No Known Problems Paternal Grandfather     No Known Problems Sister     Breast cancer Neg Hx     Colon cancer Neg Hx     Ovarian cancer Neg Hx     Cervical cancer Neg Hx     Endometrial cancer Neg Hx     Vaginal cancer Neg Hx        Review of Systems  Review of Systems   Constitutional:  Negative for malaise/fatigue and weight loss.   HENT:  Negative for hearing loss and tinnitus.    Eyes:  Negative for blurred vision, double vision and discharge.   Respiratory:  Negative for shortness of breath and wheezing.    Cardiovascular:  Negative for chest pain, palpitations, orthopnea, claudication and leg swelling.   Gastrointestinal:  Negative for blood in stool, constipation, diarrhea, melena and vomiting.   Genitourinary:  Negative for dysuria, flank pain and hematuria.   Musculoskeletal:  Negative for back pain, joint pain and neck pain.   Skin:  Negative for itching and rash.   Neurological:  Negative for dizziness, loss of  "consciousness, weakness and headaches.   Endo/Heme/Allergies:  Negative for polydipsia.   Psychiatric/Behavioral:  Negative for depression. The patient is not nervous/anxious and does not have insomnia.      A complete review of systems was otherwise negative.    Physical Exam  /72 (BP Location: Right arm, Patient Position: Sitting, BP Method: Large (Manual))   Pulse 80   Temp 98.4 °F (36.9 °C) (Oral)   Ht 5' 3" (1.6 m)   Wt 64.8 kg (142 lb 13.7 oz)   SpO2 97%   BMI 25.31 kg/m²   General appearance: alert, appears stated age, cooperative, and no distress  Head: Normocephalic, without obvious abnormality, atraumatic  Eyes: negative findings: lids and lashes normal and conjunctivae and sclerae normal  Ears: normal TM's and external ear canals both ears  Nose: Nares normal. Septum midline. Mucosa normal. No drainage or sinus tenderness.  Throat: lips, mucosa, and tongue normal; teeth and gums normal  Neck: no carotid bruit, no JVD, supple, symmetrical, trachea midline, and thyroid not enlarged, symmetric, no tenderness/mass/nodules  Back: symmetric, no curvature. ROM normal. No CVA tenderness.  Lungs: clear to auscultation bilaterally  Heart: regular rate and rhythm, S1, S2 normal, no murmur, click, rub or gallop  Abdomen: soft, non-tender; bowel sounds normal; no masses,  no organomegaly  Extremities: extremities normal, atraumatic, no cyanosis or edema  Pulses: 2+ and symmetric  Skin: Skin color, texture, turgor normal. No rashes or lesions  Lymph nodes: Cervical, supraclavicular, and axillary nodes normal.  Neurologic: Grossly normal    Assessment/Plan  Routine medical exam  Age appropriate screening recommendations and HM were discussed and updated.  Discussed importance of heart healthy diet and exercise.  Labs ordered as below.  Follow-up TBD pending labs.  -     CBC Auto Differential; Future; Expected date: 10/17/2023  -     Comprehensive Metabolic Panel; Future; Expected date: 10/17/2023  -     " Lipid Panel; Future; Expected date: 10/17/2023    Anxiety  The current medical regimen is effective;  continue present plan and medications.  Stable on SSRI therapy.    Family history of early CAD  With elevated LDL, total cholesterol, and history of pre-eclampsia.  She is interested in cardiology evaluation, referral placed.  -     Ambulatory referral/consult to Cardiology; Future; Expected date: 10/24/2023    History of pre-eclampsia  As above.  -     Ambulatory referral/consult to Cardiology; Future; Expected date: 10/24/2023    Patient has verbalized understanding and is in agreement with plan of care.    Follow up in about 1 year (around 10/17/2024) for routine physical.      Answers submitted by the patient for this visit:  Review of Systems Questionnaire (Submitted on 10/16/2023)  activity change: No  unexpected weight change: No  rhinorrhea: No  trouble swallowing: No  visual disturbance: No  chest tightness: No  polyuria: No  difficulty urinating: No  menstrual problem: No  joint swelling: No  arthralgias: No  confusion: No  dysphoric mood: No

## 2024-01-26 ENCOUNTER — PATIENT MESSAGE (OUTPATIENT)
Dept: INTERNAL MEDICINE | Facility: CLINIC | Age: 34
End: 2024-01-26
Payer: COMMERCIAL

## 2024-04-11 ENCOUNTER — E-VISIT (OUTPATIENT)
Dept: INTERNAL MEDICINE | Facility: CLINIC | Age: 34
End: 2024-04-11
Payer: COMMERCIAL

## 2024-04-11 ENCOUNTER — TELEPHONE (OUTPATIENT)
Dept: INTERNAL MEDICINE | Facility: CLINIC | Age: 34
End: 2024-04-11
Payer: COMMERCIAL

## 2024-04-11 DIAGNOSIS — J06.9 VIRAL URI WITH COUGH: Primary | ICD-10-CM

## 2024-04-11 PROCEDURE — 99421 OL DIG E/M SVC 5-10 MIN: CPT | Mod: ,,, | Performed by: NURSE PRACTITIONER

## 2024-04-12 DIAGNOSIS — J06.9 VIRAL URI WITH COUGH: ICD-10-CM

## 2024-04-12 RX ORDER — LEVOCETIRIZINE DIHYDROCHLORIDE 5 MG/1
5 TABLET, FILM COATED ORAL NIGHTLY
Qty: 30 TABLET | Refills: 0 | Status: SHIPPED | OUTPATIENT
Start: 2024-04-12 | End: 2025-04-12

## 2024-04-12 RX ORDER — FLUTICASONE PROPIONATE 50 MCG
1 SPRAY, SUSPENSION (ML) NASAL DAILY
Qty: 16 G | Refills: 0 | Status: SHIPPED | OUTPATIENT
Start: 2024-04-12 | End: 2024-05-06

## 2024-04-12 RX ORDER — PROMETHAZINE HYDROCHLORIDE AND DEXTROMETHORPHAN HYDROBROMIDE 6.25; 15 MG/5ML; MG/5ML
5 SYRUP ORAL EVERY 4 HOURS PRN
Qty: 180 ML | Refills: 0 | Status: SHIPPED | OUTPATIENT
Start: 2024-04-12 | End: 2024-04-22

## 2024-04-12 NOTE — PROGRESS NOTES
Patient ID: Earlene Nassar is a 33 y.o. female.    Chief Complaint: URI (Entered automatically based on patient selection in Patient Portal.)    The patient initiated a request through [employer] on 4/11/2024 for evaluation and management with a chief complaint of URI (Entered automatically based on patient selection in Patient Portal.)     I evaluated the questionnaire submission on 04/11/2024.    [facility] Peq Evisit Upper Respitatory/Cough Questionnaire    4/11/2024  3:26 PM CDT - Filed by Patient   Do you agree to participate in an E-Visit? Yes   If you have any of the following symptoms, please present to your local ER or call 911:  I acknowledge   Choose the state of your primary residence [place]   What is the main issue you would like addressed today? Cough for 2 weeks   Are you able to take your vital signs? No   Are you pregnant, could you be pregnant, or are you breast feeding? None of the above   What symptoms do you currently have?  Cough;  Diarrhea;  Nasal Congestion;  Nausea;  Sore throat   Describe your cough: Dry;  Bothersome (interferes with daily activities)   Have you had any of the following? None of the above   Have you ever smoked? I have never smoked   Have you had a fever? Yes   What has been the range of your fever? Less than 100.4   When did your symptoms first appear? 3/29/2024   In the last two weeks, have you been in close contact with someone who has COVID-19 or the Flu? No   In the last two weeks, have you worked or volunteered in a healthcare facility or as a ? Healthcare facilities include a hospital, medical or dental clinic, long-term care facility, or nursing home No   Do you live in a long-term care facility, nursing home, group home, or homeless shelter? No   List what you have done or taken to help your symptoms. Mucinex, dayquil   How severe are your symptoms? Moderate   Have your symptoms improved since they first appeared? Better   Have you taken an at home  Covid test? Yes   What were the results? Negative   Have you taken a Flu test? No   Have you been fully vaccinated for COVID? (2 Pfizer, 2 Moderna or 1 [employer] vaccine injections) No   Have you received your first dose of the Pfizer or Moderna COVID vaccine? No   Have you recieved a Flu shot? No   Do you have transportation to get tested for COVID if it is indicated and ordered for you at an [facility] location? Yes   Provide any additional information you feel is important.    Please attach any relevant images or files          Encounter Diagnosis   Name Primary?    Viral URI with cough Yes        No orders of the defined types were placed in this encounter.     Medications Ordered This Encounter   Medications    fluticasone propionate (FLONASE) 50 mcg/actuation nasal spray     Si spray (50 mcg total) by Each Nostril route once daily.     Dispense:  16 g     Refill:  0    levocetirizine (XYZAL) 5 MG tablet     Sig: Take 1 tablet (5 mg total) by mouth every evening.     Dispense:  30 tablet     Refill:  0    promethazine-dextromethorphan (PROMETHAZINE-DM) 6.25-15 mg/5 mL Syrp     Sig: Take 5 mLs by mouth every 4 (four) hours as needed (cough).     Dispense:  180 mL     Refill:  0        No follow-ups on file.      E-Visit Time Tracking:    Day 1 Time (in minutes): 5  Day 2 Time (in minutes): 5    Total Time (in minutes): 10

## 2024-04-15 RX ORDER — LEVOCETIRIZINE DIHYDROCHLORIDE 5 MG/1
5 TABLET, FILM COATED ORAL NIGHTLY
Qty: 90 TABLET | Refills: 1 | OUTPATIENT
Start: 2024-04-15

## 2024-05-04 DIAGNOSIS — J06.9 VIRAL URI WITH COUGH: ICD-10-CM

## 2024-05-06 RX ORDER — FLUTICASONE PROPIONATE 50 MCG
SPRAY, SUSPENSION (ML) NASAL
Qty: 48 ML | Refills: 1 | Status: SHIPPED | OUTPATIENT
Start: 2024-05-06

## 2024-05-06 NOTE — TELEPHONE ENCOUNTER
Refill Routing Note   Medication(s) are not appropriate for processing by [facility] for the following reason(s):      - NO PCP LISTED IN EPIC; ROUTING TO DEE ORTIZ AS LAST PRESCRIBING PROVIDER    [facility] action(s):  Route          Medication reconciliation completed: No     Appointments  past 12m or future 3m with PCP    Date Provider   Last Visit   4/11/2024 Dee Ortiz NP   Next Visit   Visit date not found Dee Ortiz NP   ED visits in past 90 days: 0        Note composed:10:41 AM 05/06/2024

## 2024-05-09 DIAGNOSIS — H68.009 EUSTACHIAN CATARRH, UNSPECIFIED LATERALITY: ICD-10-CM

## 2024-05-09 DIAGNOSIS — B96.89 ACUTE BACTERIAL SINUSITIS: ICD-10-CM

## 2024-05-09 DIAGNOSIS — J01.90 ACUTE BACTERIAL SINUSITIS: ICD-10-CM

## 2024-05-13 RX ORDER — AZELASTINE 1 MG/ML
SPRAY, METERED NASAL
Qty: 30 ML | Refills: 0 | OUTPATIENT
Start: 2024-05-13

## 2024-07-07 DIAGNOSIS — F41.1 GAD (GENERALIZED ANXIETY DISORDER): ICD-10-CM

## 2024-07-08 RX ORDER — SERTRALINE HYDROCHLORIDE 50 MG/1
50 TABLET, FILM COATED ORAL
Qty: 90 TABLET | Refills: 0 | Status: SHIPPED | OUTPATIENT
Start: 2024-07-08

## 2024-07-08 NOTE — TELEPHONE ENCOUNTER
Refill Decision Note   Earlene Nassar  is requesting a refill authorization.  Brief Assessment and Rationale for Refill:  Approve     Medication Therapy Plan:         Comments:     Note composed:3:13 AM 07/08/2024

## 2024-11-12 NOTE — PROGRESS NOTES
"CC: follow up 4th degree     Earlene Nassar is a 26 y.o. female  patient is 3 weeks post  complicated by 4th degree laceration that was repaired.  Patient taking Miralax to avoid constipation but has recently backed off because her stool was becoming watery.  Pain is minimal.  She is having some fecal incontinence if she gets the urge and can't make it to the bathroom in time.  The stool is small in amount and is soft and mushy when has the accidents.  No spontaneous leakage of fecal matter.  It is not every time she has the urge.  She has been off the miralax for the last 4 days.     History reviewed. No pertinent past medical history.    Past Surgical History:   Procedure Laterality Date    ADENOIDECTOMY      TONSILLECTOMY         OB History    Para Term  AB SAB TAB Ectopic Multiple Living   1 1 1      0 1      # Outcome Date GA Lbr Andrew/2nd Weight Sex Delivery Anes PTL Lv   1 Term 17 39w0d  3.402 kg (7 lb 8 oz) M Vag-Spont EPI N Y      Obstetric Comments   Menarche ~14       Family History   Problem Relation Age of Onset    No Known Problems Mother     Heart disease Father     No Known Problems Maternal Grandmother     No Known Problems Maternal Grandfather     No Known Problems Paternal Grandmother     No Known Problems Paternal Grandfather     Breast cancer Neg Hx     Colon cancer Neg Hx     Ovarian cancer Neg Hx        Social History   Substance Use Topics    Smoking status: Never Smoker    Smokeless tobacco: Never Used    Alcohol use No       /82  Ht 5' 3" (1.6 m)  Wt 58.4 kg (128 lb 13.7 oz)  LMP 2016 (Exact Date)  Breastfeeding? Yes  BMI 22.83 kg/m2    ROS:  GENERAL: Denies weight gain or weight loss. Feeling well overall.   SKIN: Denies rash or lesions.   HEAD: Denies head injury or headache.   NODES: Denies enlarged lymph nodes.   CHEST: Denies chest pain or shortness of breath.   CARDIOVASCULAR: Denies palpitations or left sided chest pain. " Please schedule patient for port flush after appointment with Dr. Myers on 11/27, around 230pm. You do not need to call the [facility]      ABDOMEN: No abdominal pain, constipation, diarrhea, nausea, vomiting or rectal bleeding.   URINARY: No frequency, dysuria, hematuria, or burning on urination.  REPRODUCTIVE: See HPI.   BREASTS: denies pain, lumps, or nipple discharge.   HEMATOLOGIC: No easy bruisability or excessive bleeding.  MUSCULOSKELETAL: Denies joint pain or swelling.   NEUROLOGIC: Denies syncope or weakness.   PSYCHIATRIC: Denies depression, anxiety or mood swings.    Physical Exam:    APPEARANCE: Well nourished, well developed, in no acute distress.  AFFECT: WNL, alert and oriented x 3  SKIN: No acne or hirsutism  NECK: Neck symmetric without masses or thyromegaly  NODES: No inguinal, cervical, axillary, or femoral lymph node enlargement  CHEST: Good respiratory effect  ABDOMEN: Soft.  No tenderness or masses.  No hepatosplenomegaly.  No hernias.  PELVIC: Normal external genitalia without lesions.  Normal hair distribution.  Adequate perineal body, normal urethral meatus.  Vagina moist and well rugated without lesions or discharge.  Cervix pink, without lesions, discharge or tenderness.  No significant cystocele or rectocele.  Bimanual exam shows uterus to be normal size, regular, mobile and nontender.  Adnexa without masses or tenderness.    EXTREMITIES: No edema.    ASSESSMENT AND PLAN    Earlene was seen today for postpartum care.    Diagnoses and all orders for this visit:    Fourth-degree perineal laceration, postpartum  -     Ambulatory consult to Urogynecology    Incontinence of feces, unspecified fecal incontinence type  -     Ambulatory consult to Urogynecology    On pelvic exam, repair is healing well. Rectovaginal exam patient has good tone but could be improved.  Recommend consult with urogyn and pelvic floor PT to strengthen.      Discussed risk of recurrence for future deliveries and will depend on how her recovery goes with future route of delivery.      Return in about 3 weeks (around 4/28/2017).

## 2025-02-21 ENCOUNTER — OFFICE VISIT (OUTPATIENT)
Dept: OBSTETRICS AND GYNECOLOGY | Facility: CLINIC | Age: 35
End: 2025-02-21
Payer: COMMERCIAL

## 2025-02-21 VITALS
SYSTOLIC BLOOD PRESSURE: 132 MMHG | WEIGHT: 151.25 LBS | BODY MASS INDEX: 26.79 KG/M2 | DIASTOLIC BLOOD PRESSURE: 78 MMHG

## 2025-02-21 DIAGNOSIS — Z01.419 ENCOUNTER FOR GYNECOLOGICAL EXAMINATION (GENERAL) (ROUTINE) WITHOUT ABNORMAL FINDINGS: Primary | ICD-10-CM

## 2025-02-21 NOTE — PROGRESS NOTES
Subjective:       Patient ID: Earlene Nassar is a 34 y.o. female.    Chief Complaint:  Well Woman (Last pap and hpv: 2023 Normal /)        History of Present Illness  Earlene Nassar is a 34 y.o. female  who presents for annual. Overall doing well. No gyn complaints. Periods normal    Patient's last menstrual period was 2025.   Date of Last Pap: 2023    Review of Systems  Review of Systems   Constitutional:  Negative for chills and fever.        Objective:   Physical Exam:   Constitutional: She is oriented to person, place, and time. Vital signs are normal. She appears well-developed and well-nourished. No distress.        Pulmonary/Chest: She exhibits no mass. Right breast exhibits no mass, no nipple discharge, no skin change, no tenderness, no bleeding and no swelling. Left breast exhibits no mass, no nipple discharge, no skin change, no tenderness, no bleeding and no swelling. Breasts are symmetrical.        Abdominal: Soft. Bowel sounds are normal. She exhibits no distension and no mass. There is no abdominal tenderness. There is no rebound.     Genitourinary:    Vagina and uterus normal.   There is no rash, tenderness, lesion or injury on the right labia. There is no rash, tenderness, lesion or injury on the left labia. Cervix is normal. Right adnexum displays no mass, no tenderness and no fullness. Left adnexum displays no mass, no tenderness and no fullness. No erythema, vaginal discharge, tenderness, rectocele, cystocele or prolapse of vaginal walls in the vagina. Cervix exhibits no motion tenderness, no discharge and no friability. Uterus is not deviated, not enlarged, not fixed, not tender and not hosting fibroids.           Musculoskeletal: Normal range of motion and moves all extremeties.      Lymphadenopathy:        Right: No supraclavicular adenopathy present.        Left: No supraclavicular adenopathy present.    Neurological: She is alert and oriented to person,  place, and time.    Skin: Skin is warm and dry.    Psychiatric: She has a normal mood and affect. Her behavior is normal. Judgment normal.        Assessment/ Plan:     1. Encounter for gynecological examination (general) (routine) without abnormal findings            No follow-ups on file.    As of April 1, 2021, the Cures Act has been passed nationally. This new law requires that all doctors progress notes, lab results, pathology reports and radiology reports be released IMMEDIATELY to the patient in the patient portal. That means that the results are released to you at the EXACT same time they are released to me. Therefore, with all of the patients that I have I am not able to reply to each patient exactly when the results come in. So there will be a delay from when you see the results to when I see them and have time to come up with a response to send you. Also I only see these results when I am on the computer at work. So if the results come in over the weekend or after 5 pm of a work day, I will not see them until the next business day. As you can tell, this is a challenge as a physician to give every patient the quick response they hope for and deserve. So please be patient! Thanks for understanding, Dr. Mejia

## 2025-02-25 NOTE — TELEPHONE ENCOUNTER
Asya lilly, has question about the zpack that we called in.  
Left a detailed VM that the rx has been sent to the pharmacy instructions given.   
Pt states she picked up the zpak but never got any cream that was mentioned.      Estrace vaginal cream pended  
Remind her it's pea size amount externally nightly rx sent  
03-Mar-2025

## 2025-03-26 ENCOUNTER — E-VISIT (OUTPATIENT)
Dept: FAMILY MEDICINE | Facility: CLINIC | Age: 35
End: 2025-03-26
Payer: COMMERCIAL

## 2025-03-26 DIAGNOSIS — M54.2 NECK PAIN: Primary | ICD-10-CM

## 2025-03-26 RX ORDER — NAPROXEN 500 MG/1
500 TABLET ORAL 2 TIMES DAILY PRN
Qty: 60 TABLET | Refills: 0 | Status: SHIPPED | OUTPATIENT
Start: 2025-03-26 | End: 2025-04-25

## 2025-03-26 RX ORDER — TIZANIDINE 2 MG/1
2 TABLET ORAL EVERY 8 HOURS PRN
Qty: 60 TABLET | Refills: 0 | Status: SHIPPED | OUTPATIENT
Start: 2025-03-26 | End: 2025-04-25

## 2025-03-26 NOTE — PROGRESS NOTES
Patient ID: Earlene Nassar is a 34 y.o. female.    Chief Complaint: General Illness (Entered automatically based on patient selection in [facility].)          274}  The patient initiated a request through [facility] on 3/26/2025 for evaluation and management with a chief complaint of General Illness (Entered automatically based on patient selection in [facility].)     I evaluated the questionnaire submission on 03/26/2025 .    Total Time (in minutes): 12     [facility] P[facility] Evisit [facility]-Medication    3/26/2025  7:49 AM CDT - Filed by Patient   What do you need help with? Medication Request   Do you agree to participate in an E-Visit? Yes   If you have any of the following symptoms, please present to your local emergency room or call 911:  I acknowledge   Medication requests for narcotics will not be addressed via an E-Visit.  Please schedule an appointment. I acknowledge   Do you have any of the following pregnancy-related conditions? None   Do you want to address a new or existing medication? I would like to start a new medication that I do not already take   What is the main issue you would like addressed today? Neck pain   What is the name of the medication that you would like to start? Naproxen   Have you taken a similar medication in the past? Yes   What was the name of the similar medication? Same   Why are you no longer on that medication? No specific reason    What medical condition is the  medication intended to treat? Pain/inflammation   Provide any additional information you feel is important.    Please attach any relevant images or files    Are you able to take your vital signs? No          Active Problem List with Overview Notes    Diagnosis Date Noted    Family history of early CAD 10/17/2023    History of pre-eclampsia 10/17/2023    Anxiety 03/01/2023    Pelvic floor weakness 05/11/2017      Recent Labs Obtained:  Lab Results   Component Value Date    WBC 5.28 09/09/2022    HGB 12.5 09/09/2022    HCT 40.0  09/09/2022    MCV 81 (L) 09/09/2022     09/09/2022     09/09/2022    K 4.2 09/09/2022    GLU 88 09/09/2022    CREATININE 0.7 09/09/2022    EGFRNORACEVR >60.0 09/09/2022    TSH 1.055 09/09/2022      Review of patient's allergies indicates:   Allergen Reactions    Codeine Other (See Comments)     Per pt's mother she had rash as a child but took narcotics after Tonsillectomy without problems       Encounter Diagnosis   Name Primary?    Neck pain Yes        No orders of the defined types were placed in this encounter.     Medications Ordered This Encounter   Medications    naproxen (NAPROSYN) 500 MG tablet     Sig: Take 1 tablet (500 mg total) by mouth 2 (two) times daily as needed (pain).     Dispense:  60 tablet     Refill:  0    tiZANidine (ZANAFLEX) 2 MG tablet     Sig: Take 1 tablet (2 mg total) by mouth every 8 (eight) hours as needed (muscle spasms).     Dispense:  60 tablet     Refill:  0        E-Visit Time Tracking:    Day 1 Time (in minutes): 12    Total Time (in minutes): 12      274}

## 2025-04-15 ENCOUNTER — PATIENT MESSAGE (OUTPATIENT)
Dept: INTERNAL MEDICINE | Facility: CLINIC | Age: 35
End: 2025-04-15
Payer: COMMERCIAL

## 2025-04-17 ENCOUNTER — TELEPHONE (OUTPATIENT)
Dept: INTERNAL MEDICINE | Facility: CLINIC | Age: 35
End: 2025-04-17

## 2025-04-17 ENCOUNTER — OFFICE VISIT (OUTPATIENT)
Dept: INTERNAL MEDICINE | Facility: CLINIC | Age: 35
End: 2025-04-17
Payer: COMMERCIAL

## 2025-04-17 VITALS
HEART RATE: 94 BPM | OXYGEN SATURATION: 97 % | DIASTOLIC BLOOD PRESSURE: 100 MMHG | SYSTOLIC BLOOD PRESSURE: 164 MMHG | WEIGHT: 147.25 LBS | BODY MASS INDEX: 26.09 KG/M2 | HEIGHT: 63 IN

## 2025-04-17 DIAGNOSIS — Z87.59 HISTORY OF PRE-ECLAMPSIA: ICD-10-CM

## 2025-04-17 DIAGNOSIS — I10 ESSENTIAL HYPERTENSION: ICD-10-CM

## 2025-04-17 DIAGNOSIS — R22.1 LOCALIZED SWELLING, MASS AND LUMP, NECK: ICD-10-CM

## 2025-04-17 DIAGNOSIS — Z82.49 FAMILY HISTORY OF EARLY CAD: ICD-10-CM

## 2025-04-17 DIAGNOSIS — Z00.00 ROUTINE MEDICAL EXAM: Primary | ICD-10-CM

## 2025-04-17 PROCEDURE — 99999 PR PBB SHADOW E&M-EST. PATIENT-LVL V: CPT | Mod: PBBFAC,,, | Performed by: NURSE PRACTITIONER

## 2025-04-17 RX ORDER — NIFEDIPINE 30 MG/1
30 TABLET, EXTENDED RELEASE ORAL DAILY
Qty: 30 TABLET | Refills: 2 | Status: SHIPPED | OUTPATIENT
Start: 2025-04-17 | End: 2026-04-17

## 2025-04-17 NOTE — PROGRESS NOTES
History of Present Illness   Earlene Nassar is a 34 y.o. woman with medical history as listed below who presents today for routine physical exam. She did not have labs prior to our visit. 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 She does report history of pre-eclampsia while pregnant and postpartum that required induction of labor and medication management. She also reports a family history of CAD. Pertinent negatives as listed in ROS. We will address HM today.    Past Medical History:   Diagnosis Date    Anxiety     Xanax prn    Asthma due to environmental allergies     Xyzal       Past Surgical History:   Procedure Laterality Date    ADENOIDECTOMY       SECTION N/A 2022    Procedure:  SECTION;  Surgeon: Sariah Mejia MD;  Location: [facility], operated by [facility] L[facility]D;  Service: OB/GYN;  Laterality: N/A;  Hx 4th degree tear    TONSILLECTOMY         Social History     Socioeconomic History    Marital status:    Tobacco Use    Smoking status: Never    Smokeless tobacco: Never   Substance and Sexual Activity    Alcohol use: Yes     Comment: occasionally    Drug use: No    Sexual activity: Yes     Partners: Male     Birth control/protection: None     Social Drivers of Health     Financial Resource Strain: Low Risk  (10/16/2023)    Overall Financial Resource Strain (CARDIA)     Difficulty of Paying Living Expenses: Not hard at all   Food Insecurity: No Food Insecurity (10/16/2023)    Hunger Vital Sign     Worried About Running Out of Food in the Last Year: Never  true     Ran Out of Food in the Last Year: Never true   Transportation Needs: No Transportation Needs (10/16/2023)    PRAPARE - Transportation     Lack of Transportation (Medical): No     Lack of Transportation (Non-Medical): No   Physical Activity: Insufficiently Active (10/16/2023)    Exercise Vital Sign     Days of Exercise per Week: 2 days     Minutes of Exercise per Session: 60 min   Stress: No Stress Concern Present (10/16/2023)    [employer] - Occupational Stress Questionnaire     Feeling of Stress : Only a little   Recent Concern: Stress - Stress Concern Present (7/19/2023)    [employer] - Occupational Stress Questionnaire     Feeling of Stress : To some extent   Housing Stability: Low Risk  (10/16/2023)    Housing Stability Vital Sign     Unable to Pay for Housing in the Last Year: No     Number of Places Lived in the Last Year: 1     Unstable Housing in the Last Year: No       Family History   Problem Relation Name Age of Onset    No Known Problems Mother      Heart disease Father      No Known Problems Maternal Grandmother      No Known Problems Maternal Grandfather      No Known Problems Paternal Grandmother      No Known Problems Paternal Grandfather      No Known Problems Sister      Breast cancer Neg Hx      Colon cancer Neg Hx      Ovarian cancer Neg Hx      Cervical cancer Neg Hx      Endometrial cancer Neg Hx      Vaginal cancer Neg Hx         Review of Systems  Review of Systems   Constitutional:  Negative for malaise/fatigue and weight loss.   HENT:  Negative for hearing loss and tinnitus.    Eyes:  Negative for blurred vision and double vision.   Respiratory:  Negative for shortness of breath and wheezing.    Cardiovascular:  Negative for chest pain, palpitations, orthopnea, claudication and leg swelling.   Gastrointestinal:  Negative for blood in stool and melena.   Genitourinary:  Negative for flank pain and hematuria.  "  Musculoskeletal:  Negative for back pain, joint pain and neck pain.   Skin:  Negative for itching and rash.   Neurological:  Negative for dizziness, loss of consciousness and headaches.   Endo/Heme/Allergies:  Negative for polydipsia.   Psychiatric/Behavioral:  Negative for depression. The patient is not nervous/anxious and does not have insomnia.      A complete review of systems was otherwise negative.    Physical Exam  BP (!) 164/100 (BP Location: Left arm, Patient Position: Sitting)   Pulse 94   Ht 5' 3" (1.6 m)   Wt 66.8 kg (147 lb 4.3 oz)   LMP 03/24/2025   SpO2 97%   BMI 26.09 kg/m²   General appearance: alert, appears stated age, cooperative, and no distress  Head: Normocephalic, without obvious abnormality, atraumatic  Eyes: conjunctivae/corneas clear. PERRL, EOM's intact. Fundi benign.  Ears: normal TM's and external ear canals both ears  Nose: Nares normal. Septum midline. Mucosa normal. No drainage or sinus tenderness.  Throat: lips, mucosa, and tongue normal; teeth and gums normal  Neck: no adenopathy, no carotid bruit, no JVD, supple, symmetrical, trachea midline, and thyroid: enlarged  Back: symmetric, no curvature. ROM normal. No CVA tenderness.  Lungs: clear to auscultation bilaterally  Heart: regular rate and rhythm, S1, S2 normal, no murmur, click, rub or gallop  Abdomen: soft, non-tender; bowel sounds normal; no masses,  no organomegaly  Extremities: extremities normal, atraumatic, no cyanosis or edema  Pulses: 2+ and symmetric  Skin: Skin color, texture, turgor normal. No rashes or lesions  Neurologic: Grossly normal, no focal neurological deficits    Assessment/Plan  Routine medical exam  Age appropriate screening recommendations and HM were discussed and updated.  Discussed importance of heart healthy diet and exercise.  Routine labs as below.  Follow-up with [facility] in 2-4 weeks for BP management.  -     CBC Auto Differential; Future; Expected date: 04/17/2025  -     Comprehensive " Metabolic Panel; Future; Expected date: 04/17/2025  -     LIPID PANEL; Future; Expected date: 04/17/2025  -     TSH; Future; Expected date: 04/17/2025  -     T4, Free; Future; Expected date: 04/17/2025    Essential hypertension  Start Procardia.  Check labs.  Referral to women's cardiology at [facility] given history of pre-eclampsia.  -     Ambulatory referral/consult to Cardiology; Future; Expected date: 04/24/2025  -     NIFEdipine (PROCARDIA-XL) 30 MG (OSM) 24 hr tablet; Take 1 tablet (30 mg total) by mouth once daily.  Dispense: 30 tablet; Refill: 2  -     Protein/Creatinine Ratio, Urine; Future; Expected date: 04/17/2025    History of pre-eclampsia  Start Procardia.  Check labs.  Referral to women's cardiology at [facility] given history of pre-eclampsia.  -     Ambulatory referral/consult to Cardiology; Future; Expected date: 04/24/2025  -     NIFEdipine (PROCARDIA-XL) 30 MG (OSM) 24 hr tablet; Take 1 tablet (30 mg total) by mouth once daily.  Dispense: 30 tablet; Refill: 2  -     Protein/Creatinine Ratio, Urine; Future; Expected date: 04/17/2025    Family history of early CAD  Start Procardia.  Check labs.  Referral to women's cardiology at [facility] given history of pre-eclampsia.  -     Ambulatory referral/consult to Cardiology; Future; Expected date: 04/24/2025    Localized swelling, mass and lump, neck  Check US thyroid and TFTs.  -     US Soft Tissue Head Neck; Future; Expected date: 04/17/2025    Patient has verbalized understanding and is in agreement with plan of care.    Follow up for 2-4 weeks for BP follow-up and lab discussion.

## 2025-04-17 NOTE — PATIENT INSTRUCTIONS
We are going to schedule fasting labs with urine.  We are starting a blood pressure medicine.  Continue to check your blood pressure.  US thyroid ordered.  Referral to women's cardiology at [facility].  Follow-up with me 2-4 weeks.

## 2025-04-17 NOTE — TELEPHONE ENCOUNTER
----- Message from Med Assistant Kan sent at 4/17/2025  2:58 PM CDT -----  Hi Dee patient just checked out stated you [facility] CardiologyWe are unable to schedule for that department.No Template is coming up nor ProvidersWas it a certain Provider you wanted Patent to see?

## 2025-04-22 ENCOUNTER — PATIENT MESSAGE (OUTPATIENT)
Dept: INTERNAL MEDICINE | Facility: CLINIC | Age: 35
End: 2025-04-22
Payer: COMMERCIAL

## 2025-04-22 DIAGNOSIS — I10 ESSENTIAL HYPERTENSION: Primary | ICD-10-CM

## 2025-04-24 RX ORDER — NIFEDIPINE 60 MG/1
60 TABLET, EXTENDED RELEASE ORAL DAILY
Qty: 30 TABLET | Refills: 11 | Status: SHIPPED | OUTPATIENT
Start: 2025-04-24 | End: 2026-04-24

## 2025-04-28 ENCOUNTER — HOSPITAL ENCOUNTER (OUTPATIENT)
Dept: RADIOLOGY | Facility: HOSPITAL | Age: 35
Discharge: HOME | End: 2025-04-28
Attending: NURSE PRACTITIONER
Payer: COMMERCIAL

## 2025-04-28 DIAGNOSIS — R22.1 LOCALIZED SWELLING, MASS AND LUMP, NECK: ICD-10-CM

## 2025-04-28 PROCEDURE — 76536 US EXAM OF HEAD AND NECK: CPT | Mod: TC

## 2025-04-28 PROCEDURE — 76536 US EXAM OF HEAD AND NECK: CPT | Mod: 26,,, | Performed by: RADIOLOGY

## 2025-04-29 ENCOUNTER — RESULTS FOLLOW-UP (OUTPATIENT)
Dept: INTERNAL MEDICINE | Facility: CLINIC | Age: 35
End: 2025-04-29

## 2025-04-29 ENCOUNTER — LAB VISIT (OUTPATIENT)
Dept: LAB | Facility: HOSPITAL | Age: 35
End: 2025-04-29
Attending: NURSE PRACTITIONER
Payer: COMMERCIAL

## 2025-04-29 DIAGNOSIS — Z00.00 ROUTINE MEDICAL EXAM: ICD-10-CM

## 2025-04-29 DIAGNOSIS — F41.1 GAD (GENERALIZED ANXIETY DISORDER): ICD-10-CM

## 2025-04-29 LAB
ABSOLUTE EOSINOPHIL (OHS): 0.08 K/UL
ABSOLUTE MONOCYTE (OHS): 0.66 K/UL (ref 0.3–1)
ABSOLUTE NEUTROPHIL COUNT (OHS): 3.73 K/UL (ref 1.8–7.7)
ALBUMIN SERPL BCP-MCNC: 3.9 G/DL (ref 3.5–5.2)
ALP SERPL-CCNC: 63 UNIT/L (ref 40–150)
ALT SERPL W/O P-5'-P-CCNC: 16 UNIT/L (ref 10–44)
ANION GAP (OHS): 11 MMOL/L (ref 8–16)
AST SERPL-CCNC: 16 UNIT/L (ref 11–45)
BASOPHILS # BLD AUTO: 0.06 K/UL
BASOPHILS NFR BLD AUTO: 0.8 %
BILIRUB SERPL-MCNC: 0.5 MG/DL (ref 0.1–1)
BUN SERPL-MCNC: 14 MG/DL (ref 6–20)
CALCIUM SERPL-MCNC: 9.1 MG/DL (ref 8.7–10.5)
CHLORIDE SERPL-SCNC: 103 MMOL/L (ref 95–110)
CHOLEST SERPL-MCNC: 242 MG/DL (ref 120–199)
CHOLEST/HDLC SERPL: 4.2 {RATIO} (ref 2–5)
CO2 SERPL-SCNC: 25 MMOL/L (ref 23–29)
CREAT SERPL-MCNC: 0.7 MG/DL (ref 0.5–1.4)
ERYTHROCYTE [DISTWIDTH] IN BLOOD BY AUTOMATED COUNT: 13.4 % (ref 11.5–14.5)
GFR SERPLBLD CREATININE-BSD FMLA CKD-EPI: >60 ML/MIN/1.73/M2
GLUCOSE SERPL-MCNC: 98 MG/DL (ref 70–110)
HCT VFR BLD AUTO: 41.8 % (ref 37–48.5)
HDLC SERPL-MCNC: 57 MG/DL (ref 40–75)
HDLC SERPL: 23.6 % (ref 20–50)
HGB BLD-MCNC: 13.1 GM/DL (ref 12–16)
IMM GRANULOCYTES # BLD AUTO: 0.02 K/UL (ref 0–0.04)
IMM GRANULOCYTES NFR BLD AUTO: 0.3 % (ref 0–0.5)
LDLC SERPL CALC-MCNC: 167.2 MG/DL (ref 63–159)
LYMPHOCYTES # BLD AUTO: 2.72 K/UL (ref 1–4.8)
MCH RBC QN AUTO: 27.2 PG (ref 27–31)
MCHC RBC AUTO-ENTMCNC: 31.3 G/DL (ref 32–36)
MCV RBC AUTO: 87 FL (ref 82–98)
NONHDLC SERPL-MCNC: 185 MG/DL
NUCLEATED RBC (/100WBC) (OHS): 0 /100 WBC
PLATELET # BLD AUTO: 420 K/UL (ref 150–450)
PMV BLD AUTO: 9.9 FL (ref 9.2–12.9)
POTASSIUM SERPL-SCNC: 3.9 MMOL/L (ref 3.5–5.1)
PROT SERPL-MCNC: 7.6 GM/DL (ref 6–8.4)
RBC # BLD AUTO: 4.82 M/UL (ref 4–5.4)
RELATIVE EOSINOPHIL (OHS): 1.1 %
RELATIVE LYMPHOCYTE (OHS): 37.4 % (ref 18–48)
RELATIVE MONOCYTE (OHS): 9.1 % (ref 4–15)
RELATIVE NEUTROPHIL (OHS): 51.3 % (ref 38–73)
SODIUM SERPL-SCNC: 139 MMOL/L (ref 136–145)
T4 FREE SERPL-MCNC: 0.9 NG/DL (ref 0.71–1.51)
TRIGL SERPL-MCNC: 89 MG/DL (ref 30–150)
TSH SERPL-ACNC: 0.82 UIU/ML (ref 0.4–4)
WBC # BLD AUTO: 7.27 K/UL (ref 3.9–12.7)

## 2025-04-29 PROCEDURE — 84439 ASSAY OF FREE THYROXINE: CPT

## 2025-04-29 PROCEDURE — 82040 ASSAY OF SERUM ALBUMIN: CPT

## 2025-04-29 PROCEDURE — 84443 ASSAY THYROID STIM HORMONE: CPT

## 2025-04-29 PROCEDURE — 36415 COLL VENOUS BLD VENIPUNCTURE: CPT | Mod: PO

## 2025-04-29 PROCEDURE — 85025 COMPLETE CBC W/AUTO DIFF WBC: CPT

## 2025-04-29 PROCEDURE — 80061 LIPID PANEL: CPT

## 2025-04-29 RX ORDER — SERTRALINE HYDROCHLORIDE 50 MG/1
50 TABLET, FILM COATED ORAL DAILY
Qty: 90 TABLET | Refills: 3 | Status: SHIPPED | OUTPATIENT
Start: 2025-04-29 | End: 2026-04-29

## 2025-04-29 NOTE — TELEPHONE ENCOUNTER
Refill Decision Note   Earlene Nassar  is requesting a refill authorization.  Brief Assessment and Rationale for Refill:  Approve     Medication Therapy Plan:         Comments:     Note composed:3:22 PM 04/29/2025

## 2025-05-02 ENCOUNTER — HOSPITAL ENCOUNTER (OUTPATIENT)
Dept: CARDIOLOGY | Facility: CLINIC | Age: 35
Discharge: HOME | End: 2025-05-02
Payer: COMMERCIAL

## 2025-05-02 ENCOUNTER — OFFICE VISIT (OUTPATIENT)
Dept: INTERNAL MEDICINE | Facility: CLINIC | Age: 35
End: 2025-05-02
Payer: COMMERCIAL

## 2025-05-02 ENCOUNTER — OFFICE VISIT (OUTPATIENT)
Dept: CARDIOLOGY | Facility: CLINIC | Age: 35
End: 2025-05-02
Payer: COMMERCIAL

## 2025-05-02 ENCOUNTER — PATIENT MESSAGE (OUTPATIENT)
Dept: INTERNAL MEDICINE | Facility: CLINIC | Age: 35
End: 2025-05-02

## 2025-05-02 ENCOUNTER — LAB VISIT (OUTPATIENT)
Dept: LAB | Facility: HOSPITAL | Age: 35
End: 2025-05-02
Attending: INTERNAL MEDICINE
Payer: COMMERCIAL

## 2025-05-02 VITALS
WEIGHT: 145.94 LBS | SYSTOLIC BLOOD PRESSURE: 137 MMHG | DIASTOLIC BLOOD PRESSURE: 94 MMHG | HEIGHT: 63 IN | OXYGEN SATURATION: 100 % | HEART RATE: 82 BPM | BODY MASS INDEX: 25.86 KG/M2

## 2025-05-02 DIAGNOSIS — Z82.49 FAMILY HISTORY OF EARLY CAD: ICD-10-CM

## 2025-05-02 DIAGNOSIS — Z87.59 HISTORY OF PRE-ECLAMPSIA: ICD-10-CM

## 2025-05-02 DIAGNOSIS — I10 ESSENTIAL HYPERTENSION: ICD-10-CM

## 2025-05-02 DIAGNOSIS — I10 HYPERTENSION, UNSPECIFIED TYPE: Primary | ICD-10-CM

## 2025-05-02 DIAGNOSIS — Z13.6 ENCOUNTER FOR SCREENING FOR CARDIOVASCULAR DISORDERS: ICD-10-CM

## 2025-05-02 DIAGNOSIS — Z13.6 ENCOUNTER FOR SCREENING FOR CARDIOVASCULAR DISORDERS: Primary | ICD-10-CM

## 2025-05-02 DIAGNOSIS — E78.5 HYPERLIPIDEMIA, UNSPECIFIED HYPERLIPIDEMIA TYPE: ICD-10-CM

## 2025-05-02 LAB
CORTIS SERPL-MCNC: 8.9 UG/DL (ref 4.3–22.4)
CRP SERPL-MCNC: 1.41 MG/L
OHS QRS DURATION: 86 MS
OHS QTC CALCULATION: 414 MS

## 2025-05-02 PROCEDURE — 82533 TOTAL CORTISOL: CPT

## 2025-05-02 PROCEDURE — 86141 C-REACTIVE PROTEIN HS: CPT

## 2025-05-02 PROCEDURE — 84244 ASSAY OF RENIN: CPT

## 2025-05-02 PROCEDURE — 36415 COLL VENOUS BLD VENIPUNCTURE: CPT

## 2025-05-02 PROCEDURE — 99999 PR PBB SHADOW E&M-EST. PATIENT-LVL IV: CPT | Mod: PBBFAC,,, | Performed by: INTERNAL MEDICINE

## 2025-05-02 PROCEDURE — 83695 ASSAY OF LIPOPROTEIN(A): CPT

## 2025-05-02 RX ORDER — VIT C/E/ZN/COPPR/LUTEIN/ZEAXAN 250MG-90MG
CAPSULE ORAL
COMMUNITY
Start: 2025-05-02

## 2025-05-02 NOTE — PROGRESS NOTES
The patient location is: [facility]  The chief complaint leading to consultation is: HTN follow-up    Visit type: audiovisual    Face to Face time with patient: 15 minutes  20 minutes of total time spent on the encounter, which includes face to face time and non-face to face time preparing to see the patient (eg, review of tests), Obtaining and/or reviewing separately obtained history, Documenting clinical information in the electronic or other health record, Independently interpreting results (not separately reported) and communicating results to the patient/family/caregiver, or Care coordination (not separately reported).         Each patient to whom he or she provides medical services by telemedicine is:  (1) informed of the relationship between the physician and patient and the respective role of any other health care provider with respect to management of the patient; and (2) notified that he or she may decline to receive medical services by telemedicine and may withdraw from such care at any time.    Notes:   History of Present Illness   Earlene Nassar is a 34 y.o. woman with medical history as listed below with virtual visit today for follow-up, HTN. She is taking medications as prescribed. Home BP log: shows improvement in her BP, today 130s/90s. She had follow-up with cardiology today with labs for secondary cause of HTN. She is feeling well and has no complaints today.    Of note, we also reviewed her recent labs and thyroid US.       Past Medical History:   Diagnosis Date    Anxiety     Xanax prn    Asthma due to environmental allergies     Xyzal       Past Surgical History:   Procedure Laterality Date    ADENOIDECTOMY       SECTION N/A 2022    Procedure:  SECTION;  Surgeon: Sariah Mejia MD;  Location: [facility];  Service: OB/GYN;  Laterality: N/A;  Hx 4th degree tear    TONSILLECTOMY         Social History     Socioeconomic History    Marital status:    Tobacco  Use    Smoking status: Never    Smokeless tobacco: Never   Substance and Sexual Activity    Alcohol use: Yes     Comment: occasionally    Drug use: No    Sexual activity: Yes     Partners: Male     Birth control/protection: None     Social Drivers of Health     Financial Resource Strain: Low Risk  (5/2/2025)    Overall Financial Resource Strain (CARDIA)     Difficulty of Paying Living Expenses: Not hard at all   Food Insecurity: No Food Insecurity (5/2/2025)    Hunger Vital Sign     Worried About Running Out of Food in the Last Year: Never true     Ran Out of Food in the Last Year: Never true   Transportation Needs: No Transportation Needs (5/2/2025)    PRAPARE - Transportation     Lack of Transportation (Medical): No     Lack of Transportation (Non-Medical): No   Physical Activity: Sufficiently Active (5/2/2025)    Exercise Vital Sign     Days of Exercise per Week: 3 days     Minutes of Exercise per Session: 50 min   Stress: Stress Concern Present (5/2/2025)    [employer] - Occupational Stress Questionnaire     Feeling of Stress : Rather much   Housing Stability: Low Risk  (5/2/2025)    Housing Stability Vital Sign     Unable to Pay for Housing in the Last Year: No     Number of Times Moved in the Last Year: 0     Homeless in the Last Year: No       Family History   Problem Relation Name Age of Onset    No Known Problems Mother      Heart disease Father      No Known Problems Maternal Grandmother      No Known Problems Maternal Grandfather      No Known Problems Paternal Grandmother      No Known Problems Paternal Grandfather      No Known Problems Sister      Breast cancer Neg Hx      Colon cancer Neg Hx      Ovarian cancer Neg Hx      Cervical cancer Neg Hx      Endometrial cancer Neg Hx      Vaginal cancer Neg Hx         Review of Systems  Review of Systems   Constitutional:  Negative for malaise/fatigue and weight loss.   HENT:  Negative for hearing loss.    Eyes:  Negative for  blurred vision, double vision and discharge.   Respiratory:  Negative for shortness of breath and wheezing.    Cardiovascular:  Negative for chest pain, palpitations, orthopnea, claudication, leg swelling and PND.   Gastrointestinal:  Negative for blood in stool, constipation, diarrhea, melena and vomiting.   Genitourinary:  Negative for dysuria, flank pain and hematuria.   Musculoskeletal:  Negative for neck pain.   Neurological:  Negative for dizziness, loss of consciousness, weakness and headaches.   Endo/Heme/Allergies:  Negative for polydipsia.       A complete review of systems was otherwise negative.    Physical Exam  General appearance: alert, appears stated age, cooperative, and no distress  Lungs: respirations are even and unlabored  Skin: no visible rash or lesions  Neurologic: Grossly normal    Assessment/Plan  Hypertension, unspecified type  Continue Procardia.  Work-up per cardiology.  Will follow-up next week for home BP log.    History of pre-eclampsia  Now with elevated BP.  Appreciate cardiology input.  Will follow-up next week for BP log.    Family history of early CAD  Calcium scoring on Monday.    Hyperlipidemia, unspecified hyperlipidemia type  The patient is asked to make an attempt to improve diet and exercise patterns to aid in medical management of this problem.  Red yeast rice per cardiology recommendations.    Answers submitted by the patient for this visit:  Review of Systems Questionnaire (Submitted on 4/29/2025)  activity change: No  unexpected weight change: No  rhinorrhea: No  trouble swallowing: No  visual disturbance: No  chest tightness: No  polyuria: No  difficulty urinating: No  menstrual problem: No  joint swelling: No  arthralgias: No  confusion: No  dysphoric mood: No    Patient has verbalized understanding and is in agreement with plan of care.    Follow up in about 3 months (around 8/2/2025).

## 2025-05-02 NOTE — PROGRESS NOTES
"Subjective:   Patient ID:  Earlene Nassar is a 34 y.o. female is a new patient who presents for evaluation of Chest Pain      HPI:   Pr eclampsia in second pregnancy in  at 34 wks and was induced (proteinurea)- , first delivery was vaginal (4th degree tear)  She was admitted back and was started on procardia  She was not delivered on BP medication  -   In 2025 she was found to be hypertensive  And then recently was started on procardia increased to 60 mg  She is at high cholesterol in adult life  Father has had CABG /stent in 50s  Mothers has high cholesterol  Very active with 2 kids  Walks an hour a day      Problem List[1]  BP (!) 137/94   Pulse 82   Ht 5' 3" (1.6 m)   Wt 66.2 kg (145 lb 15.1 oz)   LMP 2025   SpO2 100%   BMI 25.85 kg/m²   Body mass index is 25.85 kg/m².  Estimated Creatinine Clearance: 105.5 mL/min (based on SCr of 0.7 mg/dL).    Lab Results   Component Value Date     2025     2022    K 3.9 2025    K 4.2 2022     2025     2022    CO2 25 2025    CO2 24 2022    BUN 14 2025    CREATININE 0.7 2025    GLU 98 2025    GLU 88 2022    AST 16 2025    AST 19 2022    ALT 16 2025    ALT 20 2022    ALBUMIN 3.9 2025    ALBUMIN 4.2 2022    PROT 7.6 2025    PROT 8.0 2022    BILITOT 0.5 2025    BILITOT 0.4 2022    WBC 7.27 2025    HGB 13.1 2025    HGB 12.5 2022    HCT 41.8 2025    HCT 40.0 2022    MCV 87 2025    MCV 81 (L) 2022     2025     2022    TSH 0.820 2025    TSH 1.055 2022    CHOL 242 (H) 2025    CHOL 232 (H) 2022    HDL 57 2025    LDLCALC 167.2 (H) 2025    TRIG 89 2025    TRIG 81 2022       Current Outpatient Medications   Medication Sig    azelastine (ASTELIN) 137 mcg (0.1 %) nasal spray SPRAY 2 " SPRAYS BY NASAL ROUTE 2 TIMES DAILY.    fluticasone propionate (FLONASE) 50 mcg/actuation nasal spray SPRAY 2 SPRAYS BY EACH NOSTRIL ROUTE ONCE DAILY.    fluticasone propionate (FLONASE) 50 mcg/actuation nasal spray USE 1 SPRAY (50 MCG TOTAL) IN EACH NOSTRIL ONCE DAILY    NIFEdipine (PROCARDIA-XL) 60 MG (OSM) 24 hr tablet Take 1 tablet (60 mg total) by mouth once daily.    sertraline (ZOLOFT) 50 MG tablet Take 1 tablet (50 mg total) by mouth once daily.    levocetirizine (XYZAL) 5 MG tablet Take 1 tablet (5 mg total) by mouth every evening.     No current facility-administered medications for this visit.       Review of Systems   Constitutional: Negative for chills, decreased appetite, malaise/fatigue, night sweats, weight gain and weight loss.   Eyes:  Negative for blurred vision, double vision, visual disturbance and visual halos.   Cardiovascular:  Negative for chest pain, claudication, cyanosis, dyspnea on exertion, irregular heartbeat, leg swelling, near-syncope, orthopnea, palpitations, paroxysmal nocturnal dyspnea and syncope.   Respiratory:  Negative for cough, hemoptysis, snoring, sputum production and wheezing.    Endocrine: Negative for cold intolerance, heat intolerance, polydipsia and polyphagia.   Hematologic/Lymphatic: Negative for adenopathy and bleeding problem. Does not bruise/bleed easily.   Skin:  Negative for flushing, itching, poor wound healing and rash.   Musculoskeletal:  Negative for arthritis, back pain, falls, gout, joint pain, joint swelling, muscle cramps, muscle weakness, myalgias, neck pain and stiffness.   Gastrointestinal:  Negative for bloating, abdominal pain, anorexia, diarrhea, dysphagia, excessive appetite, flatus, hematemesis, jaundice, melena and nausea.   Genitourinary:  Negative for hesitancy and incomplete emptying.   Neurological:  Negative for aphonia, brief paralysis, difficulty with concentration, disturbances in coordination, excessive daytime sleepiness, dizziness,  focal weakness, light-headedness, loss of balance and weakness.   Psychiatric/Behavioral:  Negative for altered mental status, depression, hallucinations, hypervigilance, memory loss, substance abuse and suicidal ideas. The patient does not have insomnia and is not nervous/anxious.        Objective:   Physical Exam  Constitutional:       General: She is not in acute distress.     Appearance: She is well-developed. She is not diaphoretic.   HENT:      Head: Normocephalic and atraumatic.      Nose: Nose normal.      Mouth/Throat:      Pharynx: No oropharyngeal exudate.   Eyes:      General: No scleral icterus.        Right eye: No discharge.         Left eye: No discharge.      Conjunctiva/sclera: Conjunctivae normal.      Pupils: Pupils are equal, round, and reactive to light.   Neck:      Thyroid: No thyromegaly.      Vascular: No JVD.      Trachea: No tracheal deviation.   Cardiovascular:      Rate and Rhythm: Normal rate and regular rhythm.      Pulses: Intact distal pulses.      Heart sounds: Normal heart sounds. No murmur heard.     No friction rub. No gallop.   Pulmonary:      Effort: Pulmonary effort is normal. No respiratory distress.      Breath sounds: Normal breath sounds. No stridor. No wheezing or rales.   Chest:      Chest wall: No tenderness.   Abdominal:      General: Bowel sounds are normal. There is no distension.      Palpations: Abdomen is soft. There is no mass.      Tenderness: There is no abdominal tenderness. There is no guarding or rebound.   Musculoskeletal:         General: No tenderness. Normal range of motion.      Cervical back: Normal range of motion and neck supple.   Lymphadenopathy:      Cervical: No cervical adenopathy.   Skin:     General: Skin is warm.      Coloration: Skin is not pale.      Findings: No erythema or rash.   Neurological:      Mental Status: She is alert and oriented to person, place, and time.      Cranial Nerves: No cranial nerve deficit.      Motor: No abnormal  muscle tone.      Coordination: Coordination normal.      Deep Tendon Reflexes: Reflexes are normal and symmetric. Reflexes normal.   Psychiatric:         Behavior: Behavior normal.         Thought Content: Thought content normal.         Judgment: Judgment normal.         Assessment:     1. Essential hypertension    2. History of pre-eclampsia    3. Family history of early CAD      Plan:   Earlene was seen today for chest pain.    Diagnoses and all orders for this visit:    Encounter for screening for cardiovascular disorders  -     LIPOPROTEIN A (LPA); Future  -     CRP, High Sensitivity; Future  -     CT Calcium Scoring Cardiac; Future  -     IN OFFICE EKG 12-LEAD (to Muse)    Essential hypertension  -     Ambulatory referral/consult to Cardiology  -     IN OFFICE EKG 12-LEAD (to Muse)  -     Aldosterone/Renin Activity Ratio; Future  -     CORTISOL, 8AM; Future    History of pre-eclampsia  -     Ambulatory referral/consult to Cardiology  -     IN OFFICE EKG 12-LEAD (to Muse)    Family history of early CAD  -     Ambulatory referral/consult to Cardiology      R/o secondary causes of hypertension. Ok to continue procardia. Risk stratification fro CAD.  We discussed long term risk for CAD,herson.   Ok to initiate red yeast rice. Her LDL not in the FH range.   We  dicussed sources of high cholesterol           [1]   Patient Active Problem List  Diagnosis    Pelvic floor weakness    Anxiety    Family history of early CAD    History of pre-eclampsia

## 2025-05-05 ENCOUNTER — HOSPITAL ENCOUNTER (OUTPATIENT)
Dept: RADIOLOGY | Facility: HOSPITAL | Age: 35
Discharge: HOME | End: 2025-05-05
Attending: INTERNAL MEDICINE

## 2025-05-05 DIAGNOSIS — Z13.6 ENCOUNTER FOR SCREENING FOR CARDIOVASCULAR DISORDERS: ICD-10-CM

## 2025-05-05 LAB — W LP(A): 327 NMOL/L

## 2025-05-05 PROCEDURE — 75571 CT HRT W/O DYE W/CA TEST: CPT | Mod: TC

## 2025-05-05 PROCEDURE — 75571 CT HRT W/O DYE W/CA TEST: CPT | Mod: 26,,, | Performed by: RADIOLOGY

## 2025-05-06 ENCOUNTER — PATIENT MESSAGE (OUTPATIENT)
Dept: INTERNAL MEDICINE | Facility: CLINIC | Age: 35
End: 2025-05-06
Payer: COMMERCIAL

## 2025-05-06 ENCOUNTER — RESULTS FOLLOW-UP (OUTPATIENT)
Dept: CARDIOLOGY | Facility: CLINIC | Age: 35
End: 2025-05-06

## 2025-05-06 LAB
ALDOST SERPL-MCNC: 22.2 NG/DL
ALDOST/RENIN PLAS-RTO: 2.1 RATIO
RENIN PLAS-CCNC: 10.7 NG/ML/HR

## 2025-05-14 ENCOUNTER — PATIENT MESSAGE (OUTPATIENT)
Dept: INTERNAL MEDICINE | Facility: CLINIC | Age: 35
End: 2025-05-14
Payer: COMMERCIAL

## 2025-05-15 ENCOUNTER — TELEPHONE (OUTPATIENT)
Dept: INTERNAL MEDICINE | Facility: CLINIC | Age: 35
End: 2025-05-15
Payer: COMMERCIAL

## 2025-05-15 VITALS — SYSTOLIC BLOOD PRESSURE: 134 MMHG | DIASTOLIC BLOOD PRESSURE: 95 MMHG

## 2025-05-15 NOTE — TELEPHONE ENCOUNTER
Called and spoke to patient  about scheduling an appointment for a nurse BP check. Patient is requesting early Monday morning before 11 or after 1 pm on the [facility]. Will work with checkout to find an appt. Informed her to make sure she brings her home BP cuff.

## 2025-05-19 ENCOUNTER — DOCUMENTATION ONLY (OUTPATIENT)
Dept: INTERNAL MEDICINE | Facility: CLINIC | Age: 35
End: 2025-05-19
Payer: COMMERCIAL

## 2025-05-19 ENCOUNTER — CLINICAL SUPPORT (OUTPATIENT)
Dept: INTERNAL MEDICINE | Facility: CLINIC | Age: 35
End: 2025-05-19
Payer: COMMERCIAL

## 2025-05-19 VITALS — SYSTOLIC BLOOD PRESSURE: 136 MMHG | DIASTOLIC BLOOD PRESSURE: 94 MMHG

## 2025-05-19 DIAGNOSIS — I10 ESSENTIAL HYPERTENSION: Primary | ICD-10-CM

## 2025-05-19 NOTE — PROGRESS NOTES
The patient was seen here for a B/P nurse visit. Two patient identifiers were used. The patient's b/p was taken in the right arm twice: manually with small b/p 136/84 and automatically with the patient's blood pressure machine from home 122/86 83. And her b/p was taken twice in her left arm: manually with a small b/p 130/82 and automatically with the patient's blood pressure machine from home 136/94 78.

## 2025-06-19 ENCOUNTER — PATIENT MESSAGE (OUTPATIENT)
Dept: OBSTETRICS AND GYNECOLOGY | Facility: CLINIC | Age: 35
End: 2025-06-19
Payer: COMMERCIAL

## 2025-06-19 DIAGNOSIS — Z30.430 ENCOUNTER FOR INSERTION OF MIRENA IUD: Primary | ICD-10-CM

## 2025-06-30 ENCOUNTER — LAB VISIT (OUTPATIENT)
Dept: LAB | Facility: HOSPITAL | Age: 35
End: 2025-06-30
Attending: OBSTETRICS & GYNECOLOGY
Payer: COMMERCIAL

## 2025-06-30 DIAGNOSIS — Z30.430 ENCOUNTER FOR INSERTION OF MIRENA IUD: ICD-10-CM

## 2025-06-30 DIAGNOSIS — Z87.59 HISTORY OF PRE-ECLAMPSIA: ICD-10-CM

## 2025-06-30 DIAGNOSIS — I10 ESSENTIAL HYPERTENSION: ICD-10-CM

## 2025-06-30 LAB
CREAT UR-MCNC: 176 MG/DL (ref 15–325)
PROT UR-MCNC: 15 MG/DL
PROT/CREAT UR: 0.09 MG/G{CREAT}

## 2025-06-30 PROCEDURE — 84156 ASSAY OF PROTEIN URINE: CPT

## 2025-06-30 PROCEDURE — 87591 N.GONORRHOEAE DNA AMP PROB: CPT

## 2025-06-30 NOTE — PROGRESS NOTES
My name is Dr. Jarrett and I am Dee's partners that is covering the inbox while they are out.      This protein test in the urine is normal.    Sincerely,  Alf Jarrett MD

## 2025-07-02 ENCOUNTER — TELEPHONE (OUTPATIENT)
Dept: OBSTETRICS AND GYNECOLOGY | Facility: CLINIC | Age: 35
End: 2025-07-02

## 2025-07-02 ENCOUNTER — PROCEDURE VISIT (OUTPATIENT)
Dept: OBSTETRICS AND GYNECOLOGY | Facility: CLINIC | Age: 35
End: 2025-07-02
Attending: OBSTETRICS & GYNECOLOGY
Payer: COMMERCIAL

## 2025-07-02 VITALS — BODY MASS INDEX: 25.85 KG/M2 | WEIGHT: 145.94 LBS

## 2025-07-02 DIAGNOSIS — Z30.430 ENCOUNTER FOR IUD INSERTION: Primary | ICD-10-CM

## 2025-07-02 PROCEDURE — 58300 INSERT INTRAUTERINE DEVICE: CPT | Mod: S$GLB,,, | Performed by: OBSTETRICS & GYNECOLOGY

## 2025-07-02 NOTE — PROCEDURES
Insertion of IUD    Date/Time: 7/2/2025 11:15 AM    Performed by: Sariah Mejia MD  Authorized by: Sariah Mejia MD    Consent:     Consent obtained:  Prior to procedure the appropriate consent was completed and verified    Consent given by:  Patient    Procedure risks and benefits discussed: yes      Patient questions answered: yes      Patient agrees, verbalizes understanding, and wants to proceed: yes     Device to be inserted was verified by patient: yes    Educational handouts given: no      Instructions and paperwork completed: no    Insertion Procedure:   1 Intra Uterine Device levonorgestreL 52 mg       Pelvic exam performed: yes      Negative GC/chlamydia test: yes      Negative urine pregnancy test: yes      Negative serum pregnancy test: no      Cervix cleaned and prepped: yes      Speculum placed in vagina: yes      Tenaculum applied to cervix: yes      Uterus sounded: yes      Uterus sound depth (cm):  7    IUD inserted with no complications: yes      IUD type:  Mirena    Strings trimmed: yes    Post-procedure:     Patient tolerated procedure well: yes      Patient will follow up after next period: yes

## 2025-07-03 LAB
C TRACH DNA SPEC QL NAA+PROBE: NOT DETECTED
CTGC SOURCE (OHS) ORD-325: NORMAL
N GONORRHOEA DNA UR QL NAA+PROBE: NOT DETECTED

## 2025-08-09 ENCOUNTER — PATIENT MESSAGE (OUTPATIENT)
Dept: OBSTETRICS AND GYNECOLOGY | Facility: CLINIC | Age: 35
End: 2025-08-09
Payer: COMMERCIAL

## 2025-08-15 ENCOUNTER — OFFICE VISIT (OUTPATIENT)
Dept: OBSTETRICS AND GYNECOLOGY | Facility: CLINIC | Age: 35
End: 2025-08-15
Payer: COMMERCIAL

## 2025-08-15 VITALS — BODY MASS INDEX: 23.82 KG/M2 | WEIGHT: 134.5 LBS | DIASTOLIC BLOOD PRESSURE: 90 MMHG | SYSTOLIC BLOOD PRESSURE: 130 MMHG

## 2025-08-15 DIAGNOSIS — Z30.431 ENCOUNTER FOR ROUTINE CHECKING OF INTRAUTERINE CONTRACEPTIVE DEVICE (IUD): Primary | ICD-10-CM

## 2025-08-15 PROCEDURE — 99999 PR PBB SHADOW E&M-EST. PATIENT-LVL III: CPT | Mod: PBBFAC,,, | Performed by: NURSE PRACTITIONER
